# Patient Record
Sex: FEMALE | Race: WHITE | NOT HISPANIC OR LATINO | Employment: OTHER | URBAN - METROPOLITAN AREA
[De-identification: names, ages, dates, MRNs, and addresses within clinical notes are randomized per-mention and may not be internally consistent; named-entity substitution may affect disease eponyms.]

---

## 2018-01-12 ENCOUNTER — APPOINTMENT (EMERGENCY)
Dept: RADIOLOGY | Facility: HOSPITAL | Age: 83
End: 2018-01-12
Payer: MEDICARE

## 2018-01-12 ENCOUNTER — HOSPITAL ENCOUNTER (EMERGENCY)
Facility: HOSPITAL | Age: 83
Discharge: HOME | End: 2018-01-12
Admitting: EMERGENCY MEDICINE
Payer: MEDICARE

## 2018-01-12 VITALS
TEMPERATURE: 98.1 F | BODY MASS INDEX: 44.56 KG/M2 | WEIGHT: 261 LBS | RESPIRATION RATE: 20 BRPM | OXYGEN SATURATION: 95 % | HEART RATE: 65 BPM | DIASTOLIC BLOOD PRESSURE: 60 MMHG | HEIGHT: 64 IN | SYSTOLIC BLOOD PRESSURE: 125 MMHG

## 2018-01-12 DIAGNOSIS — S00.03XA CONTUSION OF SCALP, INITIAL ENCOUNTER: ICD-10-CM
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
## 2018-01-12 PROCEDURE — 70450 CT HEAD/BRAIN W/O DYE: CPT

## 2018-01-12 PROCEDURE — 99284 EMERGENCY DEPT VISIT MOD MDM: CPT

## 2018-01-12 PROCEDURE — 72125 CT NECK SPINE W/O DYE: CPT

## 2018-01-12 NOTE — ED PROVIDER NOTES
History  Chief Complaint   Patient presents with    Fall     pt tripped and fell at home  hit head on dresser  no LOC     Patient is an 81-year-old female, on blood thinners, presenting today after fall that occurred about an hour ago after she tripped falling backwards striking her head on her dresser  Did not lose consciousness  States that she did not get up off the ground as she was told by her orthopedic doctor not to do so as she has bilateral knee replacements  Believes that she would be able to walk  Uses a walker at home  Notes localized pain to her scalp  Denies double vision, weakness, numbness, paresthesias, nausea, vomiting, shortness of breath, wheezing, abdominal pain, LOC  None       Past Medical History:   Diagnosis Date    Cardiac disease     Lyme disease        Past Surgical History:   Procedure Laterality Date    CARDIAC PACEMAKER PLACEMENT      CHOLECYSTECTOMY      HYSTERECTOMY      JOINT REPLACEMENT         No family history on file  I have reviewed and agree with the history as documented  Social History   Substance Use Topics    Smoking status: Never Smoker    Smokeless tobacco: Not on file    Alcohol use No        Review of Systems   Constitutional: Negative  HENT: Negative  Eyes: Negative  Respiratory: Negative  Cardiovascular: Negative  Gastrointestinal: Negative  Genitourinary: Negative  Musculoskeletal: Negative  Skin: Negative  Negative for wound  Neurological: Negative  Negative for tremors, syncope, weakness and headaches  All other systems reviewed and are negative        Physical Exam  ED Triage Vitals [01/12/18 1234]   Temperature Pulse Respirations Blood Pressure SpO2   98 1 °F (36 7 °C) 65 20 125/60 95 %      Temp Source Heart Rate Source Patient Position - Orthostatic VS BP Location FiO2 (%)   Oral Monitor Sitting Right arm --      Pain Score       5           Orthostatic Vital Signs  Vitals:    01/12/18 1234   BP: 125/60   Pulse: 65   Patient Position - Orthostatic VS: Sitting       Physical Exam   Constitutional: She is oriented to person, place, and time  She appears well-developed and well-nourished  HENT:   Head:       Right Ear: External ear normal    Left Ear: External ear normal    Nose: Nose normal    Mouth/Throat: Oropharynx is clear and moist    Eyes: Conjunctivae and EOM are normal  Pupils are equal, round, and reactive to light  Neck: Normal range of motion  Neck supple  Cardiovascular: Normal rate, regular rhythm, normal heart sounds and intact distal pulses  Pulmonary/Chest: Effort normal and breath sounds normal    spo2 is 95% indicating adequate oxygenation  Abdominal: Soft  Bowel sounds are normal    Musculoskeletal:   Patient does not have the joint tenderness, she has no lower back tenderness as well as hip tenderness on rocking  No knee tenderness  Good lower leg strength  Does not have any groin pain  No leg shortening or malrotation   Neurological: She is alert and oriented to person, place, and time  Skin: Skin is warm and dry  Capillary refill takes less than 2 seconds  Nursing note and vitals reviewed  ED Medications  Medications - No data to display    Diagnostic Studies  Results Reviewed     None                 CT cervical spine without contrast   Final Result by Perla Palomo MD (01/12 1400)      No cervical spine fracture or traumatic malalignment  Degenerative changes  Incidental discovery of one or more thyroid nodule(s) measuring more than 1 5 cm and without suspicious features is noted in this patient who is above 28years old; according to guidelines published in the February 2015 white paper on incidental thyroid    nodules in the Journal of the [employer] of Radiology [facility]), further characterization with thyroid ultrasound is recommended                  Workstation performed: [id number]         CT head without contrast   Final Result by Jorge Ulloa Danna Frausto MD (01/12 4120)      No acute intracranial abnormality  Microangiopathic change  Workstation performed: [id number]                    Procedures  Procedures       Phone Contacts  ED Phone Contact    ED Course  ED Course                                MDM  Number of Diagnoses or Management Options  Contusion of scalp, initial encounter:   Fall, initial encounter:   Diagnosis management comments: Discussed with patient results CT scans which were negative for any intracranial abnormality  Patient uses a walker at home and fall seemed to be mechanical in nature  Patient ambulating in emergency department without difficulty  Denies any other joint pain at this time  Patient is informed to return to the emergency department for worsening of symptoms and was given proper education regarding their diagnosis and symptoms  Otherwise the patient is informed to follow up with their primary care doctor for re-evaluation  The patient verbalizes understanding and agrees with above assessment and plan  All questions were answered  Please Note: Fluency Direct voice recognition software may have been used in the creation of this document  Wrong words or sound a like substitutions may have occurred due to the inherent limitations of the voice software  Amount and/or Complexity of Data Reviewed  Tests in the radiology section of CPT®: reviewed and ordered  Review and summarize past medical records: yes  Independent visualization of images, tracings, or specimens: yes      CritCare Time    Disposition  Final diagnoses:   Fall, initial encounter   Contusion of scalp, initial encounter     Time reflects when diagnosis was documented in both MDM as applicable and the Disposition within this note     Time User Action Codes Description Comment    1/12/2018  2:11 PM Renetta Telles [[id number]  [id number]] DAYN, initial encounter     1/12/2018  2:12 PM Renetta Telles [[id number]] Contusion of scalp, initial encounter       ED Disposition     ED Disposition Condition Comment    Discharge  [facility] discharge to home/self care  Condition at discharge: Good        Follow-up Information     Follow up With Specialties Details Why Contact Info Additional [address] Emergency Department Emergency Medicine Go to If symptoms worsen such as falls, confusion, weakness [address] [facility] ED, [place], [place], [address]        There are no discharge medications for this patient  No discharge procedures on file      ED Provider  Electronically Signed by           Namrata Ruggiero PA-C  01/12/18 3882

## 2018-01-12 NOTE — DISCHARGE INSTRUCTIONS

## 2022-01-29 ENCOUNTER — APPOINTMENT (EMERGENCY)
Dept: RADIOLOGY | Facility: HOSPITAL | Age: 87
End: 2022-01-29
Payer: MEDICARE

## 2022-01-29 ENCOUNTER — HOSPITAL ENCOUNTER (EMERGENCY)
Facility: HOSPITAL | Age: 87
Discharge: HOME | End: 2022-01-30
Attending: EMERGENCY MEDICINE
Payer: MEDICARE

## 2022-01-29 DIAGNOSIS — R91.8 LUNG MASS: ICD-10-CM

## 2022-01-29 DIAGNOSIS — J18.9 PNEUMONIA: Primary | ICD-10-CM

## 2022-01-29 DIAGNOSIS — R05.9 COUGH: ICD-10-CM

## 2022-01-29 LAB
ABO GROUP BLD: NORMAL
ALBUMIN SERPL BCP-MCNC: 2.7 G/DL (ref 3.5–5)
ALP SERPL-CCNC: 82 U/L (ref 46–116)
ALT SERPL W P-5'-P-CCNC: 35 U/L (ref 12–78)
ANION GAP SERPL CALCULATED.3IONS-SCNC: 7 MMOL/L (ref 4–13)
AST SERPL W P-5'-P-CCNC: 33 U/L (ref 5–45)
BASOPHILS # BLD AUTO: 0.06 THOUSANDS/ΜL (ref 0–0.1)
BASOPHILS NFR BLD AUTO: 1 % (ref 0–1)
BILIRUB SERPL-MCNC: 0.31 MG/DL (ref 0.2–1)
BLD GP AB SCN SERPL QL: NEGATIVE
BUN SERPL-MCNC: 11 MG/DL (ref 5–25)
CALCIUM ALBUM COR SERPL-MCNC: 11.3 MG/DL (ref 8.3–10.1)
CALCIUM SERPL-MCNC: 10.3 MG/DL (ref 8.3–10.1)
CHLORIDE SERPL-SCNC: 101 MMOL/L (ref 100–108)
CO2 SERPL-SCNC: 30 MMOL/L (ref 21–32)
CREAT SERPL-MCNC: 0.69 MG/DL (ref 0.6–1.3)
EOSINOPHIL # BLD AUTO: 0.32 THOUSAND/ΜL (ref 0–0.61)
EOSINOPHIL NFR BLD AUTO: 3 % (ref 0–6)
ERYTHROCYTE [DISTWIDTH] IN BLOOD BY AUTOMATED COUNT: 13.7 % (ref 11.6–15.1)
GFR SERPL CREATININE-BSD FRML MDRD: 79 ML/MIN/1.73SQ M
GLUCOSE SERPL-MCNC: 106 MG/DL (ref 65–140)
HCT VFR BLD AUTO: 36.6 % (ref 34.8–46.1)
HGB BLD-MCNC: 11.5 G/DL (ref 11.5–15.4)
IMM GRANULOCYTES # BLD AUTO: 0.06 THOUSAND/UL (ref 0–0.2)
IMM GRANULOCYTES NFR BLD AUTO: 1 % (ref 0–2)
LYMPHOCYTES # BLD AUTO: 2.68 THOUSANDS/ΜL (ref 0.6–4.47)
LYMPHOCYTES NFR BLD AUTO: 24 % (ref 14–44)
MCH RBC QN AUTO: 28.9 PG (ref 26.8–34.3)
MCHC RBC AUTO-ENTMCNC: 31.4 G/DL (ref 31.4–37.4)
MCV RBC AUTO: 92 FL (ref 82–98)
MONOCYTES # BLD AUTO: 1.37 THOUSAND/ΜL (ref 0.17–1.22)
MONOCYTES NFR BLD AUTO: 12 % (ref 4–12)
NEUTROPHILS # BLD AUTO: 6.79 THOUSANDS/ΜL (ref 1.85–7.62)
NEUTS SEG NFR BLD AUTO: 59 % (ref 43–75)
NRBC BLD AUTO-RTO: 0 /100 WBCS
PLATELET # BLD AUTO: 364 THOUSANDS/UL (ref 149–390)
PMV BLD AUTO: 9.3 FL (ref 8.9–12.7)
POTASSIUM SERPL-SCNC: 4 MMOL/L (ref 3.5–5.3)
PROT SERPL-MCNC: 6.8 G/DL (ref 6.4–8.2)
RBC # BLD AUTO: 3.98 MILLION/UL (ref 3.81–5.12)
RH BLD: NEGATIVE
SODIUM SERPL-SCNC: 138 MMOL/L (ref 136–145)
SPECIMEN EXPIRATION DATE: NORMAL
WBC # BLD AUTO: 11.28 THOUSAND/UL (ref 4.31–10.16)

## 2022-01-29 PROCEDURE — 85025 COMPLETE CBC W/AUTO DIFF WBC: CPT | Performed by: EMERGENCY MEDICINE

## 2022-01-29 PROCEDURE — 86850 RBC ANTIBODY SCREEN: CPT | Performed by: EMERGENCY MEDICINE

## 2022-01-29 PROCEDURE — 86901 BLOOD TYPING SEROLOGIC RH(D): CPT | Performed by: EMERGENCY MEDICINE

## 2022-01-29 PROCEDURE — 99284 EMERGENCY DEPT VISIT MOD MDM: CPT | Performed by: EMERGENCY MEDICINE

## 2022-01-29 PROCEDURE — 71045 X-RAY EXAM CHEST 1 VIEW: CPT

## 2022-01-29 PROCEDURE — 86900 BLOOD TYPING SEROLOGIC ABO: CPT | Performed by: EMERGENCY MEDICINE

## 2022-01-29 PROCEDURE — 80053 COMPREHEN METABOLIC PANEL: CPT | Performed by: EMERGENCY MEDICINE

## 2022-01-29 PROCEDURE — 36415 COLL VENOUS BLD VENIPUNCTURE: CPT | Performed by: EMERGENCY MEDICINE

## 2022-01-29 PROCEDURE — 99284 EMERGENCY DEPT VISIT MOD MDM: CPT

## 2022-01-29 RX ORDER — FUROSEMIDE 20 MG/1
20 TABLET ORAL DAILY
COMMUNITY

## 2022-01-29 RX ORDER — CARVEDILOL 6.25 MG/1
6.25 TABLET ORAL DAILY
COMMUNITY

## 2022-01-30 ENCOUNTER — APPOINTMENT (EMERGENCY)
Dept: RADIOLOGY | Facility: HOSPITAL | Age: 87
End: 2022-01-30
Payer: MEDICARE

## 2022-01-30 VITALS
SYSTOLIC BLOOD PRESSURE: 144 MMHG | DIASTOLIC BLOOD PRESSURE: 62 MMHG | HEART RATE: 72 BPM | TEMPERATURE: 98.9 F | OXYGEN SATURATION: 93 % | BODY MASS INDEX: 44.29 KG/M2 | RESPIRATION RATE: 20 BRPM | WEIGHT: 258 LBS

## 2022-01-30 LAB
FLUAV RNA RESP QL NAA+PROBE: NEGATIVE
FLUBV RNA RESP QL NAA+PROBE: NEGATIVE
RSV RNA RESP QL NAA+PROBE: NEGATIVE
SARS-COV-2 RNA RESP QL NAA+PROBE: NEGATIVE

## 2022-01-30 PROCEDURE — 0241U HB NFCT DS VIR RESP RNA 4 TRGT: CPT | Performed by: EMERGENCY MEDICINE

## 2022-01-30 PROCEDURE — G1004 CDSM NDSC: HCPCS

## 2022-01-30 PROCEDURE — 71260 CT THORAX DX C+: CPT

## 2022-01-30 RX ORDER — GUAIFENESIN/DEXTROMETHORPHAN 100-10MG/5
10 SYRUP ORAL ONCE
Status: COMPLETED | OUTPATIENT
Start: 2022-01-30 | End: 2022-01-30

## 2022-01-30 RX ORDER — BENZONATATE 100 MG/1
100 CAPSULE ORAL ONCE
Status: COMPLETED | OUTPATIENT
Start: 2022-01-30 | End: 2022-01-30

## 2022-01-30 RX ORDER — AZITHROMYCIN 250 MG/1
500 TABLET, FILM COATED ORAL ONCE
Status: COMPLETED | OUTPATIENT
Start: 2022-01-30 | End: 2022-01-30

## 2022-01-30 RX ORDER — AZITHROMYCIN 250 MG/1
TABLET, FILM COATED ORAL
Qty: 6 TABLET | Refills: 0 | Status: SHIPPED | OUTPATIENT
Start: 2022-01-30 | End: 2022-02-03

## 2022-01-30 RX ORDER — CEFDINIR 300 MG/1
300 CAPSULE ORAL ONCE
Status: COMPLETED | OUTPATIENT
Start: 2022-01-30 | End: 2022-01-30

## 2022-01-30 RX ORDER — CEFDINIR 300 MG/1
300 CAPSULE ORAL EVERY 12 HOURS SCHEDULED
Qty: 14 CAPSULE | Refills: 0 | Status: SHIPPED | OUTPATIENT
Start: 2022-01-30 | End: 2022-02-06

## 2022-01-30 RX ADMIN — BENZONATATE 100 MG: 100 CAPSULE ORAL at 02:49

## 2022-01-30 RX ADMIN — AZITHROMYCIN DIHYDRATE 500 MG: 250 TABLET, FILM COATED ORAL at 02:07

## 2022-01-30 RX ADMIN — GUAIFENESIN AND DEXTROMETHORPHAN 10 ML: 100; 10 SYRUP ORAL at 02:49

## 2022-01-30 RX ADMIN — IOHEXOL 100 ML: 350 INJECTION, SOLUTION INTRAVENOUS at 00:50

## 2022-01-30 RX ADMIN — CEFDINIR 300 MG: 300 CAPSULE ORAL at 02:07

## 2022-01-30 NOTE — ED NOTES
Pt resting comfortably, no distress noted   Pt pending transport to home        Nanette Carcamo, DEBI  01/30/22 4822

## 2022-01-30 NOTE — ED PROVIDER NOTES
History  Chief Complaint   Patient presents with    Cough     Pt reports chronic cough is worse today and reports she started coughing up bright red blood one hour ago  Patient denies CP  Pt takes elequis      HPI  Patient is an 16-year-old female history of CAD, CHF, anticoagulated on Eliquis presenting for evaluation of hemoptysis  Patient states that she has been taking a determine health supplement brought by her visiting nurse for the last few weeks which has somewhat improved her cough symptoms however today had a more prolonged episode of coughing followed by hemoptysis  Patient estimates that she coughed up several tbsp of blood  Patient states that symptoms resolved by the time EMS arrived and states only blood-streaked sputum since that time  Patient denies chest or throat pain, shortness of breath, fevers, chills  Patient states some component of generalized fatigue over the course of the past few weeks  Patient denies prior episodes of hemoptysis  Patient denies smoking history, weight loss, recent travel or sick contacts  Prior to Admission Medications   Prescriptions Last Dose Informant Patient Reported? Taking? Apixaban (ELIQUIS PO) 1/29/2022 at Unknown time  Yes Yes   Sig: Take 5 mg by mouth 2 (two) times a day   bisacodyl (bisacodyl) 5 mg EC tablet 1/28/2022 at Unknown time  Yes Yes   Sig: Take 5 mg by mouth daily as needed for constipation   carvedilol (COREG) 6 25 mg tablet 1/29/2022 at Unknown time  Yes Yes   Sig: Take 6 25 mg by mouth in the morning   furosemide (Lasix) 20 mg tablet 1/29/2022 at Unknown time  Yes Yes   Sig: Take 20 mg by mouth in the morning      Facility-Administered Medications: None       Past Medical History:   Diagnosis Date    Cardiac disease     Lyme disease        Past Surgical History:   Procedure Laterality Date    CARDIAC PACEMAKER PLACEMENT      CHOLECYSTECTOMY      HYSTERECTOMY      JOINT REPLACEMENT         History reviewed   No pertinent family history  I have reviewed and agree with the history as documented  E-Cigarette/Vaping    E-Cigarette Use Never User      E-Cigarette/Vaping Substances    Nicotine No     THC No     CBD No     Flavoring No     Other No     Unknown No      Social History     Tobacco Use    Smoking status: Never Smoker    Smokeless tobacco: Never Used   Vaping Use    Vaping Use: Never used   Substance Use Topics    Alcohol use: No    Drug use: No       Review of Systems   Constitutional: Positive for fatigue  Negative for chills and fever  HENT: Negative for sore throat  Eyes: Negative for visual disturbance  Respiratory: Positive for cough  Negative for chest tightness, shortness of breath, wheezing and stridor  Cardiovascular: Negative for chest pain  Gastrointestinal: Negative for abdominal distention, abdominal pain, constipation, diarrhea, nausea and vomiting  Endocrine: Negative for polydipsia and polyuria  Genitourinary: Negative for dysuria and hematuria  Musculoskeletal: Negative for arthralgias and myalgias  Skin: Negative for color change and rash  Neurological: Negative for dizziness and headaches  Psychiatric/Behavioral: Negative for confusion  All other systems reviewed and are negative  Physical Exam  Physical Exam  Vitals reviewed  Constitutional:       General: She is not in acute distress  Appearance: She is well-developed  She is not diaphoretic  Comments: Well-appearing, nondistressed   HENT:      Head: Normocephalic and atraumatic  Comments: Moist mucous membranes  Normal-appearing oropharynx  Right Ear: External ear normal       Left Ear: External ear normal       Nose: Nose normal       Mouth/Throat:      Pharynx: No oropharyngeal exudate  Eyes:      Pupils: Pupils are equal, round, and reactive to light  Cardiovascular:      Rate and Rhythm: Normal rate and regular rhythm  Heart sounds: Normal heart sounds  No murmur heard    No friction rub  No gallop  Pulmonary:      Effort: Pulmonary effort is normal  No respiratory distress  Breath sounds: Normal breath sounds  No wheezing  Comments: No increased work of breathing  Speaking complete sentences  Satting 95% on room air indicating adequate oxygenation  Chest:      Chest wall: No tenderness  Abdominal:      General: Bowel sounds are normal  There is no distension  Palpations: Abdomen is soft  There is no mass  Tenderness: There is no abdominal tenderness  There is no guarding  Musculoskeletal:         General: No deformity  Normal range of motion  Cervical back: Normal range of motion  Lymphadenopathy:      Cervical: No cervical adenopathy  Skin:     General: Skin is warm and dry  Capillary Refill: Capillary refill takes less than 2 seconds  Neurological:      Mental Status: She is alert and oriented to person, place, and time        Comments: AAO x4         Vital Signs  ED Triage Vitals [01/29/22 2239]   Temperature Pulse Respirations Blood Pressure SpO2   98 9 °F (37 2 °C) 60 20 113/56 95 %      Temp Source Heart Rate Source Patient Position - Orthostatic VS BP Location FiO2 (%)   Oral Monitor -- -- --      Pain Score       No Pain           Vitals:    01/29/22 2239   BP: 113/56   Pulse: 60         Visual Acuity      ED Medications  Medications - No data to display    Diagnostic Studies  Results Reviewed     Procedure Component Value Units Date/Time    Comprehensive metabolic panel [[id number]]  (Abnormal) Collected: 01/29/22 2251    Lab Status: Final result Specimen: Blood from Arm, Right Updated: 01/29/22 2314     Sodium 138 mmol/L      Potassium 4 0 mmol/L      Chloride 101 mmol/L      CO2 30 mmol/L      ANION GAP 7 mmol/L      BUN 11 mg/dL      Creatinine 0 69 mg/dL      Glucose 106 mg/dL      Calcium 10 3 mg/dL      Corrected Calcium 11 3 mg/dL      AST 33 U/L      ALT 35 U/L      Alkaline Phosphatase 82 U/L      Total Protein 6 8 g/dL      Albumin 2 7 g/dL      Total Bilirubin 0 31 mg/dL      eGFR 79 ml/min/1 73sq m     Narrative:      National Kidney Disease Foundation guidelines for Chronic Kidney Disease (CKD):     Stage 1 with normal or high GFR (GFR > 90 mL/min/1 73 square meters)    Stage 2 Mild CKD (GFR = 60-89 mL/min/1 73 square meters)    Stage 3A Moderate CKD (GFR = 45-59 mL/min/1 73 square meters)    Stage 3B Moderate CKD (GFR = 30-44 mL/min/1 73 square meters)    Stage 4 Severe CKD (GFR = 15-29 mL/min/1 73 square meters)    Stage 5 End Stage CKD (GFR <15 mL/min/1 73 square meters)  Note: GFR calculation is accurate only with a steady state creatinine    CBC and differential [[id number]]  (Abnormal) Collected: 01/29/22 2251    Lab Status: Final result Specimen: Blood from Arm, Right Updated: 01/29/22 2257     WBC 11 28 Thousand/uL      RBC 3 98 Million/uL      Hemoglobin 11 5 g/dL      Hematocrit 36 6 %      MCV 92 fL      MCH 28 9 pg      MCHC 31 4 g/dL      RDW 13 7 %      MPV 9 3 fL      Platelets 966 Thousands/uL      nRBC 0 /100 WBCs      Neutrophils Relative 59 %      Immat GRANS % 1 %      Lymphocytes Relative 24 %      Monocytes Relative 12 %      Eosinophils Relative 3 %      Basophils Relative 1 %      Neutrophils Absolute 6 79 Thousands/µL      Immature Grans Absolute 0 06 Thousand/uL      Lymphocytes Absolute 2 68 Thousands/µL      Monocytes Absolute 1 37 Thousand/µL      Eosinophils Absolute 0 32 Thousand/µL      Basophils Absolute 0 06 Thousands/µL                  XR chest 1 view portable    (Results Pending)              Procedures  Procedures         ED Course                                             MDM  Number of Diagnoses or Management Options  Cough  Lung mass  Pneumonia  Diagnosis management comments: 45-year-old female with acute on chronic cough, episode of hemoptysis on Eliquis  Patient with several episodes of minor blood-tinged sputum in emergency department however no bright red blood, no brisk hemoptysis  Patient with nonfocal pulmonary examination  CBC, CMP grossly unremarkable  Patient with right lower lobe consolidation on chest x-ray  Signed out pending CT chest which demonstrated potential mass without evidence of active extrav  Patient ultimately discharged however was provided with radiology report and instructions for short interval follow-up CT, verbal and written return precautions      Disposition  Final diagnoses:   None     ED Disposition     None      Follow-up Information    None         Patient's Medications   Discharge Prescriptions    No medications on file       No discharge procedures on file      PDMP Review     None          ED Provider  Electronically Signed by           Anastasio Baumgarten, MD  01/31/22 0962

## 2022-01-30 NOTE — DISCHARGE INSTRUCTIONS
Round masslike consolidation involving the superior segment of the right lower lobe suspicious for an infectious or inflammatory process, likely bronchopneumonia  However, malignancy cannot be excluded  Therefore, a short-term follow-up CT chest   following treatment in 6-8 weeks is recommended to assess for improvement/resolution and exclude a neoplastic process

## 2022-04-19 ENCOUNTER — HOSPITAL ENCOUNTER (OUTPATIENT)
Dept: RADIOLOGY | Facility: HOSPITAL | Age: 87
Discharge: HOME | End: 2022-04-19
Payer: MEDICARE

## 2022-04-19 DIAGNOSIS — J18.9 PNEUMONIA DUE TO INFECTIOUS ORGANISM, UNSPECIFIED LATERALITY, UNSPECIFIED PART OF LUNG: ICD-10-CM

## 2022-04-19 PROCEDURE — 71250 CT THORAX DX C-: CPT

## 2022-04-19 PROCEDURE — G1004 CDSM NDSC: HCPCS

## 2023-03-08 ENCOUNTER — OFFICE VISIT (OUTPATIENT)
Dept: URGENT CARE | Facility: CLINIC | Age: 88
End: 2023-03-08

## 2023-03-08 VITALS
TEMPERATURE: 98 F | WEIGHT: 233 LBS | SYSTOLIC BLOOD PRESSURE: 144 MMHG | OXYGEN SATURATION: 95 % | DIASTOLIC BLOOD PRESSURE: 63 MMHG | RESPIRATION RATE: 20 BRPM | BODY MASS INDEX: 39.99 KG/M2 | HEART RATE: 68 BPM

## 2023-03-08 DIAGNOSIS — R39.9 UTI SYMPTOMS: Primary | ICD-10-CM

## 2023-03-08 DIAGNOSIS — R19.7 DIARRHEA, UNSPECIFIED TYPE: ICD-10-CM

## 2023-03-08 DIAGNOSIS — N89.8 VAGINAL ITCHING: ICD-10-CM

## 2023-03-08 LAB
SL AMB  POCT GLUCOSE, UA: NEGATIVE
SL AMB LEUKOCYTE ESTERASE,UA: ABNORMAL
SL AMB POCT BILIRUBIN,UA: NEGATIVE
SL AMB POCT BLOOD,UA: ABNORMAL
SL AMB POCT CLARITY,UA: ABNORMAL
SL AMB POCT COLOR,UA: YELLOW
SL AMB POCT KETONES,UA: NEGATIVE
SL AMB POCT NITRITE,UA: NEGATIVE
SL AMB POCT PH,UA: 6.5
SL AMB POCT SPECIFIC GRAVITY,UA: 1
SL AMB POCT URINE PROTEIN: NEGATIVE
SL AMB POCT UROBILINOGEN: 0.2

## 2023-03-08 RX ORDER — MONTELUKAST SODIUM 10 MG/1
TABLET ORAL
COMMUNITY
Start: 2023-03-07

## 2023-03-08 RX ORDER — LOPERAMIDE HYDROCHLORIDE 2 MG/1
2 CAPSULE ORAL 4 TIMES DAILY PRN
Qty: 15 CAPSULE | Refills: 0 | Status: SHIPPED | OUTPATIENT
Start: 2023-03-08 | End: 2023-03-11

## 2023-03-08 RX ORDER — CLOTRIMAZOLE 1 %
CREAM (GRAM) TOPICAL 2 TIMES DAILY
Qty: 30 G | Refills: 0 | Status: SHIPPED | OUTPATIENT
Start: 2023-03-08

## 2023-03-08 RX ORDER — ALBUTEROL SULFATE 90 UG/1
AEROSOL, METERED RESPIRATORY (INHALATION)
COMMUNITY
Start: 2023-02-08

## 2023-03-08 NOTE — PROGRESS NOTES
[facility]        NAME: Rajeev Campbell is a 81 y o  female  : 1935    MRN: [id number]  DATE: 2023  TIME: 2:19 PM    Assessment and Plan   UTI symptoms [R39 9]  1  UTI symptoms  POCT urine dip    Urine culture      2  Vaginal itching  clotrimazole (LOTRIMIN) 1 % cream      3  Diarrhea, unspecified type  loperamide (IMODIUM) 2 mg capsule        Positive urine dip  We will send a urine culture to confirm the presence of infection and hold off on antibiotics for now so as not to worsen her current diarrhea  Loperamide prescribed to reduce her diarrhea  Advised on hydrating with plenty of water and to consider electrolyte solution to replenish lost electrolytes from her diarrhea  Topical antifungal prescribed for presumed vaginal candidiasis  Patient Instructions     Follow up with PCP in 3-5 days  Proceed to  ER if symptoms worsen  Chief Complaint     Chief Complaint   Patient presents with   • Diarrhea     4- 5 days,    • Vaginal Itching   • Vomiting     Coughing then vomits per pt  • Nausea     Per patient    • Shoulder Pain     Right ,after pulling liter box x 1 week         History of Present Illness     81year-old female presents today with 4 days of diarrhea concerned that he may have developed into a UTI  Has been experiencing abdominal discomfort, dizziness and vaginal itching for the same duration  Of note, has also been experiencing hematochezia and suspects that one of her hemorrhoids ruptured  Last colonoscopy was fairly unremarkable where they removed a few benign polyps  Is here with her caretaker who discovered today that she has been experiencing diarrhea  Denies any fevers, chills, chest pain, dyspnea or headaches  Review of Systems   Review of Systems   Constitutional: Negative for chills and fever  Respiratory: Negative for shortness of breath  Gastrointestinal: Positive for diarrhea     Genitourinary: Negative for dysuria, frequency, hematuria and menstrual problem  Psychiatric/Behavioral: The patient is nervous/anxious  Current Medications       Current Outpatient Medications:   •  Apixaban (ELIQUIS PO), Take 5 mg by mouth 2 (two) times a day, Disp: , Rfl:   •  carvedilol (COREG) 6 25 mg tablet, Take 6 25 mg by mouth in the morning, Disp: , Rfl:   •  clotrimazole (LOTRIMIN) 1 % cream, Apply topically 2 (two) times a day, Disp: 30 g, Rfl: 0  •  furosemide (LASIX) 20 mg tablet, Take 20 mg by mouth in the morning, Disp: , Rfl:   •  loperamide (IMODIUM) 2 mg capsule, Take 1 capsule (2 mg total) by mouth 4 (four) times a day as needed for diarrhea for up to 3 days, Disp: 15 capsule, Rfl: 0  •  albuterol (PROVENTIL HFA,VENTOLIN HFA) 90 mcg/act inhaler, , Disp: , Rfl:   •  bisacodyl (bisacodyl) 5 mg EC tablet, Take 5 mg by mouth daily as needed for constipation, Disp: , Rfl:   •  montelukast (SINGULAIR) 10 mg tablet, , Disp: , Rfl:     Current Allergies     Allergies as of 03/08/2023 - Reviewed 03/08/2023   Allergen Reaction Noted   • Ciprofloxacin Hives 09/29/2016   • Doxycycline Hives 02/22/2019   • Peanut oil - food allergy Hives 01/29/2022   • Penicillins Hives 09/29/2016   • Erythromycin base Rash 07/21/2019            The following portions of the patient's history were reviewed and updated as appropriate: allergies, current medications, past family history, past medical history, past social history, past surgical history and problem list      Past Medical History:   Diagnosis Date   • Cardiac disease    • Lyme disease        Past Surgical History:   Procedure Laterality Date   • CARDIAC PACEMAKER PLACEMENT     • CHOLECYSTECTOMY     • HYSTERECTOMY     • JOINT REPLACEMENT         History reviewed  No pertinent family history  Medications have been verified  Objective   /63   Pulse 68   Temp 98 °F (36 7 °C)   Resp 20   Wt 106 kg (233 lb)   SpO2 95%   BMI 39 99 kg/m²   No LMP recorded  Patient has had a hysterectomy  Physical Exam     Physical Exam  Vitals and nursing note reviewed  Constitutional:       General: She is in acute distress  Appearance: Normal appearance  She is not ill-appearing, toxic-appearing or diaphoretic  HENT:      Head: Normocephalic and atraumatic  Eyes:      General:         Right eye: No discharge  Left eye: No discharge  Conjunctiva/sclera: Conjunctivae normal    Cardiovascular:      Rate and Rhythm: Normal rate  Pulmonary:      Effort: Pulmonary effort is normal       Breath sounds: Normal breath sounds  Abdominal:      General: Abdomen is flat  Musculoskeletal:         General: No tenderness  Skin:     General: Skin is warm  Findings: No erythema  Neurological:      General: No focal deficit present  Mental Status: She is alert and oriented to person, place, and time  Psychiatric:         Mood and Affect: Mood normal          Behavior: Behavior normal          Thought Content:  Thought content normal          Judgment: Judgment normal

## 2023-03-09 LAB — BACTERIA UR CULT: NORMAL

## 2023-10-26 ENCOUNTER — HOSPITAL ENCOUNTER (INPATIENT)
Facility: HOSPITAL | Age: 88
LOS: 3 days | Discharge: NURSING, REHAB OR LONG-TERM CARE | DRG: 179 | End: 2023-10-30
Attending: EMERGENCY MEDICINE | Admitting: INTERNAL MEDICINE
Payer: MEDICARE

## 2023-10-26 ENCOUNTER — APPOINTMENT (EMERGENCY)
Dept: RADIOLOGY | Facility: HOSPITAL | Age: 88
DRG: 179 | End: 2023-10-26
Payer: MEDICARE

## 2023-10-26 DIAGNOSIS — R53.1 WEAKNESS: ICD-10-CM

## 2023-10-26 DIAGNOSIS — U07.1 COVID: Primary | ICD-10-CM

## 2023-10-26 PROBLEM — D75.839 THROMBOCYTOSIS: Status: ACTIVE | Noted: 2023-10-26

## 2023-10-26 PROBLEM — F99 PSYCHIATRIC DISORDER: Status: ACTIVE | Noted: 2023-10-26

## 2023-10-26 PROBLEM — D72.829 LEUKOCYTOSIS: Status: ACTIVE | Noted: 2023-10-26

## 2023-10-26 PROBLEM — K21.9 GERD (GASTROESOPHAGEAL REFLUX DISEASE): Status: ACTIVE | Noted: 2023-10-26

## 2023-10-26 PROBLEM — Z87.09 HISTORY OF ASTHMA: Status: ACTIVE | Noted: 2023-10-26

## 2023-10-26 PROBLEM — I48.91 ATRIAL FIBRILLATION (HCC): Status: ACTIVE | Noted: 2023-10-26

## 2023-10-26 LAB
2HR DELTA HS TROPONIN: 6 NG/L
ALBUMIN SERPL BCP-MCNC: 3.2 G/DL (ref 3.5–5)
ALP SERPL-CCNC: 63 U/L (ref 34–104)
ALT SERPL W P-5'-P-CCNC: 7 U/L (ref 7–52)
ANION GAP SERPL CALCULATED.3IONS-SCNC: 7 MMOL/L
APTT PPP: 39 SECONDS (ref 23–37)
AST SERPL W P-5'-P-CCNC: 10 U/L (ref 13–39)
ATRIAL RATE: 60 BPM
BASOPHILS # BLD AUTO: 0.05 THOUSANDS/ÂΜL (ref 0–0.1)
BASOPHILS NFR BLD AUTO: 0 % (ref 0–1)
BILIRUB SERPL-MCNC: 0.45 MG/DL (ref 0.2–1)
BNP SERPL-MCNC: 89 PG/ML (ref 0–100)
BUN SERPL-MCNC: 10 MG/DL (ref 5–25)
CALCIUM ALBUM COR SERPL-MCNC: 10.8 MG/DL (ref 8.3–10.1)
CALCIUM SERPL-MCNC: 10.2 MG/DL (ref 8.4–10.2)
CARDIAC TROPONIN I PNL SERPL HS: 21 NG/L
CARDIAC TROPONIN I PNL SERPL HS: 27 NG/L
CHLORIDE SERPL-SCNC: 102 MMOL/L (ref 96–108)
CO2 SERPL-SCNC: 27 MMOL/L (ref 21–32)
CREAT SERPL-MCNC: 0.62 MG/DL (ref 0.6–1.3)
EOSINOPHIL # BLD AUTO: 0.08 THOUSAND/ÂΜL (ref 0–0.61)
EOSINOPHIL NFR BLD AUTO: 1 % (ref 0–6)
ERYTHROCYTE [DISTWIDTH] IN BLOOD BY AUTOMATED COUNT: 14.4 % (ref 11.6–15.1)
FLUAV RNA RESP QL NAA+PROBE: NEGATIVE
FLUBV RNA RESP QL NAA+PROBE: NEGATIVE
GFR SERPL CREATININE-BSD FRML MDRD: 81 ML/MIN/1.73SQ M
GLUCOSE SERPL-MCNC: 110 MG/DL (ref 65–140)
HCT VFR BLD AUTO: 34.9 % (ref 34.8–46.1)
HGB BLD-MCNC: 11 G/DL (ref 11.5–15.4)
IMM GRANULOCYTES # BLD AUTO: 0.06 THOUSAND/UL (ref 0–0.2)
IMM GRANULOCYTES NFR BLD AUTO: 0 % (ref 0–2)
INR PPP: 1.62 (ref 0.84–1.19)
LIPASE SERPL-CCNC: 16 U/L (ref 11–82)
LYMPHOCYTES # BLD AUTO: 1.25 THOUSANDS/ÂΜL (ref 0.6–4.47)
LYMPHOCYTES NFR BLD AUTO: 9 % (ref 14–44)
MCH RBC QN AUTO: 27.6 PG (ref 26.8–34.3)
MCHC RBC AUTO-ENTMCNC: 31.5 G/DL (ref 31.4–37.4)
MCV RBC AUTO: 88 FL (ref 82–98)
MONOCYTES # BLD AUTO: 1.05 THOUSAND/ÂΜL (ref 0.17–1.22)
MONOCYTES NFR BLD AUTO: 8 % (ref 4–12)
NEUTROPHILS # BLD AUTO: 11.6 THOUSANDS/ÂΜL (ref 1.85–7.62)
NEUTS SEG NFR BLD AUTO: 82 % (ref 43–75)
NRBC BLD AUTO-RTO: 0 /100 WBCS
PLATELET # BLD AUTO: 453 THOUSANDS/UL (ref 149–390)
PMV BLD AUTO: 9.5 FL (ref 8.9–12.7)
POTASSIUM SERPL-SCNC: 3.6 MMOL/L (ref 3.5–5.3)
PR INTERVAL: 322 MS
PROCALCITONIN SERPL-MCNC: 0.05 NG/ML
PROT SERPL-MCNC: 6.4 G/DL (ref 6.4–8.4)
PROTHROMBIN TIME: 19.5 SECONDS (ref 11.6–14.5)
QRS AXIS: -13 DEGREES
QRSD INTERVAL: 166 MS
QT INTERVAL: 438 MS
QTC INTERVAL: 438 MS
RBC # BLD AUTO: 3.98 MILLION/UL (ref 3.81–5.12)
RSV RNA RESP QL NAA+PROBE: NEGATIVE
SARS-COV-2 RNA RESP QL NAA+PROBE: POSITIVE
SODIUM SERPL-SCNC: 136 MMOL/L (ref 135–147)
T WAVE AXIS: 228 DEGREES
VENTRICULAR RATE: 60 BPM
WBC # BLD AUTO: 14.09 THOUSAND/UL (ref 4.31–10.16)

## 2023-10-26 PROCEDURE — 71045 X-RAY EXAM CHEST 1 VIEW: CPT

## 2023-10-26 PROCEDURE — 85610 PROTHROMBIN TIME: CPT | Performed by: EMERGENCY MEDICINE

## 2023-10-26 PROCEDURE — 99223 1ST HOSP IP/OBS HIGH 75: CPT | Performed by: INTERNAL MEDICINE

## 2023-10-26 PROCEDURE — G1004 CDSM NDSC: HCPCS

## 2023-10-26 PROCEDURE — 99284 EMERGENCY DEPT VISIT MOD MDM: CPT

## 2023-10-26 PROCEDURE — 83880 ASSAY OF NATRIURETIC PEPTIDE: CPT | Performed by: EMERGENCY MEDICINE

## 2023-10-26 PROCEDURE — 93005 ELECTROCARDIOGRAM TRACING: CPT

## 2023-10-26 PROCEDURE — 83690 ASSAY OF LIPASE: CPT | Performed by: EMERGENCY MEDICINE

## 2023-10-26 PROCEDURE — 99285 EMERGENCY DEPT VISIT HI MDM: CPT | Performed by: EMERGENCY MEDICINE

## 2023-10-26 PROCEDURE — 80053 COMPREHEN METABOLIC PANEL: CPT | Performed by: EMERGENCY MEDICINE

## 2023-10-26 PROCEDURE — 97163 PT EVAL HIGH COMPLEX 45 MIN: CPT

## 2023-10-26 PROCEDURE — 0241U HB NFCT DS VIR RESP RNA 4 TRGT: CPT | Performed by: EMERGENCY MEDICINE

## 2023-10-26 PROCEDURE — 93010 ELECTROCARDIOGRAM REPORT: CPT | Performed by: INTERNAL MEDICINE

## 2023-10-26 PROCEDURE — 36415 COLL VENOUS BLD VENIPUNCTURE: CPT | Performed by: EMERGENCY MEDICINE

## 2023-10-26 PROCEDURE — 85730 THROMBOPLASTIN TIME PARTIAL: CPT | Performed by: EMERGENCY MEDICINE

## 2023-10-26 PROCEDURE — 84484 ASSAY OF TROPONIN QUANT: CPT | Performed by: EMERGENCY MEDICINE

## 2023-10-26 PROCEDURE — 70450 CT HEAD/BRAIN W/O DYE: CPT

## 2023-10-26 PROCEDURE — 85025 COMPLETE CBC W/AUTO DIFF WBC: CPT | Performed by: EMERGENCY MEDICINE

## 2023-10-26 PROCEDURE — 97167 OT EVAL HIGH COMPLEX 60 MIN: CPT

## 2023-10-26 PROCEDURE — 96360 HYDRATION IV INFUSION INIT: CPT

## 2023-10-26 PROCEDURE — 84145 PROCALCITONIN (PCT): CPT | Performed by: INTERNAL MEDICINE

## 2023-10-26 RX ORDER — MONTELUKAST SODIUM 10 MG/1
10 TABLET ORAL
Status: DISCONTINUED | OUTPATIENT
Start: 2023-10-26 | End: 2023-10-30 | Stop reason: HOSPADM

## 2023-10-26 RX ORDER — SERTRALINE HYDROCHLORIDE 25 MG/1
25 TABLET, FILM COATED ORAL DAILY
Status: DISCONTINUED | OUTPATIENT
Start: 2023-10-27 | End: 2023-10-30 | Stop reason: HOSPADM

## 2023-10-26 RX ORDER — RISPERIDONE 0.25 MG/1
0.25 TABLET ORAL DAILY
Status: DISCONTINUED | OUTPATIENT
Start: 2023-10-27 | End: 2023-10-30 | Stop reason: HOSPADM

## 2023-10-26 RX ORDER — GUAIFENESIN/DEXTROMETHORPHAN 100-10MG/5
10 SYRUP ORAL EVERY 4 HOURS PRN
Status: DISCONTINUED | OUTPATIENT
Start: 2023-10-26 | End: 2023-10-30 | Stop reason: HOSPADM

## 2023-10-26 RX ORDER — CARVEDILOL 6.25 MG/1
6.25 TABLET ORAL DAILY
Status: DISCONTINUED | OUTPATIENT
Start: 2023-10-26 | End: 2023-10-30 | Stop reason: HOSPADM

## 2023-10-26 RX ORDER — ALBUTEROL SULFATE 90 UG/1
2 AEROSOL, METERED RESPIRATORY (INHALATION) EVERY 4 HOURS PRN
Status: DISCONTINUED | OUTPATIENT
Start: 2023-10-26 | End: 2023-10-30 | Stop reason: HOSPADM

## 2023-10-26 RX ORDER — FAMOTIDINE 20 MG/1
40 TABLET, FILM COATED ORAL DAILY
Status: DISCONTINUED | OUTPATIENT
Start: 2023-10-27 | End: 2023-10-30 | Stop reason: HOSPADM

## 2023-10-26 RX ORDER — ACETAMINOPHEN 325 MG/1
650 TABLET ORAL EVERY 6 HOURS PRN
COMMUNITY

## 2023-10-26 RX ORDER — DOCUSATE SODIUM 100 MG/1
100 CAPSULE, LIQUID FILLED ORAL 2 TIMES DAILY
COMMUNITY

## 2023-10-26 RX ORDER — DEXAMETHASONE SODIUM PHOSPHATE 4 MG/ML
6 INJECTION, SOLUTION INTRA-ARTICULAR; INTRALESIONAL; INTRAMUSCULAR; INTRAVENOUS; SOFT TISSUE EVERY 24 HOURS
Status: DISCONTINUED | OUTPATIENT
Start: 2023-10-26 | End: 2023-10-30 | Stop reason: HOSPADM

## 2023-10-26 RX ORDER — DOCUSATE SODIUM 100 MG/1
100 CAPSULE, LIQUID FILLED ORAL 2 TIMES DAILY
Status: DISCONTINUED | OUTPATIENT
Start: 2023-10-26 | End: 2023-10-30 | Stop reason: HOSPADM

## 2023-10-26 RX ORDER — FAMOTIDINE 40 MG/1
40 TABLET, FILM COATED ORAL DAILY
COMMUNITY

## 2023-10-26 RX ORDER — ONDANSETRON 2 MG/ML
4 INJECTION INTRAMUSCULAR; INTRAVENOUS EVERY 4 HOURS PRN
Status: DISCONTINUED | OUTPATIENT
Start: 2023-10-26 | End: 2023-10-30 | Stop reason: HOSPADM

## 2023-10-26 RX ORDER — BISACODYL 5 MG/1
5 TABLET, DELAYED RELEASE ORAL DAILY PRN
Status: DISCONTINUED | OUTPATIENT
Start: 2023-10-26 | End: 2023-10-30 | Stop reason: HOSPADM

## 2023-10-26 RX ORDER — SERTRALINE HYDROCHLORIDE 25 MG/1
25 TABLET, FILM COATED ORAL DAILY
COMMUNITY

## 2023-10-26 RX ORDER — RISPERIDONE 0.25 MG/1
0.25 TABLET ORAL DAILY
COMMUNITY

## 2023-10-26 RX ORDER — FUROSEMIDE 20 MG/1
20 TABLET ORAL
Status: DISCONTINUED | OUTPATIENT
Start: 2023-10-27 | End: 2023-10-30 | Stop reason: HOSPADM

## 2023-10-26 RX ORDER — ACETAMINOPHEN 325 MG/1
650 TABLET ORAL EVERY 6 HOURS PRN
Status: DISCONTINUED | OUTPATIENT
Start: 2023-10-26 | End: 2023-10-30 | Stop reason: HOSPADM

## 2023-10-26 RX ADMIN — DEXAMETHASONE SODIUM PHOSPHATE 6 MG: 4 INJECTION INTRA-ARTICULAR; INTRALESIONAL; INTRAMUSCULAR; INTRAVENOUS; SOFT TISSUE at 15:58

## 2023-10-26 RX ADMIN — SODIUM CHLORIDE 500 ML: 0.9 INJECTION, SOLUTION INTRAVENOUS at 12:26

## 2023-10-26 RX ADMIN — DOCUSATE SODIUM 100 MG: 100 CAPSULE, LIQUID FILLED ORAL at 18:19

## 2023-10-26 RX ADMIN — APIXABAN 5 MG: 5 TABLET, FILM COATED ORAL at 22:48

## 2023-10-26 RX ADMIN — CARVEDILOL 6.25 MG: 6.25 TABLET, FILM COATED ORAL at 18:19

## 2023-10-26 RX ADMIN — MONTELUKAST 10 MG: 10 TABLET, FILM COATED ORAL at 22:48

## 2023-10-26 NOTE — PLAN OF CARE
Problem: PHYSICAL THERAPY ADULT  Goal: Performs mobility at highest level of function for planned discharge setting. See evaluation for individualized goals. Description: Treatment/Interventions: Therapeutic exercise, LE strengthening/ROM, Functional transfer training, ADL retraining, Endurance training, Bed mobility, Gait training, Equipment eval/education, Patient/family training, Spoke to MD          See flowsheet documentation for full assessment, interventions and recommendations. Note: Prognosis: Good  Problem List: Decreased strength, Decreased range of motion, Decreased endurance, Impaired balance, Decreased mobility, Pain, Obesity  Assessment: Patient is an 81y.o. year old female seen for Physical Therapy evaluation. Patient admitted with COVID+, weakness. Comorbidities affecting patient's physical performance include: gout, falls. Personal factors affecting patient at time of initial evaluation include: ambulating with assistive device, inability to ambulate household distances, limited home support, positive fall history, inability to perform ADLS, and inability to live alone. Prior to admission, patient was independent with functional mobility with walker or WC and requiring assist for ADLS. Please find objective findings from Physical Therapy assessment regarding body systems outlined above with impairments and limitations including weakness, decreased ROM, impaired balance, decreased endurance, gait deviations, pain, decreased activity tolerance, decreased functional mobility tolerance, and fall risk. The Barthel Index was used as a functional outcome tool presenting with a score of Barthel Index Score: 55 today indicating marked limitations of functional mobility and ADLS.   Patient's clinical presentation is currently unstable/unpredictable as seen in patient's presentation of changing level of pain, increased fall risk, new onset of impairment of functional mobility, decreased endurance, and new onset of weakness. Pt would benefit from continued Physical Therapy treatment to address deficits as defined above and maximize level of functional mobility. As demonstrated by objective findings, the assigned level of complexity for this evaluation is high. The patient's -[facility] Basic Mobility Inpatient Short Form Raw Score is 14. A Raw score of less than or equal to 16 suggests the patient may benefit from discharge to post-acute rehabilitation services. PT Discharge Recommendation: Post acute rehabilitation services    See flowsheet documentation for full assessment.

## 2023-10-26 NOTE — H&P
[address] [facility]  H&P  Name: Chaparrita Rodriguez 81 y.o. female I MRN: [id number]  Unit/Bed#: [address] [place] [address] Date of Admission: 10/26/2023   Date of Service: 10/26/2023 I Hospital Day: 0      Assessment & Plan:    * Generalized weakness  Assessment & Plan  Likely multifactorial secondary to COVID infection coupled with progressive deconditioning and chronic history of gout  PT/OT input pending  Reported some occasional dizziness at various periods throughout the day of admission but currently denies -> CT of head negative for acute intracranial etiology    COVID-19 virus infection  Assessment & Plan  Oxygenating at baseline room air at rest, however, with attempts at ambulation in the ED, patient became hypoxic with transient resolution once back at rest  Decadron regimen initiated due to transient need for oxygen with exertion  Maintain airborne/contact cautions  Monitor oxygenation  Denies fever/chills or worsening shortness of breath at rest  PRN Robitussin on board  Check procalcitonin -> observe off antibiotics for now    Leukocytosis  Assessment & Plan  Likely reactive due to acute medical issue(s)  Monitor WBC count - anticipate fluctuation with corticosteroids    Thrombocytosis  Assessment & Plan  Likely reactive due to acute medical issue(s)  Monitor platelet count    Atrial fibrillation (HCC)  Assessment & Plan  Rate controlled on Coreg  Chronically on Eliquis for anticoagulation  Status post pacemaker    GERD (gastroesophageal reflux disease)  Assessment & Plan  Continue Pepcid    Psychiatric disorder  Assessment & Plan  Continue Zoloft/Risperdal    History of asthma  Assessment & Plan  Stable/asymptomatic  Continue Singulair and PRN Albuterol      DVT Prophylaxis:  Eliquis    Code Status:  Full code    Discussion with:  Patient at bedside    Anticipated Length of Stay:  Patient will be admitted on an Observation basis with an anticipated length of stay of less than 2 midnights. Justification for Hospital Stay: Generalized weakness with transient hypoxia with exertion in the setting of COVID infection requiring respiratory and hemodynamic monitoring, and PT/OT evaluation. Total Time for Visit, including Counseling / Coordination of Care: 75 minutes. More than 50% of total time spent on counseling and coordination of care, on one or more of the following: performing physical exam; counseling and coordination of care; obtaining or reviewing history; documenting in the medical record; reviewing/ordering tests, medications or procedures; communicating with other healthcare professionals and discussing with patient's family/caregivers. Chief Complaint: Weakness      History of Present Illness:    Alejo Smith is a 81 y.o. female who presents with complaints of generalized weakness and occasional dizziness since earlier this morning after having breakfast.  She also notes a mild intermittent cough. Denies fever/chills. In the ED, she was incidentally found to be COVID-positive. She was oxygenating at rest on room air at baseline in the ED, however, when attempting to ambulate, it was noted that her oxygen saturations dipped into the 80s, with transient normalization, once back at rest.  At the time of my encounter, she is in relatively pleasant spirits and is surprised she was COVID-positive at this time. She denies any new complaints and is willing to work with physical and occupational therapy. Review of Systems:    Review of Systems - A thorough 12 point review systems was conducted. Pertinent positives and negatives are mentioned in the history of present illness.       Past Medical and Surgical History:     Past Medical History:   Diagnosis Date    Cardiac disease     Lyme disease        Past Surgical History:   Procedure Laterality Date    CARDIAC PACEMAKER PLACEMENT      CHOLECYSTECTOMY      HYSTERECTOMY      JOINT REPLACEMENT           Medications & Allergies:    Prior to Admission medications    Medication Sig Start Date End Date Taking? Authorizing Provider   acetaminophen (TYLENOL) 325 mg tablet Take 650 mg by mouth every 6 (six) hours as needed for mild pain   Yes Historical Provider, MD   albuterol (PROVENTIL HFA,VENTOLIN HFA) 90 mcg/act inhaler  2/8/23  Yes Historical Provider, MD   Apixaban (ELIQUIS PO) Take 5 mg by mouth 2 (two) times a day   Yes Historical Provider, MD   carvedilol (COREG) 6.25 mg tablet Take 6.25 mg by mouth in the morning   Yes Historical Provider, MD   clotrimazole (LOTRIMIN) 1 % cream Apply topically 2 (two) times a day 3/8/23  Yes Rachelle Guerrero MD   docusate sodium (COLACE) 100 mg capsule Take 100 mg by mouth 2 (two) times a day   Yes Historical Provider, MD   famotidine (PEPCID) 40 MG tablet Take 40 mg by mouth daily   Yes Historical Provider, MD   furosemide (LASIX) 20 mg tablet Take 20 mg by mouth in the morning   Yes Historical Provider, MD   montelukast (SINGULAIR) 10 mg tablet  3/7/23  Yes Historical Provider, MD   risperiDONE (RisperDAL) 0.25 mg tablet Take 0.25 mg by mouth daily   Yes Historical Provider, MD   sertraline (ZOLOFT) 25 mg tablet Take 25 mg by mouth daily   Yes Historical Provider, MD   bisacodyl (bisacodyl) 5 mg EC tablet Take 5 mg by mouth daily as needed for constipation    Historical Provider, MD         Allergies:    Allergies   Allergen Reactions    Chocolate - Food Allergy Other (See Comments)     constipation    Ciprofloxacin Hives    Doxycycline Hives    Peanut Oil - Food Allergy Hives    Penicillins Hives    Soybean Oil - Food Allergy Nausea Only    Erythromycin Base Rash         Social History:    Substance Use History:   Social History     Substance and Sexual Activity   Alcohol Use Never     Social History     Tobacco Use   Smoking Status Never   Smokeless Tobacco Never     Social History     Substance and Sexual Activity   Drug Use No         Family History:    Cannot recall pertinent family history      Physical Exam:     Vitals:   Blood Pressure: 117/77 (10/26/23 1645)  Pulse: 61 (10/26/23 1645)  Temperature: 98 °F (36.7 °C) (10/26/23 1645)  Temp Source: Oral (10/26/23 1645)  Respirations: 18 (10/26/23 1645)  SpO2: 93 % (10/26/23 1645)      GENERAL Weak/fatigued   HEAD   Normocephalic - atraumatic   EYES   PERRL - EOMI    MOUTH   Mucosa moist   NECK   Supple - full range of motion   CARDIAC Rate controlled - S1/S2 positive   PULMONARY Fairly clear to auscultation - nonlabored respirations at rest   ABDOMEN   Soft - nontender/nondistended - active bowel sounds   MUSCULOSKELETAL   Motor strength/range of motion deconditioned   NEUROLOGIC   Alert/oriented at baseline   SKIN   Chronic wrinkles/blemishes    PSYCHIATRIC   Mood/affect pleasant         Additional Data:     Labs & Recent Cultures:    Results from last 7 days   Lab Units 10/26/23  1220   WBC Thousand/uL 14.09*   HEMOGLOBIN g/dL 11.0*   HEMATOCRIT % 34.9   PLATELETS Thousands/uL 453*   NEUTROS PCT % 82*   LYMPHS PCT % 9*   MONOS PCT % 8   EOS PCT % 1     Results from last 7 days   Lab Units 10/26/23  1220   SODIUM mmol/L 136   POTASSIUM mmol/L 3.6   CHLORIDE mmol/L 102   CO2 mmol/L 27   BUN mg/dL 10   CREATININE mg/dL 0.62   ANION GAP mmol/L 7   CALCIUM mg/dL 10.2   ALBUMIN g/dL 3.2*   TOTAL BILIRUBIN mg/dL 0.45   ALK PHOS U/L 63   ALT U/L 7   AST U/L 10*   GLUCOSE RANDOM mg/dL 110     Results from last 7 days   Lab Units 10/26/23  1220   INR  1.62*                             Lines/Drains:  Invasive Devices       Peripheral Intravenous Line  Duration             Peripheral IV 10/26/23 Left Antecubital <1 day                      Imaging:     CT head without contrast    Result Date: 10/26/2023  Narrative: CT BRAIN - WITHOUT CONTRAST INDICATION:   Dizziness, non-specific weakness and dizziness. COMPARISON: 1/12/2018 TECHNIQUE:  CT examination of the brain was performed. Multiplanar 2D reformatted images were created from the source data. Radiation dose length product (DLP) for this visit:  952.43 mGy-cm . This examination, like all CT scans performed in the [facility], was performed utilizing techniques to minimize radiation dose exposure, including the use of iterative  reconstruction and automated exposure control. IMAGE QUALITY:  Diagnostic. FINDINGS: PARENCHYMA:  No intracranial mass, mass effect or midline shift. No CT signs of acute infarction. No acute parenchymal hemorrhage. Age-related involutional and mild chronic microvascular ischemic change. VENTRICLES AND EXTRA-AXIAL SPACES:  Normal for the patient's age. VISUALIZED ORBITS: Normal visualized orbits. PARANASAL SINUSES: Normal visualized paranasal sinuses. CALVARIUM AND EXTRACRANIAL SOFT TISSUES:  Normal.     Impression: No mass effect, acute intracranial hemorrhage or evidence of recent infarction. Workstation performed: [id number]                   ** Please Note: This note is constructed using a voice recognition dictation system. An occasional wrong word/phrase or “sound-a-like” substitution may have been picked up by dictation device due to the inherent limitations of voice recognition software. Read the chart carefully and recognize, using reasonable context, where substitutions may have occurred. **

## 2023-10-26 NOTE — OCCUPATIONAL THERAPY NOTE
OT EVALUATION       10/26/23 1538   OT Last Visit   OT Visit Date 10/26/23   Note Type   Note type Evaluation   Pain Assessment   Pain Assessment Tool Valiente-Baker FACES   Valiente-Baker FACES Pain Rating 6   Pain Location/Orientation Location: Back   Restrictions/Precautions   Other Precautions Chair Alarm; Bed Alarm; Fall Risk;Pain;Contact/isolation; Airborne/isolation   [address] [facility] One level;Ramped entrance   Bathroom Shower/Tub Walk-in shower   [facility] chair   Home Equipment Walker; Wheelchair-manual   Prior Function   Level of Person Independent with functional mobility; Needs assistance with ADLs; Needs assistance with IADLS   Lives With Alone   Receives Help From Home health  (6 days a week for meals and bathing and cleaning)   IADLs Family/Friend/Other provides transportation; Family/Friend/Other provides meals   Falls in the last 6 months 1 to 4   Comments Patient just discharged from [facility] last week. Patient admitted with dizziness and SOB. Patient found to be COVID +.    ADL   Eating Assistance 5  Supervision/Setup   Grooming Assistance 5  Supervision/Setup   UB Bathing Assistance 4  Minimal Assistance   LB Bathing Assistance 3  Moderate Assistance   UB Dressing Assistance 4  Minimal Assistance   LB Dressing Assistance 3  Moderate Assistance   Toileting Assistance  3  Moderate Assistance   Bed Mobility   Supine to Sit 4  Minimal assistance   Sit to Supine 4  Minimal assistance   Additional items LE management   Transfers   Sit to Stand 4  Minimal assistance   Stand to Sit 4  Minimal assistance   Functional Mobility   Functional Mobility 4  Minimal assistance   Additional Comments short household distance with RW, spO2 89% with activity   Balance   Static Sitting Fair +   Dynamic Sitting Fair   Static Standing Fair   Dynamic Standing Fair -   Activity Tolerance   Activity Tolerance Patient limited by fatigue;Patient limited by pain   Medical Staff Made Aware Dr Ruslan Andrews   (AAROM shoulder flexion to 90 degrees, AROM 10 degrees, AROM elbow/hand WFL)   LUE Assessment   LUE Assessment WFL   Cognition   Overall Cognitive Status WFL   Arousal/Participation Cooperative   Attention Within functional limits   Orientation Level Oriented X4   Following Commands Follows multistep commands with increased time or repetition   Assessment   Limitation Decreased ADL status; Decreased UE strength;Decreased Safe judgement during ADL;Decreased UE ROM; Decreased endurance;Decreased high-level ADLs; Decreased self-care trans  (decreased balance and mobility)   Prognosis Good   Assessment Patient evaluated by Occupational Therapy. Patient admitted with dizziness and SOB. The patients occupational profile, medical and therapy history includes a extensive additional review of physical, cognitive, or psychosocial history related to current functional performance. Comorbidities affecting functional mobility and ADLS include: cardiac disease and pacemaker. Prior to admission, patient was independent with functional mobility with walker, requiring assist for ADLS, and requiring assist for IADLS. The evaluation identifies the following performance deficits: weakness, decreased ROM, impaired balance, decreased endurance, increased fall risk, new onset of impairment of functional mobility, decreased ADLS, decreased IADLS, pain, decreased activity tolerance, decreased safety awareness, SOB upon exertion, and decreased strength, that result in activity limitations and/or participation restrictions. This evaluation requires clinical decision making of high complexity, because the patient presents with comorbidites that affect occupational performance and required significant modification of tasks or assistance with consideration of multiple treatment options.   The Barthel Index was used as a functional outcome tool presenting with a score of 50, indicating marked limitations of functional mobility and ADLS. The patient's raw score on the -PAC Daily Activity Inpatient Short Form is 16. A raw score of less than 19 suggests the patient may benefit from discharge to post-acute rehabilitation services. Please refer to the recommendation of the Occupational Therapist for safe discharge planning. Patient will benefit from skilled Occupational Therapy services to address above deficits and facilitate a safe return to prior level of function. Goals   Patient Goals to get stronger to be able to care for myself   STG Time Frame   (1-7 days)   Short Term Goal  Goals established to promote Patient Goals: to get stronger to be able to care for myself:  Grooming: supervision standing at sink; Bathing: min assist; Upper Body Dressing supervision; Lower Body Dressing: min assist; Toileting: min assist; Patient will increase ambulatory standard toilet transfer to supervision with rolling walker to increase performance and safety with ADLS and functional mobility; Patient will increase standing tolerance to 5 minutes during ADL task to decrease assistance level and decrease fall risk; Patient will increase bed mobility to supervision in preparation for ADLS and transfers;  Patient will increase functional mobility to and from bathroom with rolling walker with supervision to increase performance with ADLS and to use a toilet; Patient will tolerate 5 minutes of UE ROM/strengthening to increase general activity tolerance and performance in ADLS/IADLS; Patient will improve functional activity tolerance to 10 minutes of sustained functional tasks to increase participation in basic self-care and decrease assistance level;  Patient will be able to to verbalize understanding and perform energy conservation/proper body mechanics during ADLS and functional mobility at least 75% of the time with minimal cueing to decrease signs of fatigue and increase stamina to return to prior level of function; Patient will increase dynamic sitting balance to fair+ to improve the ability to sit at edge of bed or on a chair for ADLS;  Patient will increase dynamic standing balance to fair to improve postural stability and decrease fall risk during standing ADLS and transfers. LTG Time Frame   (8-14 days)   Long Term Goal Grooming: independent standing at sink; Bathing: supervision; Upper Body Dressing independent; Lower Body Dressing: supervision; Toileting: supervision; Patient will increase ambulatory standard toilet transfer to independent with rolling walker to increase performance and safety with ADLS and functional mobility; Patient will increase standing tolerance to 10 minutes during ADL task to decrease assistance level and decrease fall risk; Patient will increase bed mobility to independent in preparation for ADLS and transfers; Patient will increase functional mobility to and from bathroom with rolling walker independently to increase performance with ADLS and to use a toilet; Patient will tolerate 10 minutes of UE ROM/strengthening to increase general activity tolerance and performance in ADLS/IADLS; Patient will improve functional activity tolerance to 20 minutes of sustained functional tasks to increase participation in basic self-care and decrease assistance level;  Patient will be able to to verbalize understanding and perform energy conservation/proper body mechanics during ADLS and functional mobility at least 90% of the time to decrease signs of fatigue and increase stamina to return to prior level of function; Patient will increase dynamic sitting balance to good to improve the ability to sit at edge of bed or on a chair for ADLS;  Patient will increase dynamic standing balance to fair+ to improve postural stability and decrease fall risk during standing ADLS and transfers.   Pt will score >/= 20/24 on AM-PAC Daily Activity Inpatient scale to promote safe independence with ADLs and functional mobility; Pt will score >/= 80/100 on Barthel Index in order to decrease caregiver assistance needed and increase ability to perform ADLs and functional mobility. Plan   Treatment Interventions ADL retraining;Functional transfer training;UE strengthening/ROM; Endurance training;Equipment evaluation/education;Patient/family training; Activityengagement; Energy conservation; Compensatory technique education   Goal Expiration Date 11/09/23   OT Frequency 3-5x/wk   Discharge Recommendation   OT Discharge Recommendation Post acute rehabilitation services   AM-PAC Daily Activity Inpatient   Lower Body Dressing 2   Bathing 2   Toileting 2   Upper Body Dressing 3   Grooming 3   Eating 4   Daily Activity Raw Score 16   Daily Activity Standardized Score (Calc for Raw Score >=11) 35.96   AM-PAC Applied Cognition Inpatient   Following a Speech/Presentation 4   Understanding Ordinary Conversation 4   Taking Medications 4   Remembering Where Things Are Placed or Put Away 4   Remembering List of 4-5 Errands 4   Taking Care of Complicated Tasks 4   Applied Cognition Raw Score 24   Applied Cognition Standardized Score 62.21   Barthel Index   Feeding 10   Bathing 0   Grooming Score 0   Dressing Score 5   Bladder Score 10   Bowels Score 10   Toilet Use Score 5   Transfers (Bed/Chair) Score 10   Mobility (Level Surface) Score 0   Stairs Score 0   Barthel Index Score 50   Licensure   [place] License Number  [place] [id number] [facility] OTR/L [id number]

## 2023-10-26 NOTE — ASSESSMENT & PLAN NOTE
Oxygenating at baseline room air at rest, however, with attempts at ambulation in the ED, patient became hypoxic with transient resolution once back at rest  Decadron regimen initiated due to transient need for oxygen with exertion  Maintain airborne/contact cautions  Monitor oxygenation  Denies fever/chills or worsening shortness of breath at rest  PRN Robitussin on board  Check procalcitonin -> observe off antibiotics for now

## 2023-10-26 NOTE — ASSESSMENT & PLAN NOTE
Likely multifactorial secondary to COVID infection coupled with progressive deconditioning  PT/OT input pending  Reported some occasional dizziness at various periods throughout the day of admission but currently denies -> CT of head negative for acute intracranial etiology

## 2023-10-26 NOTE — ASSESSMENT & PLAN NOTE
Likely reactive due to acute medical issue(s)  Monitor WBC count - anticipate fluctuation with corticosteroids

## 2023-10-26 NOTE — PLAN OF CARE
Problem: Potential for Falls  Goal: Patient will remain free of falls  Description: INTERVENTIONS:  - Educate patient/family on patient safety including physical limitations  - Instruct patient to call for assistance with activity   - Consult OT/PT to assist with strengthening/mobility   - Keep Call bell within reach  - Keep bed low and locked with side rails adjusted as appropriate  - Keep care items and personal belongings within reach  - Initiate and maintain comfort rounds  - Make Fall Risk Sign visible to staff  - Offer Toileting every 3 Hours, in advance of need  - Initiate/Maintain bed alarm  - Obtain necessary fall risk management equipment: yes  - Apply yellow socks and bracelet for high fall risk patients  - Consider moving patient to room near nurses station  Outcome: Progressing     Problem: MOBILITY - ADULT  Goal: Maintain or return to baseline ADL function  Description: INTERVENTIONS:  -  Assess patient's ability to carry out ADLs; assess patient's baseline for ADL function and identify physical deficits which impact ability to perform ADLs (bathing, care of mouth/teeth, toileting, grooming, dressing, etc.)  - Assess/evaluate cause of self-care deficits   - Assess range of motion  - Assess patient's mobility; develop plan if impaired  - Assess patient's need for assistive devices and provide as appropriate  - Encourage maximum independence but intervene and supervise when necessary  - Involve family in performance of ADLs  - Assess for home care needs following discharge   - Consider OT consult to assist with ADL evaluation and planning for discharge  - Provide patient education as appropriate  Outcome: Progressing  Goal: Maintains/Returns to pre admission functional level  Description: INTERVENTIONS:  - Perform BMAT or MOVE assessment daily.   - Set and communicate daily mobility goal to care team and patient/family/caregiver.    - Collaborate with rehabilitation services on mobility goals if consulted  - Perform Range of Motion 3 times a day. - Reposition patient every 3 hours.   - Dangle patient 3 times a day  - Stand patient 3 times a day  - Ambulate patient 3 times a day  - Out of bed to chair 3 times a day   - Out of bed for meals 3 times a day  - Out of bed for toileting  - Record patient progress and toleration of activity level   Outcome: Progressing     Problem: PAIN - ADULT  Goal: Verbalizes/displays adequate comfort level or baseline comfort level  Description: Interventions:  - Encourage patient to monitor pain and request assistance  - Assess pain using appropriate pain scale  - Administer analgesics based on type and severity of pain and evaluate response  - Implement non-pharmacological measures as appropriate and evaluate response  - Consider cultural and social influences on pain and pain management  - Notify physician/advanced practitioner if interventions unsuccessful or patient reports new pain  Outcome: Progressing     Problem: INFECTION - ADULT  Goal: Absence or prevention of progression during hospitalization  Description: INTERVENTIONS:  - Assess and monitor for signs and symptoms of infection  - Monitor lab/diagnostic results  - Monitor all insertion sites, i.e. indwelling lines, tubes, and drains  - Monitor endotracheal if appropriate and nasal secretions for changes in amount and color  - [place] appropriate cooling/warming therapies per order  - Administer medications as ordered  - Instruct and encourage patient and family to use good hand hygiene technique  - Identify and instruct in appropriate isolation precautions for identified infection/condition  Outcome: Progressing     Problem: DISCHARGE PLANNING  Goal: Discharge to home or other facility with appropriate resources  Description: INTERVENTIONS:  - Identify barriers to discharge w/patient and caregiver  - Arrange for needed discharge resources and transportation as appropriate  - Identify discharge learning needs (meds, wound care, etc.)  - Arrange for interpretive services to assist at discharge as needed  - Refer to Case Management Department for coordinating discharge planning if the patient needs post-hospital services based on physician/advanced practitioner order or complex needs related to functional status, cognitive ability, or social support system  Outcome: Progressing     Problem: Knowledge Deficit  Goal: Patient/family/caregiver demonstrates understanding of disease process, treatment plan, medications, and discharge instructions  Description: Complete learning assessment and assess knowledge base.   Interventions:  - Provide teaching at level of understanding  - Provide teaching via preferred learning methods  Outcome: Progressing

## 2023-10-26 NOTE — ED PROVIDER NOTES
History  Chief Complaint   Patient presents with    Dizziness     Patient states she has felt like passing out since breakfast today. C/o general weakness and chronic b/l hand pain from gout. 67-year-old female presents to the ED stating that she feels dizzy and weak and she has had a productive cough she states all of these have been going off and on for several months now. 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 Patient denies any falls or injuries. She is awake and alert did produce some sputum with a cough into her tissue here. .  Patient states that she has gout all over and is in constant pain all the time. She states none of the things that she is experiencing right now are new but she would like to know if there is anything that could make her feel better. Prior to Admission Medications   Prescriptions Last Dose Informant Patient Reported? Taking? Apixaban (ELIQUIS PO)   Yes No   Sig: Take 5 mg by mouth 2 (two) times a day   albuterol (PROVENTIL HFA,VENTOLIN HFA) 90 mcg/act inhaler   Yes No   Patient not taking: Reported on 3/8/2023   bisacodyl (bisacodyl) 5 mg EC tablet   Yes No   Sig: Take 5 mg by mouth daily as needed for constipation   carvedilol (COREG) 6.25 mg tablet   Yes No   Sig: Take 6.25 mg by mouth in the morning   clotrimazole (LOTRIMIN) 1 % cream   No No   Sig: Apply topically 2 (two) times a day   furosemide (LASIX) 20 mg tablet   Yes No   Sig: Take 20 mg by mouth in the morning   montelukast (SINGULAIR) 10 mg tablet   Yes No   Patient not taking: Reported on 3/8/2023      Facility-Administered Medications: None       Past Medical History:   Diagnosis Date    Cardiac disease     Lyme disease        Past Surgical History:   Procedure Laterality Date    CARDIAC PACEMAKER PLACEMENT      CHOLECYSTECTOMY      HYSTERECTOMY      JOINT REPLACEMENT         History reviewed. No pertinent family history.   I have reviewed and agree with the history as documented. E-Cigarette/Vaping    E-Cigarette Use Never User      E-Cigarette/Vaping Substances    Nicotine No     THC No     CBD No     Flavoring No     Other No     Unknown No      Social History     Tobacco Use    Smoking status: Never    Smokeless tobacco: Never   Vaping Use    Vaping Use: Never used   Substance Use Topics    Alcohol use: No    Drug use: No       Review of Systems   Constitutional:  Negative for activity change, chills, diaphoresis and fever. HENT:  Negative for congestion, ear pain, nosebleeds, sore throat, trouble swallowing and voice change. Eyes:  Negative for pain, discharge and redness. Respiratory:  Negative for apnea, cough, choking, shortness of breath, wheezing and stridor. Cardiovascular:  Negative for chest pain and palpitations. Gastrointestinal:  Negative for abdominal distention, abdominal pain, constipation, diarrhea, nausea and vomiting. Endocrine: Negative for polydipsia. Genitourinary:  Negative for difficulty urinating, dysuria, flank pain, frequency, hematuria and urgency. Musculoskeletal:  Positive for arthralgias. Negative for back pain, gait problem, joint swelling, myalgias, neck pain and neck stiffness. Skin:  Negative for pallor and rash. Neurological:  Negative for dizziness, tremors, syncope, speech difficulty, weakness, numbness and headaches. Hematological:  Negative for adenopathy. Psychiatric/Behavioral:  Negative for confusion, hallucinations, self-injury and suicidal ideas. The patient is not nervous/anxious. Physical Exam  Physical Exam  Vitals and nursing note reviewed. Constitutional:       General: She is not in acute distress. Appearance: She is well-developed. She is not diaphoretic. HENT:      Head: Normocephalic and atraumatic.       Right Ear: External ear normal.      Left Ear: External ear normal.      Nose: Nose normal.   Eyes:      Conjunctiva/sclera: Conjunctivae normal.      Pupils: Pupils are equal, round, and reactive to light. Cardiovascular:      Rate and Rhythm: Normal rate and regular rhythm. Heart sounds: Normal heart sounds. Pulmonary:      Effort: Pulmonary effort is normal.      Breath sounds: Normal breath sounds. Abdominal:      General: Bowel sounds are normal.      Palpations: Abdomen is soft. Tenderness: There is abdominal tenderness. Comments: Diffuse tenderness   Musculoskeletal:         General: Normal range of motion. Cervical back: Normal range of motion and neck supple. Skin:     General: Skin is warm and dry. Neurological:      Mental Status: She is alert and oriented to person, place, and time. Deep Tendon Reflexes: Reflexes are normal and symmetric. Psychiatric:         Behavior: Behavior is cooperative.          Vital Signs  ED Triage Vitals [10/26/23 1108]   Temperature Pulse Respirations Blood Pressure SpO2   98.1 °F (36.7 °C) 64 19 136/75 93 %      Temp Source Heart Rate Source Patient Position - Orthostatic VS BP Location FiO2 (%)   Temporal Monitor Sitting Left arm --      Pain Score       --           Vitals:    10/26/23 1108 10/26/23 1536   BP: 136/75 124/56   Pulse: 64 64   Patient Position - Orthostatic VS: Sitting Sitting         Visual Acuity      ED Medications  Medications   sodium chloride 0.9 % bolus 500 mL (0 mL Intravenous Stopped 10/26/23 1326)       Diagnostic Studies  Results Reviewed       Procedure Component Value Units Date/Time    HS Troponin I 2hr [[id number]] Collected: 10/26/23 1536    Lab Status: No result Specimen: Blood from Arm, Left     HS Troponin I 4hr [[id number]]     Lab Status: No result Specimen: Blood     COVID/FLU/RSV [[id number]]  (Abnormal) Collected: 10/26/23 1251    Lab Status: Final result Specimen: Nares from Nose Updated: 10/26/23 1336     SARS-CoV-2 Positive     INFLUENZA A PCR Negative     INFLUENZA B PCR Negative     RSV PCR Negative    Narrative:      FOR PEDIATRIC PATIENTS - copy/paste COVID Guidelines URL to browser: [email or web address]    SARS-CoV-2 assay is a Nucleic Acid Amplification assay intended for the  qualitative detection of nucleic acid from SARS-CoV-2 in nasopharyngeal  swabs. Results are for the presumptive identification of SARS-CoV-2 RNA. Positive results are indicative of infection with SARS-CoV-2, the virus  causing COVID-19, but do not rule out bacterial infection or co-infection  with other viruses. Laboratories within the [facility] and its  territories are required to report all positive results to the appropriate  public health authorities. Negative results do not preclude SARS-CoV-2  infection and should not be used as the sole basis for treatment or other  patient management decisions. Negative results must be combined with  clinical observations, patient history, and epidemiological information. This test has not been FDA cleared or approved. This test has been authorized by FDA under an Emergency Use Authorization  (EUA). This test is only authorized for the duration of time the  declaration that circumstances exist justifying the authorization of the  emergency use of an in vitro diagnostic tests for detection of SARS-CoV-2  virus and/or diagnosis of COVID-19 infection under section 564(b)(1) of  the Act, 21 U. S.C. 773TFC-3(W)(6), unless the authorization is terminated  or revoked sooner. The test has been validated but independent review by FDA  and CLIA is pending. Test performed using [employer] Gene[employer]pert: This RT-PCR assay targets N2,  a region unique to SARS-CoV-2. A conserved region in the E-gene was chosen  for pan-Sarbecovirus detection which includes SARS-CoV-2. According to CMS-2020-01-R, this platform meets the definition of high-throughput technology.     HS Troponin 0hr (reflex protocol) [[id number]]  (Normal) Collected: 10/26/23 1220    Lab Status: Final result Specimen: Blood from Arm, Left Updated: 10/26/23 1252     hs TnI 0hr 21 ng/L     B-Type Natriuretic Peptide(BNP) [[id number]]  (Normal) Collected: 10/26/23 1220    Lab Status: Final result Specimen: Blood from Arm, Left Updated: 10/26/23 1252     BNP 89 pg/mL     Comprehensive metabolic panel [[id number]]  (Abnormal) Collected: 10/26/23 1220    Lab Status: Final result Specimen: Blood from Arm, Left Updated: 10/26/23 1250     Sodium 136 mmol/L      Potassium 3.6 mmol/L      Chloride 102 mmol/L      CO2 27 mmol/L      ANION GAP 7 mmol/L      BUN 10 mg/dL      Creatinine 0.62 mg/dL      Glucose 110 mg/dL      Calcium 10.2 mg/dL      Corrected Calcium 10.8 mg/dL      AST 10 U/L      ALT 7 U/L      Alkaline Phosphatase 63 U/L      Total Protein 6.4 g/dL      Albumin 3.2 g/dL      Total Bilirubin 0.45 mg/dL      eGFR 81 ml/min/1.73sq m     Narrative:      [place] guidelines for Chronic Kidney Disease (CKD):     Stage 1 with normal or high GFR (GFR > 90 mL/min/1.73 square meters)    Stage 2 Mild CKD (GFR = 60-89 mL/min/1.73 square meters)    Stage 3A Moderate CKD (GFR = 45-59 mL/min/1.73 square meters)    Stage 3B Moderate CKD (GFR = 30-44 mL/min/1.73 square meters)    Stage 4 Severe CKD (GFR = 15-29 mL/min/1.73 square meters)    Stage 5 End Stage CKD (GFR <15 mL/min/1.73 square meters)  Note: GFR calculation is accurate only with a steady state creatinine    Lipase [[id number]]  (Normal) Collected: 10/26/23 1220    Lab Status: Final result Specimen: Blood from Arm, Left Updated: 10/26/23 1250     Lipase 16 u/L     Protime-INR [[id number]]  (Abnormal) Collected: 10/26/23 1220    Lab Status: Final result Specimen: Blood from Arm, Left Updated: 10/26/23 1247     Protime 19.5 seconds      INR 1.62    APTT [[id number]]  (Abnormal) Collected: 10/26/23 1220    Lab Status: Final result Specimen: Blood from Arm, Left Updated: 10/26/23 1247     PTT 39 seconds     CBC and differential [[id number]]  (Abnormal) Collected: 10/26/23 1220    Lab Status: Final result Specimen: Blood from Arm, Left Updated: 10/26/23 1229     WBC 14.09 Thousand/uL      RBC 3.98 Million/uL      Hemoglobin 11.0 g/dL      Hematocrit 34.9 %      MCV 88 fL      MCH 27.6 pg      MCHC 31.5 g/dL      RDW 14.4 %      MPV 9.5 fL      Platelets 953 Thousands/uL      nRBC 0 /100 WBCs      Neutrophils Relative 82 %      Immat GRANS % 0 %      Lymphocytes Relative 9 %      Monocytes Relative 8 %      Eosinophils Relative 1 %      Basophils Relative 0 %      Neutrophils Absolute 11.60 Thousands/µL      Immature Grans Absolute 0.06 Thousand/uL      Lymphocytes Absolute 1.25 Thousands/µL      Monocytes Absolute 1.05 Thousand/µL      Eosinophils Absolute 0.08 Thousand/µL      Basophils Absolute 0.05 Thousands/µL     UA (URINE) with reflex to Scope [[id number]]     Lab Status: No result Specimen: Urine                    CT head without contrast   Final Result by Jackson Farley MD (10/26 1402)      No mass effect, acute intracranial hemorrhage or evidence of recent infarction. Workstation performed: [id number]         XR chest 1 view portable    (Results Pending)              Procedures  Procedures         ED Course                               SBIRT 22yo+      Flowsheet Row Most Recent Value   Initial Alcohol Screen: US AUDIT-C     1. How often do you have a drink containing alcohol? 0 Filed at: 10/26/2023 1112   2. How many drinks containing alcohol do you have on a typical day you are drinking? 0 Filed at: 10/26/2023 1112   3a. Male UNDER 65: How often do you have five or more drinks on one occasion? 0 Filed at: 10/26/2023 1112   3b. FEMALE Any Age, or MALE 65+: How often do you have 4 or more drinks on one occassion? 0 Filed at: 10/26/2023 1112   Audit-C Score 0 Filed at: 10/26/2023 1112   BERNARDINO: How many times in the past year have you. .. Used an illegal drug or used a prescription medication for non-medical reasons?  Never Filed at: 10/26/2023 1112                      Medical Decision Making  Patient states she feels well enough to go home. We advised her that she did test positive for COVID. She states that she did have COVID 2 months ago. Patient has been afebrile here in the ED O2 sats have been 93% plus since arrival.  Patient does not appear to be short of breath occasionally does have cough and congestion which is productive. Amount and/or Complexity of Data Reviewed  Labs: ordered. Radiology: ordered. Risk  Decision regarding hospitalization. Disposition  Final diagnoses:   COVID   Weakness     Time reflects when diagnosis was documented in both MDM as applicable and the Disposition within this note       Time User Action Codes Description Comment    10/26/2023  2:17 PM Bhavya Torres Add [U07.1] COVID     10/26/2023  2:47 PM Bhavya Torres Add [R53.1] Weakness           ED Disposition       ED Disposition   Admit    Condition   Stable    Date/Time   Thu Oct 26, 2023 8766    Comment   Case was discussed with shen and the patient's admission status was agreed to be Admission Status: inpatient status to the service of Dr. Rhett Nageotte . Follow-up Information       Follow up With Specialties Details Why Contact Belen Patterson Ra, DO  Schedule an appointment as soon as possible for a visit  As needed [facility] [address]  [phone]              Patient's Medications   Discharge Prescriptions    No medications on file       No discharge procedures on file.     PDMP Review       None            ED Provider  Electronically Signed by             Bhavya Torres DO  10/26/23 [address] [facility],   10/26/23 1375

## 2023-10-26 NOTE — ASSESSMENT & PLAN NOTE
Likely multifactorial secondary to COVID infection coupled with progressive deconditioning and chronic history of gout  PT/OT input appreciated with recommendation of skilled rehab -> patient, however, refusing skilled rehab and prefers home health services  Reported some occasional dizziness at various periods throughout the day of admission but currently denies -> CT of head negative for acute intracranial etiology

## 2023-10-26 NOTE — ED NOTES
Pt SpO2 decreased to 89% on room air when ambulated by PT/OT. Provider Praful Wood at bedside and made aware. Pt states she is unable to give urine sample at this time. Pt c/o constipation which makes it hard for her to urinate. Pt last BM yesterday. Pt advised to call when she needs to be ambulated to bathroom. Call bell at bedside.       Aleida Voss, [address] [place] [address]  10/26/23 3725

## 2023-10-26 NOTE — ASSESSMENT & PLAN NOTE
Oxygenating at baseline room air at rest, however, with attempts at ambulation in the ED, patient became hypoxic with transient resolution once back at rest  Decadron regimen initiated due to transient need for oxygen with exertion  Maintain airborne/contact cautions  Monitor oxygenation  Denies fever/chills or worsening shortness of breath at rest  PRN Robitussin on board  Check procalcitonin -> observe off antibiotics for now  Check home O2 walking ambulatory study

## 2023-10-26 NOTE — PHYSICAL THERAPY NOTE
PT EVALUATION     10/26/23 1525   PT Last Visit   PT Visit Date 10/26/23   Note Type   Note type Evaluation   Pain Assessment   Pain Assessment Tool Valiente-Baker FACES   Valiente-Baker FACES Pain Rating 6   Pain Location/Orientation Location: Back   Restrictions/Precautions   Other Precautions Airborne/isolation;Contact/isolation;Pain; [address] One level;Ramped entrance   [place]; Wheelchair-manual;Hospital bed   Prior Function   Level of [place]   (ambulates short distances with a walker or uses a WC, assist with ADL)   Lives With Alone   Receives Help From Personal care attendant 6x per week to cook, clean, assist with ADLs  (however pt's HHA will not see pt now that she is COVID +)   Falls in the last 6 months 1 to 4   General   Additional Pertinent History Pt seen in the ED with generalized weakness, COVID+, c/o back pain. Pt was recently [address] home from [facility] after a fall. Cognition   Overall Cognitive Status WFL   RLE Assessment   RLE Assessment   (AAROM WFL, MMT hip 2/5, knee 4-/5, ankle 3+/5)   LLE Assessment   LLE Assessment   (AAROM WFL, MMT hip 2/5, knee 4-/5, ankle 3+/5)   Bed Mobility   Supine to Sit 4  Minimal assistance   Sit to Supine 4  Minimal assistance   Additional items LE management; Increased time required   Transfers   Sit to Stand 4  Minimal assistance   Stand to Sit 4  Minimal assistance   Stand pivot 4  Minimal assistance   Additional items   (with walker)   Ambulation/Elevation   Gait pattern Forward Flexion; Wide HERMINIA  (slow chad, flexed posture)   Gait Assistance 4  Minimal assist   Assistive Device Rolling walker   Distance 20 feet;   Sp20 dropped to 89% on RA with activity   Balance   Static Sitting Fair +   Dynamic Sitting Fair   Static Standing Fair   Ambulatory Fair -   Activity Tolerance   Activity Tolerance Patient limited by fatigue   Assessment   Prognosis Good   Problem List Decreased strength;Decreased range of motion;Decreased endurance; Impaired balance;Decreased mobility;Pain;Obesity   Assessment Patient is an 81y.o. year old female seen for Physical Therapy evaluation. Patient admitted with COVID+, weakness. Comorbidities affecting patient's physical performance include: gout, falls. Personal factors affecting patient at time of initial evaluation include: ambulating with assistive device, inability to ambulate household distances, limited home support, positive fall history, inability to perform ADLS, and inability to live alone. Prior to admission, patient was independent with functional mobility with walker or WC and requiring assist for ADLS. Please find objective findings from Physical Therapy assessment regarding body systems outlined above with impairments and limitations including weakness, decreased ROM, impaired balance, decreased endurance, gait deviations, pain, decreased activity tolerance, decreased functional mobility tolerance, and fall risk. The Barthel Index was used as a functional outcome tool presenting with a score of Barthel Index Score: 55 today indicating marked limitations of functional mobility and ADLS. Patient's clinical presentation is currently unstable/unpredictable as seen in patient's presentation of changing level of pain, increased fall risk, new onset of impairment of functional mobility, decreased endurance, and new onset of weakness. Pt would benefit from continued Physical Therapy treatment to address deficits as defined above and maximize level of functional mobility. As demonstrated by objective findings, the assigned level of complexity for this evaluation is high. The patient's -[facility] Basic Mobility Inpatient Short Form Raw Score is 14. A Raw score of less than or equal to 16 suggests the patient may benefit from discharge to post-acute rehabilitation services.    Goals   Patient Goals "go home, feel better"   [facility] Expiration Date 11/02/23   Short Term Goal #1 1.improve bed mobility to supervision   2. improve tranfers to supervision   3. pt will ambulate with a walker x 50 feet with min assist   LTG Expiration Date 11/09/23   Long Term Goal #1 1. independent bed mobility  2.independent transfers  3. modified independent to ambulate indoor level surfaces with a walker x 50 feet , no falls   Plan   Treatment/Interventions Therapeutic exercise;LE strengthening/ROM; Functional transfer training;ADL retraining; Endurance training;Bed mobility;Gait training;Equipment eval/education;Patient/family training;Spoke to MD   PT Frequency Other (Comment)  (5x/week)   Discharge Recommendation   PT Discharge Recommendation Post acute rehabilitation services   AM-PAC Basic Mobility Inpatient   Turning in Flat Bed Without Bedrails 2   Lying on Back to Sitting on Edge of Flat Bed Without Bedrails 2   Moving Bed to Chair 3   Standing Up From Chair Using Arms 3   Walk in Room 3   Climb 3-5 Stairs With Railing 1   Basic Mobility Inpatient Raw Score 14   Basic Mobility Standardized Score 35.55   Highest Level Of Mobility   -[facility] Goal 4: Move to chair/commode   -HL Achieved 6: Walk 10 steps or more   Barthel Index   Feeding 10   Bathing 0   Grooming Score 5   Dressing Score 5   Bladder Score 10   Bowels Score 10   Toilet Use Score 5   Transfers (Bed/Chair) Score 10   Mobility (Level Surface) Score 0   Stairs Score 0   Barthel Index Score 55   End of Consult   Patient Position at End of Consult All needs within reach; Supine   Licensure   [place] License Number  James Parra PT [id number]

## 2023-10-27 LAB
ANION GAP SERPL CALCULATED.3IONS-SCNC: 4 MMOL/L
BASOPHILS # BLD AUTO: 0.03 THOUSANDS/ÂΜL (ref 0–0.1)
BASOPHILS NFR BLD AUTO: 0 % (ref 0–1)
BUN SERPL-MCNC: 9 MG/DL (ref 5–25)
CALCIUM SERPL-MCNC: 9.9 MG/DL (ref 8.4–10.2)
CHLORIDE SERPL-SCNC: 105 MMOL/L (ref 96–108)
CO2 SERPL-SCNC: 29 MMOL/L (ref 21–32)
CREAT SERPL-MCNC: 0.55 MG/DL (ref 0.6–1.3)
EOSINOPHIL # BLD AUTO: 0.01 THOUSAND/ÂΜL (ref 0–0.61)
EOSINOPHIL NFR BLD AUTO: 0 % (ref 0–6)
ERYTHROCYTE [DISTWIDTH] IN BLOOD BY AUTOMATED COUNT: 14.2 % (ref 11.6–15.1)
GFR SERPL CREATININE-BSD FRML MDRD: 84 ML/MIN/1.73SQ M
GLUCOSE P FAST SERPL-MCNC: 109 MG/DL (ref 65–99)
GLUCOSE SERPL-MCNC: 109 MG/DL (ref 65–140)
HCT VFR BLD AUTO: 32.2 % (ref 34.8–46.1)
HGB BLD-MCNC: 10.1 G/DL (ref 11.5–15.4)
IMM GRANULOCYTES # BLD AUTO: 0.05 THOUSAND/UL (ref 0–0.2)
IMM GRANULOCYTES NFR BLD AUTO: 1 % (ref 0–2)
LYMPHOCYTES # BLD AUTO: 1.65 THOUSANDS/ÂΜL (ref 0.6–4.47)
LYMPHOCYTES NFR BLD AUTO: 18 % (ref 14–44)
MCH RBC QN AUTO: 27.8 PG (ref 26.8–34.3)
MCHC RBC AUTO-ENTMCNC: 31.4 G/DL (ref 31.4–37.4)
MCV RBC AUTO: 89 FL (ref 82–98)
MONOCYTES # BLD AUTO: 0.6 THOUSAND/ÂΜL (ref 0.17–1.22)
MONOCYTES NFR BLD AUTO: 6 % (ref 4–12)
NEUTROPHILS # BLD AUTO: 7.02 THOUSANDS/ÂΜL (ref 1.85–7.62)
NEUTS SEG NFR BLD AUTO: 75 % (ref 43–75)
NRBC BLD AUTO-RTO: 0 /100 WBCS
PLATELET # BLD AUTO: 437 THOUSANDS/UL (ref 149–390)
PMV BLD AUTO: 9.9 FL (ref 8.9–12.7)
POTASSIUM SERPL-SCNC: 3.7 MMOL/L (ref 3.5–5.3)
PROCALCITONIN SERPL-MCNC: 0.05 NG/ML
RBC # BLD AUTO: 3.63 MILLION/UL (ref 3.81–5.12)
SODIUM SERPL-SCNC: 138 MMOL/L (ref 135–147)
WBC # BLD AUTO: 9.36 THOUSAND/UL (ref 4.31–10.16)

## 2023-10-27 PROCEDURE — 99232 SBSQ HOSP IP/OBS MODERATE 35: CPT | Performed by: INTERNAL MEDICINE

## 2023-10-27 PROCEDURE — 84145 PROCALCITONIN (PCT): CPT | Performed by: INTERNAL MEDICINE

## 2023-10-27 PROCEDURE — 80048 BASIC METABOLIC PNL TOTAL CA: CPT | Performed by: INTERNAL MEDICINE

## 2023-10-27 PROCEDURE — 85025 COMPLETE CBC W/AUTO DIFF WBC: CPT | Performed by: INTERNAL MEDICINE

## 2023-10-27 RX ADMIN — DOCUSATE SODIUM 100 MG: 100 CAPSULE, LIQUID FILLED ORAL at 17:52

## 2023-10-27 RX ADMIN — DOCUSATE SODIUM 100 MG: 100 CAPSULE, LIQUID FILLED ORAL at 10:29

## 2023-10-27 RX ADMIN — RISPERIDONE 0.25 MG: 0.25 TABLET, FILM COATED ORAL at 10:30

## 2023-10-27 RX ADMIN — FAMOTIDINE 40 MG: 20 TABLET ORAL at 10:30

## 2023-10-27 RX ADMIN — SERTRALINE 25 MG: 25 TABLET, FILM COATED ORAL at 10:29

## 2023-10-27 RX ADMIN — APIXABAN 5 MG: 5 TABLET, FILM COATED ORAL at 10:30

## 2023-10-27 RX ADMIN — DEXAMETHASONE SODIUM PHOSPHATE 6 MG: 4 INJECTION INTRA-ARTICULAR; INTRALESIONAL; INTRAMUSCULAR; INTRAVENOUS; SOFT TISSUE at 15:41

## 2023-10-27 RX ADMIN — MONTELUKAST 10 MG: 10 TABLET, FILM COATED ORAL at 21:37

## 2023-10-27 RX ADMIN — FUROSEMIDE 20 MG: 20 TABLET ORAL at 06:17

## 2023-10-27 RX ADMIN — CARVEDILOL 6.25 MG: 6.25 TABLET, FILM COATED ORAL at 10:30

## 2023-10-27 RX ADMIN — APIXABAN 5 MG: 5 TABLET, FILM COATED ORAL at 21:37

## 2023-10-27 NOTE — CASE MANAGEMENT
Case Management Assessment & Discharge Planning Note    Patient name Orlando Roosevelt  Location 3 [facility] 314/3 315 [facility]-* MRN [id number]  : 1935 Date 10/27/2023       Current Admission Date: 10/26/2023  Current Admission Diagnosis:Generalized weakness   Patient Active Problem List    Diagnosis Date Noted    Generalized weakness 10/26/2023    COVID-19 virus infection 10/26/2023    Leukocytosis 10/26/2023    Thrombocytosis 10/26/2023    Atrial fibrillation ([address]) 10/26/2023    GERD (gastroesophageal reflux disease) 10/26/2023    Psychiatric disorder 10/26/2023    History of asthma 10/26/2023      LOS (days): 0  Geometric Mean LOS (GMLOS) (days):   Days to GMLOS:     OBJECTIVE:    Risk of Unplanned Readmission Score: 14.25       Current admission status: Inpatient  Referral Reason: Other (Discharge planning)    Preferred Pharmacy:   [address] [place] [address]  Phone: [phone] Fax: [phone]    Primary Care Provider: Steff Wilkins DO    Primary Insurance: MEDICARE  Secondary Insurance: [facility]    ASSESSMENT:  Edie Proxies    There are no active Health Care Proxies on file. Obs Notice Signed: 10/27/23 (Notice reviewed with and signed by patient. Copy given to patient, copy placed in scan bin for chart.)    Readmission Root Cause  30 Day Readmission: No    Patient Information  Admitted from[de-identified] Home  Mental Status: Alert  During Assessment patient was accompanied by: Not accompanied during assessment  Assessment information provided by[de-identified] Patient  Primary Caregiver: Other (Comment)  Caregiver's Name[de-identified] July Barnes (private caregiver)  Caregiver's Relationship to Patient[de-identified] Other (Specify)  Caregiver's Telephone Number[de-identified] [phone]  Support Systems: Daughter, [facility] of Residence: [address] [place] [address] do you live in?: [facility] entry access options. Select all that apply.: Ramp  Type of Current Residence: [place]  In the last 12 months, was there a time when you were not able to pay the mortgage or rent on time?: No  In the last 12 months, how many places have you lived?: 1  In the last 12 months, was there a time when you did not have a steady place to sleep or slept in a shelter (including now)?: No  Homeless/housing insecurity resource given?: N/A  Living Arrangements: Lives Alone    Activities of Daily Living Prior to Admission  Functional Status: Assistance  Completes ADLs independently?: No  Level of ADL dependence: Assistance  Ambulates independently?: Yes  Does patient use assisted devices?: Yes  Assisted Devices (DME) used: Preethi Rim, Wheelchair, Hospital Bed  Does patient currently own DME?: Yes  What DME does the patient currently own?: Hospital Bed, Walker, Wheelchair  Does the patient have a history of Short-Term Rehab?: Yes ([facility] [address] [facility])  Does patient have a history of HHC?: Yes ([facility])  Does patient currently have [facility]?: No     Patient Information Continued  Income Source: Pension/long term  Does patient have prescription coverage?: Yes  Within the past 12 months, you worried that your food would run out before you got the money to buy more.: Never true  Within the past 12 months, the food you bought just didn't last and you didn't have money to get more.: Never true  Food insecurity resource given?: N/A  Does patient receive dialysis treatments?: No      DISCHARGE DETAILS:    Discharge planning discussed with[de-identified] Patient  Freedom of Choice: Yes    Comments - Freedom of Choice: SW spoke with patient at bedside to introduce role of CM, conduct assessment and discuss discharge planning. Patient lives alone but has a caregiver Mercedes Israel who is with her 6 days per week and per patient, was planning to start staying overnight with patient at home.   Therapy note indicates that caregiver is not willing to stay with patient while she is COVID positive but patient claims that this is not true. Patient provided contact information for Moy English [phone] for SW to call her to confirm. SW called with no answer and a voice mail was left. SW reviewed recommendation for STR with patient and she stated that she was just discharged from [facility] at [address] [facility] and she does not want to go back to STR. Patient's preference is to return home with [facility] [address]. She previously had [facility] but would prefer to use a different agency. SW attempted to call patient's daughter Red Obando twice but an automated message indicated that she was not available and a message could not be left. SW will continue to attempt to contact family/caregiver and will continue to follow. CM contacted family/caregiver?: No- see comments (Voice mail left for caregiver Patsy Stephens, unable to leave a message for daughter)  Were Treatment Team discharge recommendations reviewed with patient/caregiver?: Yes  Did patient/caregiver verbalize understanding of patient care needs?: Yes  Were patient/caregiver advised of the risks associated with not following Treatment Team discharge recommendations?: Yes    Contacts  Patient Contacts:  Darinmode Rolo (daughter)  Relationship to Patient[de-identified] Family  Contact Method: Phone  Phone Number: [phone]  Reason/Outcome: Emergency Contact    Treatment Team Recommendation: Short Term Rehab

## 2023-10-27 NOTE — PLAN OF CARE
Problem: Potential for Falls  Goal: Patient will remain free of falls  Description: INTERVENTIONS:  - Educate patient/family on patient safety including physical limitations  - Instruct patient to call for assistance with activity   - Consult OT/PT to assist with strengthening/mobility   - Keep Call bell within reach  - Keep bed low and locked with side rails adjusted as appropriate  - Keep care items and personal belongings within reach  - Initiate and maintain comfort rounds  - Make Fall Risk Sign visible to staff  - Offer Toileting every 2 Hours, in advance of need  - Initiate/Maintain bed alarm  - Apply yellow socks and bracelet for high fall risk patients  - Consider moving patient to room near nurses station  Outcome: Progressing     Problem: MOBILITY - ADULT  Goal: Maintain or return to baseline ADL function  Description: INTERVENTIONS:  -  Assess patient's ability to carry out ADLs; assess patient's baseline for ADL function and identify physical deficits which impact ability to perform ADLs (bathing, care of mouth/teeth, toileting, grooming, dressing, etc.)  - Assess/evaluate cause of self-care deficits   - Assess range of motion  - Assess patient's mobility; develop plan if impaired  - Assess patient's need for assistive devices and provide as appropriate  - Encourage maximum independence but intervene and supervise when necessary  - Involve family in performance of ADLs  - Assess for home care needs following discharge   - Consider OT consult to assist with ADL evaluation and planning for discharge  - Provide patient education as appropriate  Outcome: Progressing  Goal: Maintains/Returns to pre admission functional level  Description: INTERVENTIONS:  - Perform BMAT or MOVE assessment daily.   - Set and communicate daily mobility goal to care team and patient/family/caregiver. - Collaborate with rehabilitation services on mobility goals if consulted  - Perform Range of Motion 2 times a day.   - Reposition patient every 2 hours.   - Dangle patient 2 times a day  - Stand patient 2 times a day  - Ambulate patient 2 times a day  - Out of bed to chair 2 times a day   - Out of bed for meals 2 times a day  - Out of bed for toileting  - Record patient progress and toleration of activity level   Outcome: Progressing     Problem: PAIN - ADULT  Goal: Verbalizes/displays adequate comfort level or baseline comfort level  Description: Interventions:  - Encourage patient to monitor pain and request assistance  - Assess pain using appropriate pain scale  - Administer analgesics based on type and severity of pain and evaluate response  - Implement non-pharmacological measures as appropriate and evaluate response  - Consider cultural and social influences on pain and pain management  - Notify physician/advanced practitioner if interventions unsuccessful or patient reports new pain  Outcome: Progressing     Problem: INFECTION - ADULT  Goal: Absence or prevention of progression during hospitalization  Description: INTERVENTIONS:  - Assess and monitor for signs and symptoms of infection  - Monitor lab/diagnostic results  - Monitor all insertion sites, i.e. indwelling lines, tubes, and drains  - Monitor endotracheal if appropriate and nasal secretions for changes in amount and color  - [place] appropriate cooling/warming therapies per order  - Administer medications as ordered  - Instruct and encourage patient and family to use good hand hygiene technique  - Identify and instruct in appropriate isolation precautions for identified infection/condition  Outcome: Progressing     Problem: DISCHARGE PLANNING  Goal: Discharge to home or other facility with appropriate resources  Description: INTERVENTIONS:  - Identify barriers to discharge w/patient and caregiver  - Arrange for needed discharge resources and transportation as appropriate  - Identify discharge learning needs (meds, wound care, etc.)  - Arrange for interpretive services to assist at discharge as needed  - Refer to Case Management Department for coordinating discharge planning if the patient needs post-hospital services based on physician/advanced practitioner order or complex needs related to functional status, cognitive ability, or social support system  Outcome: Progressing     Problem: Knowledge Deficit  Goal: Patient/family/caregiver demonstrates understanding of disease process, treatment plan, medications, and discharge instructions  Description: Complete learning assessment and assess knowledge base.   Interventions:  - Provide teaching at level of understanding  - Provide teaching via preferred learning methods  Outcome: Progressing     Problem: Prexisting or High Potential for Compromised Skin Integrity  Goal: Skin integrity is maintained or improved  Description: INTERVENTIONS:  - Identify patients at risk for skin breakdown  - Assess and monitor skin integrity  - Assess and monitor nutrition and hydration status  - Monitor labs   - Assess for incontinence   - Turn and reposition patient  - Assist with mobility/ambulation  - Relieve pressure over bony prominences  - Avoid friction and shearing  - Provide appropriate hygiene as needed including keeping skin clean and dry  - Evaluate need for skin moisturizer/barrier cream  - Collaborate with interdisciplinary team   - Patient/family teaching  - Consider wound care consult   Outcome: Progressing

## 2023-10-27 NOTE — PROGRESS NOTES
[address] [facility]  Progress Note  Name: Jensen Monk I  MRN: [id number]  Unit/Bed#: 3 [facility] 314-01 I Date of Admission: 10/26/2023   Date of Service: 10/27/2023 I Hospital Day: 0      Assessment & Plan:    * Generalized weakness  Assessment & Plan  Likely multifactorial secondary to COVID infection coupled with progressive deconditioning and chronic history of gout  PT/OT input appreciated with recommendation of skilled rehab -> patient, however, refusing skilled rehab and prefers home health services  Reported some occasional dizziness at various periods throughout the day of admission but currently denies -> CT of head negative for acute intracranial etiology    COVID-19 virus infection  Assessment & Plan  Oxygenating at baseline room air at rest, however, with attempts at ambulation in the ED, patient became hypoxic with transient resolution once back at rest  Decadron regimen initiated due to transient need for oxygen with exertion  Maintain airborne/contact cautions  Monitor oxygenation  Denies fever/chills or worsening shortness of breath at rest  PRN Robitussin on board  Check procalcitonin -> observe off antibiotics for now  Check home O2 walking ambulatory study    Leukocytosis  Assessment & Plan  Likely reactive due to acute medical issue(s)  Monitor WBC count - anticipate fluctuation with corticosteroids    Thrombocytosis  Assessment & Plan  Likely reactive due to acute medical issue(s)  Monitor platelet count    Atrial fibrillation (HCC)  Assessment & Plan  Rate controlled on Coreg  Chronically on Eliquis for anticoagulation  Status post pacemaker    GERD (gastroesophageal reflux disease)  Assessment & Plan  Continue Pepcid    Psychiatric disorder  Assessment & Plan  Continue Zoloft/Risperdal    History of asthma  Assessment & Plan  Stable/asymptomatic  Continue Singulair and PRN Albuterol      DVT Prophylaxis:  Eliquis    Patient Centered Rounds:  I have performed bedside rounds and discussed plan of care with nursing today. Discussions with Specialists or Other Care Team Provider:  see above assessments if applicable    Education and Discussions with Family / Patient:  Patient at bedside    Time Spent for Care:  35 minutes. More than 50% of total time spent on counseling and coordination of care, on one or more of the following: performing physical exam; counseling and coordination of care, obtaining or reviewing history, documenting in the medical record, reviewing/ordering tests/medications/procedures, and communicating with other healthcare professionals. Current Length of Stay: 0 day(s)  Current Patient Status: Inpatient   Certification Statement:  Patient will continue to require additional hospital stay due to assessments as noted above. Code Status: Level 1 - Full Code        Subjective:     Encountered earlier in the day. Still complains of some residual fatigue and weakness, but, denies worsening shortness of breath. Objective:     Vitals:   Temp (24hrs), Av.3 °F (36.8 °C), Min:97.6 °F (36.4 °C), Max:98.9 °F (37.2 °C)    Temp:  [97.6 °F (36.4 °C)-98.9 °F (37.2 °C)] 97.8 °F (36.6 °C)  HR:  [59-60] 60  Resp:  [18-20] 18  BP: (108-124)/(51-82) 114/56  SpO2:  [90 %-94 %] 92 %  Body mass index is 39.99 kg/m². Input and Output Summary (last 24 hours):        Intake/Output Summary (Last 24 hours) at 10/27/2023 1843  Last data filed at 10/27/2023 1800  Gross per 24 hour   Intake 250 ml   Output 1400 ml   Net -1150 ml       Physical Exam:     GENERAL Waxing/waning weakness/fatigue   HEAD   Normocephalic - atraumatic   EYES   PERRL - EOMI    MOUTH   Mucosa moist   NECK   Supple - full range of motion   CARDIAC Rate controlled - S1/S2 positive   PULMONARY    Clear breath sounds bilaterally - nonlabored respirations   ABDOMEN   Soft - nontender/nondistended - active bowel sounds   MUSCULOSKELETAL   Motor strength/range of motion deconditioned   NEUROLOGIC   Alert/oriented at baseline   SKIN   Chronic wrinkles/blemishes    PSYCHIATRIC   Mood/affect stable         Additional Data:     Labs & Recent Cultures:    Results from last 7 days   Lab Units 10/27/23  0504   WBC Thousand/uL 9.36   HEMOGLOBIN g/dL 10.1*   HEMATOCRIT % 32.2*   PLATELETS Thousands/uL 437*   NEUTROS PCT % 75   LYMPHS PCT % 18   MONOS PCT % 6   EOS PCT % 0     Results from last 7 days   Lab Units 10/27/23  0504 10/26/23  1220   POTASSIUM mmol/L 3.7 3.6   CHLORIDE mmol/L 105 102   CO2 mmol/L 29 27   BUN mg/dL 9 10   CREATININE mg/dL 0.55* 0.62   CALCIUM mg/dL 9.9 10.2   ALK PHOS U/L  --  63   ALT U/L  --  7   AST U/L  --  10*     Results from last 7 days   Lab Units 10/26/23  1220   INR  1.62*             Results from last 7 days   Lab Units 10/27/23  0504 10/26/23  1220   PROCALCITONIN ng/ml 0.05 0.05                 Lines/Drains:  Invasive Devices       Peripheral Intravenous Line  Duration             Peripheral IV 10/26/23 Left Antecubital 1 day              Drain  Duration             External Urinary Catheter <1 day                      Last 24 Hours Medication List:   Current Facility-Administered Medications   Medication Dose Route Frequency Provider Last Rate    acetaminophen  650 mg Oral Q6H PRN Ana Elias MD      albuterol  2 puff Inhalation Q4H PRN Ana Elias MD      apixaban  5 mg Oral BID Ana Elias MD      bisacodyl  5 mg Oral Daily PRN Ana Elias MD      carvedilol  6.25 mg Oral Daily Ana Elias MD      dexamethasone  6 mg Intravenous Q24H Ana Elias MD      dextromethorphan-guaiFENesin  10 mL Oral Q4H PRN Ana Elias MD      docusate sodium  100 mg Oral BID Ana Elias MD      famotidine  40 mg Oral Daily Ana Elias MD      furosemide  20 mg Oral Early Morning Ana Elias MD      montelukast  10 mg Oral HS Ana Elias MD      ondansetron  4 mg Intravenous Q4H PRN Ana Elias MD      risperiDONE  0.25 mg Oral Daily Ana Elias MD      sertraline  25 mg Oral Daily Milady Chivo Thorne MD                  ** Please Note: This note is constructed using a voice recognition dictation system. An occasional wrong word/phrase or “sound-a-like” substitution may have been picked up by dictation device due to the inherent limitations of voice recognition software. Read the chart carefully and recognize, using reasonable context, where substitutions may have occurred. **

## 2023-10-28 LAB
ANION GAP SERPL CALCULATED.3IONS-SCNC: 5 MMOL/L
BACTERIA UR QL AUTO: ABNORMAL /HPF
BASOPHILS # BLD AUTO: 0.02 THOUSANDS/ÂΜL (ref 0–0.1)
BASOPHILS NFR BLD AUTO: 0 % (ref 0–1)
BILIRUB UR QL STRIP: NEGATIVE
BUN SERPL-MCNC: 15 MG/DL (ref 5–25)
CALCIUM SERPL-MCNC: 10.2 MG/DL (ref 8.4–10.2)
CHLORIDE SERPL-SCNC: 106 MMOL/L (ref 96–108)
CLARITY UR: ABNORMAL
CO2 SERPL-SCNC: 27 MMOL/L (ref 21–32)
COLOR UR: YELLOW
CREAT SERPL-MCNC: 0.58 MG/DL (ref 0.6–1.3)
EOSINOPHIL # BLD AUTO: 0.02 THOUSAND/ÂΜL (ref 0–0.61)
EOSINOPHIL NFR BLD AUTO: 0 % (ref 0–6)
ERYTHROCYTE [DISTWIDTH] IN BLOOD BY AUTOMATED COUNT: 14.3 % (ref 11.6–15.1)
GFR SERPL CREATININE-BSD FRML MDRD: 83 ML/MIN/1.73SQ M
GLUCOSE SERPL-MCNC: 107 MG/DL (ref 65–140)
GLUCOSE UR STRIP-MCNC: NEGATIVE MG/DL
HCT VFR BLD AUTO: 32.9 % (ref 34.8–46.1)
HGB BLD-MCNC: 10.1 G/DL (ref 11.5–15.4)
HGB UR QL STRIP.AUTO: ABNORMAL
IMM GRANULOCYTES # BLD AUTO: 0.03 THOUSAND/UL (ref 0–0.2)
IMM GRANULOCYTES NFR BLD AUTO: 0 % (ref 0–2)
KETONES UR STRIP-MCNC: NEGATIVE MG/DL
LEUKOCYTE ESTERASE UR QL STRIP: ABNORMAL
LYMPHOCYTES # BLD AUTO: 1.79 THOUSANDS/ÂΜL (ref 0.6–4.47)
LYMPHOCYTES NFR BLD AUTO: 19 % (ref 14–44)
MCH RBC QN AUTO: 26.8 PG (ref 26.8–34.3)
MCHC RBC AUTO-ENTMCNC: 30.7 G/DL (ref 31.4–37.4)
MCV RBC AUTO: 87 FL (ref 82–98)
MONOCYTES # BLD AUTO: 0.7 THOUSAND/ÂΜL (ref 0.17–1.22)
MONOCYTES NFR BLD AUTO: 8 % (ref 4–12)
NEUTROPHILS # BLD AUTO: 6.66 THOUSANDS/ÂΜL (ref 1.85–7.62)
NEUTS SEG NFR BLD AUTO: 73 % (ref 43–75)
NITRITE UR QL STRIP: NEGATIVE
NON-SQ EPI CELLS URNS QL MICRO: ABNORMAL /HPF
NRBC BLD AUTO-RTO: 0 /100 WBCS
PH UR STRIP.AUTO: 5.5 [PH]
PLATELET # BLD AUTO: 469 THOUSANDS/UL (ref 149–390)
PMV BLD AUTO: 9.5 FL (ref 8.9–12.7)
POTASSIUM SERPL-SCNC: 3.7 MMOL/L (ref 3.5–5.3)
PROT UR STRIP-MCNC: NEGATIVE MG/DL
RBC # BLD AUTO: 3.77 MILLION/UL (ref 3.81–5.12)
RBC #/AREA URNS AUTO: ABNORMAL /HPF
SODIUM SERPL-SCNC: 138 MMOL/L (ref 135–147)
SP GR UR STRIP.AUTO: 1.01 (ref 1–1.03)
UROBILINOGEN UR QL STRIP.AUTO: 0.2 E.U./DL
WBC # BLD AUTO: 9.22 THOUSAND/UL (ref 4.31–10.16)
WBC #/AREA URNS AUTO: ABNORMAL /HPF

## 2023-10-28 PROCEDURE — 99232 SBSQ HOSP IP/OBS MODERATE 35: CPT | Performed by: INTERNAL MEDICINE

## 2023-10-28 PROCEDURE — 80048 BASIC METABOLIC PNL TOTAL CA: CPT | Performed by: INTERNAL MEDICINE

## 2023-10-28 PROCEDURE — 85025 COMPLETE CBC W/AUTO DIFF WBC: CPT | Performed by: INTERNAL MEDICINE

## 2023-10-28 PROCEDURE — 81001 URINALYSIS AUTO W/SCOPE: CPT | Performed by: EMERGENCY MEDICINE

## 2023-10-28 PROCEDURE — 94761 N-INVAS EAR/PLS OXIMETRY MLT: CPT

## 2023-10-28 RX ADMIN — APIXABAN 5 MG: 5 TABLET, FILM COATED ORAL at 10:25

## 2023-10-28 RX ADMIN — APIXABAN 5 MG: 5 TABLET, FILM COATED ORAL at 21:15

## 2023-10-28 RX ADMIN — FAMOTIDINE 40 MG: 20 TABLET ORAL at 10:25

## 2023-10-28 RX ADMIN — FUROSEMIDE 20 MG: 20 TABLET ORAL at 05:37

## 2023-10-28 RX ADMIN — RISPERIDONE 0.25 MG: 0.25 TABLET, FILM COATED ORAL at 10:25

## 2023-10-28 RX ADMIN — SERTRALINE 25 MG: 25 TABLET, FILM COATED ORAL at 10:25

## 2023-10-28 RX ADMIN — CARVEDILOL 6.25 MG: 6.25 TABLET, FILM COATED ORAL at 10:25

## 2023-10-28 RX ADMIN — DEXAMETHASONE SODIUM PHOSPHATE 6 MG: 4 INJECTION INTRA-ARTICULAR; INTRALESIONAL; INTRAMUSCULAR; INTRAVENOUS; SOFT TISSUE at 16:13

## 2023-10-28 RX ADMIN — DOCUSATE SODIUM 100 MG: 100 CAPSULE, LIQUID FILLED ORAL at 10:25

## 2023-10-28 RX ADMIN — MONTELUKAST 10 MG: 10 TABLET, FILM COATED ORAL at 21:15

## 2023-10-28 NOTE — ASSESSMENT & PLAN NOTE
Likely multifactorial secondary to COVID infection coupled with progressive deconditioning and chronic history of gout  PT/OT input appreciated with recommendation of skilled rehab -> patient, however, refusing skilled rehab and prefers home health services  Reported some occasional dizziness at various periods throughout the day of admission but currently denies -> CT of head negative for acute intracranial etiology  Initial plan was for discharge today with caretaker, however, patient now agreeable to skilled rehab placement -> case management assistance to be appreciated

## 2023-10-28 NOTE — ASSESSMENT & PLAN NOTE
Oxygenating at baseline room air at rest, however, with attempts at ambulation in the ED, patient became hypoxic with transient resolution once back at rest  Decadron regimen initiated due to transient need for oxygen with exertion  Maintain airborne/contact cautions  Monitor oxygenation  Denies fever/chills or worsening shortness of breath at rest  PRN Robitussin on board  Serial procalcitonins normal -> observe off antibiotics for now  No need for home oxygen based on ambulatory study

## 2023-10-28 NOTE — CASE MANAGEMENT
Case Management Progress Note    Patient name Alejo Smith  Location 3 [facility] 314/3 [facility]-* MRN [id number]  : 1935 Date 10/28/2023       LOS (days): 1  Geometric Mean LOS (GMLOS) (days): 2.50  Days to GMLOS:1.6        OBJECTIVE:      Current admission status: Inpatient  Preferred Pharmacy:   [address] [place] [address]  Phone: [phone] Fax: [phone]    Primary Care Provider: Mara Daniel DO    Primary Insurance: MEDICARE  Secondary Insurance: [employer]    PROGRESS NOTE:    DEBORAH attempted to reach patient's daughter in the AM and again in the PM with no success, each time getting an automated message stating that the person is "not available" and with no opportunity to leave a voice mail. With patient's permission SW spoke by phone with caregiver Jesusita Carias. Justo Harrison stated that she is a CNA and checks on patient in the morning and the evening and that  are her day off. She will also be off tomorrow (). She stated that patient has a volunteer  who takes her to and from medical appointments. [facility] stated that she is willing to care for patient despite her COVID status but she works 2nd shift and so is not available round the clock. At this time patient does not have anyone to assist her at home if discharged.

## 2023-10-28 NOTE — RESPIRATORY THERAPY NOTE
Home Oxygen Qualifying Test     Patient name: Alejo Smith        : 1935   Date of Test:  2023  Diagnosis:    Home Oxygen Test:    **Medicare Guidelines require item(s) 1-5 on all ambulatory patients or 1 and 2 on non-ambulatory patients. 1. Baseline SPO2 on Room Air at rest 94 %   If <= 88% on Room Air add O2 via NC to obtain SpO2 >=88%. If LPM needed, document LPM  needed to reach =>88%    SPO2 during exertion on Room Air 92 %  During exertion monitor SPO2. If SPO2 increases >=89%, do not add supplemental oxygen    SPO2 on Oxygen at Rest  NA% at NA LPM    SPO2 during exertion on Oxygen NA % at NA LPM    Test performed during exertion activity. []  Supplemental Home Oxygen is indicated. [x]  Client does not qualify for home oxygen. Respiratory Additional Notes Pt walked to bedside commode and back to bed. She's been on roomair and satting fine during this admission.   Lowest SPO2 92% on room air with exertion    Deena Du, RT

## 2023-10-28 NOTE — PROGRESS NOTES
[id number] [facility]  Progress Note  Name: Jensen Monk I  MRN: [id number]  Unit/Bed#: [address] [place] [address]01 I Date of Admission: 10/26/2023   Date of Service: 10/28/2023 I Hospital Day: 1      Assessment & Plan:    * Generalized weakness  Assessment & Plan  Likely multifactorial secondary to COVID infection coupled with progressive deconditioning and chronic history of gout  PT/OT input appreciated with recommendation of skilled rehab -> patient, however, refusing skilled rehab and prefers home health services  Reported some occasional dizziness at various periods throughout the day of admission but currently denies -> CT of head negative for acute intracranial etiology  Home caregiver will not be available until Monday, 10/30 -> tentative date of discharge    COVID-19 virus infection  Assessment & Plan  Oxygenating at baseline room air at rest, however, with attempts at ambulation in the ED, patient became hypoxic with transient resolution once back at rest  Decadron regimen initiated due to transient need for oxygen with exertion  Maintain airborne/contact cautions  Monitor oxygenation  Denies fever/chills or worsening shortness of breath at rest  PRN Robitussin on board  Serial procalcitonins normal -> observe off antibiotics for now  No need for home oxygen based on ambulatory study    Leukocytosis  Assessment & Plan  Likely reactive due to acute medical issue(s)  Monitor WBC count - anticipate fluctuation with corticosteroids    Thrombocytosis  Assessment & Plan  Likely reactive due to acute medical issue(s)  Monitor platelet count    Atrial fibrillation (HCC)  Assessment & Plan  Rate controlled on Coreg  Chronically on Eliquis for anticoagulation  Status post pacemaker    GERD (gastroesophageal reflux disease)  Assessment & Plan  Continue Pepcid    Psychiatric disorder  Assessment & Plan  Continue Zoloft/Risperdal    History of asthma  Assessment & Plan  Stable/asymptomatic  Continue Singulair and PRN Albuterol      DVT Prophylaxis:  Eliquis    Patient Centered Rounds:  I have performed bedside rounds and discussed plan of care with nursing today. Discussions with Specialists or Other Care Team Provider:  see above assessments if applicable    Education and Discussions with Family / Patient:  Patient at bedside - caretaker aware of plan and upcoming discharge    Time Spent for Care:  35 minutes. More than 50% of total time spent on counseling and coordination of care, on one or more of the following: performing physical exam; counseling and coordination of care, obtaining or reviewing history, documenting in the medical record, reviewing/ordering tests/medications/procedures, and communicating with other healthcare professionals. Current Length of Stay: 1 day(s)  Current Patient Status: Inpatient   Certification Statement:  Patient will continue to require additional hospital stay due to assessments as noted above. Code Status: Level 1 - Full Code        Subjective:     Seen and examined this morning. Sitting upright in bed. States her shortness of breath has improved. Objective:     Vitals:   Temp (24hrs), Av.3 °F (36.8 °C), Min:97.8 °F (36.6 °C), Max:98.8 °F (37.1 °C)    Temp:  [97.8 °F (36.6 °C)-98.8 °F (37.1 °C)] 98.8 °F (37.1 °C)  HR:  [59-90] 90  Resp:  [18-20] 18  BP: (114-148)/(56-66) 130/63  SpO2:  [92 %-96 %] 94 %  Body mass index is 39.99 kg/m². Input and Output Summary (last 24 hours):        Intake/Output Summary (Last 24 hours) at 10/28/2023 1140  Last data filed at 10/28/2023 0900  Gross per 24 hour   Intake 820 ml   Output 4100 ml   Net -3280 ml       Physical Exam:     GENERAL Remains weak/fatigued   HEAD   Normocephalic - atraumatic   EYES   PERRL - EOMI    MOUTH   Mucosa moist   NECK   Supple - full range of motion   CARDIAC Rate controlled - S1/S2 positive   PULMONARY Fairly clear to auscultation - nonlabored respirations   ABDOMEN   Soft - nontender/nondistended - active bowel sounds   MUSCULOSKELETAL   Motor strength/range of motion deconditioned   NEUROLOGIC   Alert/oriented at baseline   SKIN   Chronic wrinkles/blemishes    PSYCHIATRIC   Mood/affect pleasant today         Additional Data:     Labs & Recent Cultures:    Results from last 7 days   Lab Units 10/28/23  0555   WBC Thousand/uL 9.22   HEMOGLOBIN g/dL 10.1*   HEMATOCRIT % 32.9*   PLATELETS Thousands/uL 469*   NEUTROS PCT % 73   LYMPHS PCT % 19   MONOS PCT % 8   EOS PCT % 0     Results from last 7 days   Lab Units 10/28/23  0555 10/27/23  0504 10/26/23  1220   POTASSIUM mmol/L 3.7   < > 3.6   CHLORIDE mmol/L 106   < > 102   CO2 mmol/L 27   < > 27   BUN mg/dL 15   < > 10   CREATININE mg/dL 0.58*   < > 0.62   CALCIUM mg/dL 10.2   < > 10.2   ALK PHOS U/L  --   --  63   ALT U/L  --   --  7   AST U/L  --   --  10*    < > = values in this interval not displayed.      Results from last 7 days   Lab Units 10/26/23  1220   INR  1.62*             Results from last 7 days   Lab Units 10/27/23  0504 10/26/23  1220   PROCALCITONIN ng/ml 0.05 0.05                 Lines/Drains:  Invasive Devices       Peripheral Intravenous Line  Duration             Peripheral IV 10/26/23 Left Antecubital 1 day              Drain  Duration             External Urinary Catheter 1 day                      Last 24 Hours Medication List:   Current Facility-Administered Medications   Medication Dose Route Frequency Provider Last Rate    acetaminophen  650 mg Oral Q6H PRN Marissa Koenig MD      albuterol  2 puff Inhalation Q4H PRN Marissa Koenig MD      apixaban  5 mg Oral BID Marissa Koenig MD      bisacodyl  5 mg Oral Daily PRN Marissa Koenig MD      carvedilol  6.25 mg Oral Daily Marissa Koenig MD      dexamethasone  6 mg Intravenous Q24H Marissa Koenig MD      dextromethorphan-guaiFENesin  10 mL Oral Q4H PRN Marissa Koenig MD      docusate sodium  100 mg Oral BID Marissa Koenig MD      famotidine  40 mg Oral Daily Marissa Koenig MD      furosemide  20 mg Oral Early Morning Lucas Coronado MD      montelukast  10 mg Oral HS Lucas Coronado MD      ondansetron  4 mg Intravenous Q4H PRN Lucas Coronado MD      risperiDONE  0.25 mg Oral Daily Lucas Coronado MD      sertraline  25 mg Oral Daily Lucas Coronado MD                  ** Please Note: This note is constructed using a voice recognition dictation system. An occasional wrong word/phrase or “sound-a-like” substitution may have been picked up by dictation device due to the inherent limitations of voice recognition software. Read the chart carefully and recognize, using reasonable context, where substitutions may have occurred. **

## 2023-10-28 NOTE — PLAN OF CARE
Problem: Potential for Falls  Goal: Patient will remain free of falls  Description: INTERVENTIONS:  - Educate patient/family on patient safety including physical limitations  - Instruct patient to call for assistance with activity   - Consult OT/PT to assist with strengthening/mobility   - Keep Call bell within reach  - Keep bed low and locked with side rails adjusted as appropriate  - Keep care items and personal belongings within reach  - Initiate and maintain comfort rounds  - Make Fall Risk Sign visible to staff  - Offer Toileting every 2 Hours, in advance of need  - Initiate/Maintain bed alarm  - Apply yellow socks and bracelet for high fall risk patients  - Consider moving patient to room near nurses station  Outcome: Progressing     Problem: MOBILITY - ADULT  Goal: Maintain or return to baseline ADL function  Description: INTERVENTIONS:  -  Assess patient's ability to carry out ADLs; assess patient's baseline for ADL function and identify physical deficits which impact ability to perform ADLs (bathing, care of mouth/teeth, toileting, grooming, dressing, etc.)  - Assess/evaluate cause of self-care deficits   - Assess range of motion  - Assess patient's mobility; develop plan if impaired  - Assess patient's need for assistive devices and provide as appropriate  - Encourage maximum independence but intervene and supervise when necessary  - Involve family in performance of ADLs  - Assess for home care needs following discharge   - Consider OT consult to assist with ADL evaluation and planning for discharge  - Provide patient education as appropriate  Outcome: Progressing  Goal: Maintains/Returns to pre admission functional level  Description: INTERVENTIONS:  - Perform BMAT or MOVE assessment daily.   - Set and communicate daily mobility goal to care team and patient/family/caregiver. - Collaborate with rehabilitation services on mobility goals if consulted  - Perform Range of Motion 2 times a day.   - Reposition patient every 2 hours.   - Dangle patient 2 times a day  - Stand patient 2 times a day  - Ambulate patient 2 times a day  - Out of bed to chair 2 times a day   - Out of bed for meals 2 times a day  - Out of bed for toileting  - Record patient progress and toleration of activity level   Outcome: Progressing     Problem: PAIN - ADULT  Goal: Verbalizes/displays adequate comfort level or baseline comfort level  Description: Interventions:  - Encourage patient to monitor pain and request assistance  - Assess pain using appropriate pain scale  - Administer analgesics based on type and severity of pain and evaluate response  - Implement non-pharmacological measures as appropriate and evaluate response  - Consider cultural and social influences on pain and pain management  - Notify physician/advanced practitioner if interventions unsuccessful or patient reports new pain  Outcome: Progressing     Problem: INFECTION - ADULT  Goal: Absence or prevention of progression during hospitalization  Description: INTERVENTIONS:  - Assess and monitor for signs and symptoms of infection  - Monitor lab/diagnostic results  - Monitor all insertion sites, i.e. indwelling lines, tubes, and drains  - Monitor endotracheal if appropriate and nasal secretions for changes in amount and color  - [place] appropriate cooling/warming therapies per order  - Administer medications as ordered  - Instruct and encourage patient and family to use good hand hygiene technique  - Identify and instruct in appropriate isolation precautions for identified infection/condition  Outcome: Progressing     Problem: DISCHARGE PLANNING  Goal: Discharge to home or other facility with appropriate resources  Description: INTERVENTIONS:  - Identify barriers to discharge w/patient and caregiver  - Arrange for needed discharge resources and transportation as appropriate  - Identify discharge learning needs (meds, wound care, etc.)  - Arrange for interpretive services to assist at discharge as needed  - Refer to Case Management Department for coordinating discharge planning if the patient needs post-hospital services based on physician/advanced practitioner order or complex needs related to functional status, cognitive ability, or social support system  Outcome: Progressing     Problem: Knowledge Deficit  Goal: Patient/family/caregiver demonstrates understanding of disease process, treatment plan, medications, and discharge instructions  Description: Complete learning assessment and assess knowledge base.   Interventions:  - Provide teaching at level of understanding  - Provide teaching via preferred learning methods  Outcome: Progressing     Problem: Prexisting or High Potential for Compromised Skin Integrity  Goal: Skin integrity is maintained or improved  Description: INTERVENTIONS:  - Identify patients at risk for skin breakdown  - Assess and monitor skin integrity  - Assess and monitor nutrition and hydration status  - Monitor labs   - Assess for incontinence   - Turn and reposition patient  - Assist with mobility/ambulation  - Relieve pressure over bony prominences  - Avoid friction and shearing  - Provide appropriate hygiene as needed including keeping skin clean and dry  - Evaluate need for skin moisturizer/barrier cream  - Collaborate with interdisciplinary team   - Patient/family teaching  - Consider wound care consult   Outcome: Progressing

## 2023-10-29 LAB
ANION GAP SERPL CALCULATED.3IONS-SCNC: 5 MMOL/L
BASOPHILS # BLD AUTO: 0.02 THOUSANDS/ÂΜL (ref 0–0.1)
BASOPHILS NFR BLD AUTO: 0 % (ref 0–1)
BUN SERPL-MCNC: 20 MG/DL (ref 5–25)
CALCIUM SERPL-MCNC: 10.2 MG/DL (ref 8.4–10.2)
CHLORIDE SERPL-SCNC: 106 MMOL/L (ref 96–108)
CO2 SERPL-SCNC: 28 MMOL/L (ref 21–32)
CREAT SERPL-MCNC: 0.67 MG/DL (ref 0.6–1.3)
EOSINOPHIL # BLD AUTO: 0.02 THOUSAND/ÂΜL (ref 0–0.61)
EOSINOPHIL NFR BLD AUTO: 0 % (ref 0–6)
ERYTHROCYTE [DISTWIDTH] IN BLOOD BY AUTOMATED COUNT: 14.6 % (ref 11.6–15.1)
GFR SERPL CREATININE-BSD FRML MDRD: 79 ML/MIN/1.73SQ M
GLUCOSE SERPL-MCNC: 111 MG/DL (ref 65–140)
HCT VFR BLD AUTO: 33.3 % (ref 34.8–46.1)
HGB BLD-MCNC: 10.4 G/DL (ref 11.5–15.4)
IMM GRANULOCYTES # BLD AUTO: 0.04 THOUSAND/UL (ref 0–0.2)
IMM GRANULOCYTES NFR BLD AUTO: 1 % (ref 0–2)
LYMPHOCYTES # BLD AUTO: 1.61 THOUSANDS/ÂΜL (ref 0.6–4.47)
LYMPHOCYTES NFR BLD AUTO: 19 % (ref 14–44)
MCH RBC QN AUTO: 27.4 PG (ref 26.8–34.3)
MCHC RBC AUTO-ENTMCNC: 31.2 G/DL (ref 31.4–37.4)
MCV RBC AUTO: 88 FL (ref 82–98)
MONOCYTES # BLD AUTO: 0.62 THOUSAND/ÂΜL (ref 0.17–1.22)
MONOCYTES NFR BLD AUTO: 7 % (ref 4–12)
NEUTROPHILS # BLD AUTO: 6.11 THOUSANDS/ÂΜL (ref 1.85–7.62)
NEUTS SEG NFR BLD AUTO: 73 % (ref 43–75)
NRBC BLD AUTO-RTO: 0 /100 WBCS
PLATELET # BLD AUTO: 489 THOUSANDS/UL (ref 149–390)
PMV BLD AUTO: 9.6 FL (ref 8.9–12.7)
POTASSIUM SERPL-SCNC: 3.8 MMOL/L (ref 3.5–5.3)
RBC # BLD AUTO: 3.8 MILLION/UL (ref 3.81–5.12)
SODIUM SERPL-SCNC: 139 MMOL/L (ref 135–147)
WBC # BLD AUTO: 8.42 THOUSAND/UL (ref 4.31–10.16)

## 2023-10-29 PROCEDURE — 97110 THERAPEUTIC EXERCISES: CPT

## 2023-10-29 PROCEDURE — 97530 THERAPEUTIC ACTIVITIES: CPT

## 2023-10-29 PROCEDURE — 85025 COMPLETE CBC W/AUTO DIFF WBC: CPT | Performed by: INTERNAL MEDICINE

## 2023-10-29 PROCEDURE — 99232 SBSQ HOSP IP/OBS MODERATE 35: CPT | Performed by: INTERNAL MEDICINE

## 2023-10-29 PROCEDURE — 80048 BASIC METABOLIC PNL TOTAL CA: CPT | Performed by: INTERNAL MEDICINE

## 2023-10-29 RX ADMIN — CARVEDILOL 6.25 MG: 6.25 TABLET, FILM COATED ORAL at 10:02

## 2023-10-29 RX ADMIN — DOCUSATE SODIUM 100 MG: 100 CAPSULE, LIQUID FILLED ORAL at 17:28

## 2023-10-29 RX ADMIN — APIXABAN 5 MG: 5 TABLET, FILM COATED ORAL at 21:26

## 2023-10-29 RX ADMIN — RISPERIDONE 0.25 MG: 0.25 TABLET, FILM COATED ORAL at 10:02

## 2023-10-29 RX ADMIN — DEXAMETHASONE SODIUM PHOSPHATE 6 MG: 4 INJECTION INTRA-ARTICULAR; INTRALESIONAL; INTRAMUSCULAR; INTRAVENOUS; SOFT TISSUE at 15:10

## 2023-10-29 RX ADMIN — SERTRALINE 25 MG: 25 TABLET, FILM COATED ORAL at 10:02

## 2023-10-29 RX ADMIN — DOCUSATE SODIUM 100 MG: 100 CAPSULE, LIQUID FILLED ORAL at 10:02

## 2023-10-29 RX ADMIN — APIXABAN 5 MG: 5 TABLET, FILM COATED ORAL at 10:02

## 2023-10-29 RX ADMIN — FAMOTIDINE 40 MG: 20 TABLET ORAL at 10:02

## 2023-10-29 RX ADMIN — FUROSEMIDE 20 MG: 20 TABLET ORAL at 05:30

## 2023-10-29 RX ADMIN — MONTELUKAST 10 MG: 10 TABLET, FILM COATED ORAL at 21:26

## 2023-10-29 NOTE — PLAN OF CARE
Problem: Potential for Falls  Goal: Patient will remain free of falls  Description: INTERVENTIONS:  - Educate patient/family on patient safety including physical limitations  - Instruct patient to call for assistance with activity   - Consult OT/PT to assist with strengthening/mobility   - Keep Call bell within reach  - Keep bed low and locked with side rails adjusted as appropriate  - Keep care items and personal belongings within reach  - Initiate and maintain comfort rounds  - Make Fall Risk Sign visible to staff  - Offer Toileting every 2 Hours, in advance of need  - Initiate/Maintain bed alarm  - Apply yellow socks and bracelet for high fall risk patients  - Consider moving patient to room near nurses station  Outcome: Progressing     Problem: MOBILITY - ADULT  Goal: Maintain or return to baseline ADL function  Description: INTERVENTIONS:  -  Assess patient's ability to carry out ADLs; assess patient's baseline for ADL function and identify physical deficits which impact ability to perform ADLs (bathing, care of mouth/teeth, toileting, grooming, dressing, etc.)  - Assess/evaluate cause of self-care deficits   - Assess range of motion  - Assess patient's mobility; develop plan if impaired  - Assess patient's need for assistive devices and provide as appropriate  - Encourage maximum independence but intervene and supervise when necessary  - Involve family in performance of ADLs  - Assess for home care needs following discharge   - Consider OT consult to assist with ADL evaluation and planning for discharge  - Provide patient education as appropriate  Outcome: Progressing     Problem: DISCHARGE PLANNING  Goal: Discharge to home or other facility with appropriate resources  Description: INTERVENTIONS:  - Identify barriers to discharge w/patient and caregiver  - Arrange for needed discharge resources and transportation as appropriate  - Identify discharge learning needs (meds, wound care, etc.)  - Refer to Case Management Department for coordinating discharge planning if the patient needs post-hospital services based on physician/advanced practitioner order or complex needs related to functional status, cognitive ability, or social support system  Outcome: Progressing

## 2023-10-29 NOTE — PHYSICAL THERAPY NOTE
PT TREATMENT       10/29/23 1359   PT Last Visit   PT Visit Date 10/29/23   Note Type   Note Type Treatment   Pain Assessment   Pain Assessment Tool 0-10   Pain Score No Pain   Patient's Stated Pain Goal No pain   Hospital Pain Intervention(s) Repositioned; Ambulation/increased activity   Multiple Pain Sites No   Restrictions/Precautions   Weight Bearing Precautions Per Order No   Other Precautions Bed Alarm; Chair Alarm; Fall Risk;Pain   General   Chart Reviewed Yes   Family/Caregiver Present No   Cognition   Overall Cognitive Status WFL   Arousal/Participation Cooperative   Attention Within functional limits   Orientation Level Oriented X4   Following Commands Follows multistep commands with increased time or repetition   Subjective   Subjective "Oh good you came back"   Bed Mobility   Supine to Sit 4  Minimal assistance   Additional items Assist x 1;Verbal cues; Bedrails; Increased time required   Sit to Supine 4  Minimal assistance   Additional items Assist x 1;Verbal cues; Increased time required; Bedrails;LE management   Transfers   Sit to Stand 4  Minimal assistance   Additional items Assist x 1;Verbal cues; Increased time required; Bedrails   Stand to Sit 4  Minimal assistance   Additional items Assist x 1;Verbal cues; Increased time required; Bedrails;Armrests   Ambulation/Elevation   Gait pattern Foward flexed; Short stride; Step to   Gait Assistance 4  Minimal assist   Additional items Assist x 1;Verbal cues   Assistive Device Rolling walker   Distance 10 feet + 15 feet with change of direction   Stair Management Assistance Not tested   Balance   Static Sitting Fair +   Dynamic Sitting Fair   Static Standing Fair   Dynamic Standing Fair -   Ambulatory Fair -   Activity Tolerance   Activity Tolerance Patient tolerated treatment well;Patient limited by fatigue   Medical Staff Made Aware yes DEBI Erickson   Exercises   Neuro re-ed Able to perform supine/seated exercise including ankle pumps, quad/glute sets, heel slides, LAQ, seated hip marches, hip add squeezes x 10-20 reps bilat   Assessment   Prognosis Good   Problem List Decreased strength;Decreased range of motion;Decreased endurance; Impaired balance;Decreased mobility;Pain;Obesity   Assessment Pt seen for PT session this afternoon. Able to perform exercise as mentioned above with intermittent assist/demo to improve form/ROM. Pt initially unsteady with posterior lean in standing - able to perform standing marches x 6-7 reps bilat with improved postural awareness. able to ambulate x 2 trials with intermittent seated rest break due to fatigue. Requesting to return to bed + repositioned for comfort with all needs within reach. The patient's AM-PAC Basic Mobility Inpatient Short Form Raw Score is 16. A Raw score of less than or equal to 16 suggests the patient may benefit from discharge to post-acute rehabilitation services. Please also refer to the recommendation of the Physical Therapist for safe discharge planning. Goals   Patient Goals to get better   Plan   Treatment/Interventions ADL retraining;Functional transfer training;LE strengthening/ROM; Therapeutic exercise; Endurance training;Bed mobility;Gait training;Spoke to nursing;OT   Progress Progressing toward goals   PT Frequency Other (Comment)  (5x/week)   Discharge Recommendation   PT Discharge Recommendation Post acute rehabilitation services   AM-PAC Basic Mobility Inpatient   Turning in Flat Bed Without Bedrails 3   Lying on Back to Sitting on Edge of Flat Bed Without Bedrails 3   Moving Bed to Chair 3   Standing Up From Chair Using Arms 3   Walk in Room 3   Climb 3-5 Stairs With Railing 1   Basic Mobility Inpatient Raw Score 16   Basic Mobility Standardized Score 38.32   Highest Level Of Mobility   JH-HLM Goal 5: Stand one or more mins   JH-HLM Achieved 7: Walk 25 feet or more   End of Consult   Patient Position at End of Consult Supine;Bed/Chair alarm activated; All needs within reach   [facility] Number  Dipesh Bradshaw [id number]

## 2023-10-29 NOTE — PLAN OF CARE
Problem: PHYSICAL THERAPY ADULT  Goal: Performs mobility at highest level of function for planned discharge setting. See evaluation for individualized goals. Description: Treatment/Interventions: ADL retraining, Functional transfer training, LE strengthening/ROM, Therapeutic exercise, Endurance training, Bed mobility, Gait training, Spoke to nursing, OT          See flowsheet documentation for full assessment, interventions and recommendations. Outcome: Progressing  Note: Prognosis: Good  Problem List: Decreased strength, Decreased range of motion, Decreased endurance, Impaired balance, Decreased mobility, Pain, Obesity  Assessment: Pt seen for PT session this afternoon. Able to perform exercise as mentioned above with intermittent assist/demo to improve form/ROM. Pt initially unsteady with posterior lean in standing - able to perform standing marches x 6-7 reps bilat with improved postural awareness. able to ambulate x 2 trials with intermittent seated rest break due to fatigue. Requesting to return to bed + repositioned for comfort with all needs within reach. PT Discharge Recommendation: Post acute rehabilitation services    See flowsheet documentation for full assessment.

## 2023-10-29 NOTE — PROGRESS NOTES
[id number] [facility]  Progress Note  Name: Danielle Arteaga I  MRN: [id number]  Unit/Bed#: 3 [place] 314-01 I Date of Admission: 10/26/2023   Date of Service: 10/29/2023 I Hospital Day: 2      Assessment & Plan:    * Generalized weakness  Assessment & Plan  Likely multifactorial secondary to COVID infection coupled with progressive deconditioning and chronic history of gout  PT/OT input appreciated with recommendation of skilled rehab -> patient, however, refusing skilled rehab and prefers home health services  Reported some occasional dizziness at various periods throughout the day of admission but currently denies -> CT of head negative for acute intracranial etiology  Home caregiver will not be available until tomorrow, 10/30 -> tentative date of discharge    COVID-19 virus infection  Assessment & Plan  Oxygenating at baseline room air at rest, however, with attempts at ambulation in the ED, patient became hypoxic with transient resolution once back at rest  Decadron regimen initiated due to transient need for oxygen with exertion  Maintain airborne/contact cautions  Monitor oxygenation  Denies fever/chills or worsening shortness of breath at rest  PRN Robitussin on board  Serial procalcitonins normal -> observe off antibiotics for now  No need for home oxygen based on ambulatory study    Leukocytosis  Assessment & Plan  Likely reactive due to acute medical issue(s)  Monitor WBC count - anticipate fluctuation with corticosteroids    Thrombocytosis  Assessment & Plan  Likely reactive due to acute medical issue(s)  Monitor platelet count    Atrial fibrillation ([address])  Assessment & Plan  Rate controlled on Coreg  Chronically on Eliquis for anticoagulation  Status post pacemaker    GERD (gastroesophageal reflux disease)  Assessment & Plan  Continue Pepcid    Psychiatric disorder  Assessment & Plan  Continue Zoloft/Risperdal    History of asthma  Assessment & Plan  Stable/asymptomatic  Continue Singulair and PRN Albuterol      DVT Prophylaxis:  Eliquis    Patient Centered Rounds:  I have performed bedside rounds and discussed plan of care with nursing today. Discussions with Specialists or Other Care Team Provider:  see above assessments if applicable    Education and Discussions with Family / Patient:  Patient at bedside    Time Spent for Care:  35 minutes. More than 50% of total time spent on counseling and coordination of care, on one or more of the following: performing physical exam; counseling and coordination of care, obtaining or reviewing history, documenting in the medical record, reviewing/ordering tests/medications/procedures, and communicating with other healthcare professionals. Current Length of Stay: 2 day(s)  Current Patient Status: Inpatient   Certification Statement:  Patient will continue to require additional hospital stay due to assessments as noted above. Code Status: Level 1 - Full Code        Subjective:     Seen and examined this morning. Denies chest pain or worsening shortness of breath at this time. Objective:     Vitals:   Temp (24hrs), Av.6 °F (37 °C), Min:98.4 °F (36.9 °C), Max:98.8 °F (37.1 °C)    Temp:  [98.4 °F (36.9 °C)-98.8 °F (37.1 °C)] 98.4 °F (36.9 °C)  HR:  [60] 60  Resp:  [18-20] 18  BP: (105-133)/(50-63) 133/63  SpO2:  [92 %-93 %] 92 %  Body mass index is 39.99 kg/m². Input and Output Summary (last 24 hours):        Intake/Output Summary (Last 24 hours) at 10/29/2023 1117  Last data filed at 10/29/2023 0537  Gross per 24 hour   Intake 300 ml   Output 950 ml   Net -650 ml       Physical Exam:     GENERAL Waxing/waning weakness/fatigue   HEAD   Normocephalic - atraumatic   EYES   PERRL - EOMI    MOUTH   Mucosa moist   NECK   Supple - full range of motion   CARDIAC Rate controlled - S1/S2 positive   PULMONARY Fairly clear to auscultation - nonlabored respirations   ABDOMEN   Soft - nontender/nondistended - active bowel sounds   MUSCULOSKELETAL   Motor strength/range of motion remains deconditioned   NEUROLOGIC   Alert/oriented at baseline   SKIN   Chronic wrinkles/blemishes    PSYCHIATRIC   Mood/affect stable         Additional Data:     Labs & Recent Cultures:    Results from last 7 days   Lab Units 10/29/23  0521   WBC Thousand/uL 8.42   HEMOGLOBIN g/dL 10.4*   HEMATOCRIT % 33.3*   PLATELETS Thousands/uL 489*   NEUTROS PCT % 73   LYMPHS PCT % 19   MONOS PCT % 7   EOS PCT % 0     Results from last 7 days   Lab Units 10/29/23  0521 10/27/23  0504 10/26/23  1220   POTASSIUM mmol/L 3.8   < > 3.6   CHLORIDE mmol/L 106   < > 102   CO2 mmol/L 28   < > 27   BUN mg/dL 20   < > 10   CREATININE mg/dL 0.67   < > 0.62   CALCIUM mg/dL 10.2   < > 10.2   ALK PHOS U/L  --   --  63   ALT U/L  --   --  7   AST U/L  --   --  10*    < > = values in this interval not displayed.      Results from last 7 days   Lab Units 10/26/23  1220   INR  1.62*             Results from last 7 days   Lab Units 10/27/23  0504 10/26/23  1220   PROCALCITONIN ng/ml 0.05 0.05                 Lines/Drains:  Invasive Devices       Peripheral Intravenous Line  Duration             Peripheral IV 10/26/23 Left Antecubital 2 days              Drain  Duration             External Urinary Catheter 2 days                      Last 24 Hours Medication List:   Current Facility-Administered Medications   Medication Dose Route Frequency Provider Last Rate    acetaminophen  650 mg Oral Q6H PRN Chivo Pepper MD      albuterol  2 puff Inhalation Q4H PRN Chivo Pepper MD      apixaban  5 mg Oral BID Chivo Pepper MD      bisacodyl  5 mg Oral Daily PRN Chivo Pepper MD      carvedilol  6.25 mg Oral Daily Chivo Pepper MD      dexamethasone  6 mg Intravenous Q24H Chivo Pepper MD      dextromethorphan-guaiFENesin  10 mL Oral Q4H PRN Chivo Pepper MD      docusate sodium  100 mg Oral BID Chivo Pepper MD      famotidine  40 mg Oral Daily Chivo Pepper MD      furosemide  20 mg Oral Early Morning Chivo Pepper MD montelukast  10 mg Oral HS Wilton Costa MD      ondansetron  4 mg Intravenous Q4H PRN Wilton Costa MD      risperiDONE  0.25 mg Oral Daily Wilton Costa MD      sertraline  25 mg Oral Daily Wilton Costa MD                  ** Please Note: This note is constructed using a voice recognition dictation system. An occasional wrong word/phrase or “sound-a-like” substitution may have been picked up by dictation device due to the inherent limitations of voice recognition software. Read the chart carefully and recognize, using reasonable context, where substitutions may have occurred. **

## 2023-10-30 VITALS
HEART RATE: 60 BPM | OXYGEN SATURATION: 94 % | TEMPERATURE: 97.5 F | HEIGHT: 64 IN | DIASTOLIC BLOOD PRESSURE: 66 MMHG | SYSTOLIC BLOOD PRESSURE: 137 MMHG | BODY MASS INDEX: 39.99 KG/M2 | RESPIRATION RATE: 18 BRPM

## 2023-10-30 LAB
ANION GAP SERPL CALCULATED.3IONS-SCNC: 6 MMOL/L
BASOPHILS # BLD AUTO: 0.02 THOUSANDS/ÂΜL (ref 0–0.1)
BASOPHILS NFR BLD AUTO: 0 % (ref 0–1)
BUN SERPL-MCNC: 18 MG/DL (ref 5–25)
CALCIUM SERPL-MCNC: 10.1 MG/DL (ref 8.4–10.2)
CHLORIDE SERPL-SCNC: 105 MMOL/L (ref 96–108)
CO2 SERPL-SCNC: 26 MMOL/L (ref 21–32)
CREAT SERPL-MCNC: 0.54 MG/DL (ref 0.6–1.3)
EOSINOPHIL # BLD AUTO: 0.05 THOUSAND/ÂΜL (ref 0–0.61)
EOSINOPHIL NFR BLD AUTO: 1 % (ref 0–6)
ERYTHROCYTE [DISTWIDTH] IN BLOOD BY AUTOMATED COUNT: 14.4 % (ref 11.6–15.1)
GFR SERPL CREATININE-BSD FRML MDRD: 85 ML/MIN/1.73SQ M
GLUCOSE SERPL-MCNC: 97 MG/DL (ref 65–140)
HCT VFR BLD AUTO: 34.4 % (ref 34.8–46.1)
HGB BLD-MCNC: 10.9 G/DL (ref 11.5–15.4)
IMM GRANULOCYTES # BLD AUTO: 0.05 THOUSAND/UL (ref 0–0.2)
IMM GRANULOCYTES NFR BLD AUTO: 1 % (ref 0–2)
LYMPHOCYTES # BLD AUTO: 1.9 THOUSANDS/ÂΜL (ref 0.6–4.47)
LYMPHOCYTES NFR BLD AUTO: 19 % (ref 14–44)
MCH RBC QN AUTO: 27.7 PG (ref 26.8–34.3)
MCHC RBC AUTO-ENTMCNC: 31.7 G/DL (ref 31.4–37.4)
MCV RBC AUTO: 87 FL (ref 82–98)
MONOCYTES # BLD AUTO: 0.65 THOUSAND/ÂΜL (ref 0.17–1.22)
MONOCYTES NFR BLD AUTO: 7 % (ref 4–12)
NEUTROPHILS # BLD AUTO: 7.29 THOUSANDS/ÂΜL (ref 1.85–7.62)
NEUTS SEG NFR BLD AUTO: 72 % (ref 43–75)
NRBC BLD AUTO-RTO: 0 /100 WBCS
PLATELET # BLD AUTO: 465 THOUSANDS/UL (ref 149–390)
PMV BLD AUTO: 9.3 FL (ref 8.9–12.7)
POTASSIUM SERPL-SCNC: 3.7 MMOL/L (ref 3.5–5.3)
RBC # BLD AUTO: 3.94 MILLION/UL (ref 3.81–5.12)
SODIUM SERPL-SCNC: 137 MMOL/L (ref 135–147)
WBC # BLD AUTO: 9.96 THOUSAND/UL (ref 4.31–10.16)

## 2023-10-30 PROCEDURE — 80048 BASIC METABOLIC PNL TOTAL CA: CPT | Performed by: INTERNAL MEDICINE

## 2023-10-30 PROCEDURE — 97535 SELF CARE MNGMENT TRAINING: CPT

## 2023-10-30 PROCEDURE — 85025 COMPLETE CBC W/AUTO DIFF WBC: CPT | Performed by: INTERNAL MEDICINE

## 2023-10-30 PROCEDURE — 99239 HOSP IP/OBS DSCHRG MGMT >30: CPT | Performed by: INTERNAL MEDICINE

## 2023-10-30 RX ORDER — GUAIFENESIN/DEXTROMETHORPHAN 100-10MG/5
10 SYRUP ORAL EVERY 4 HOURS PRN
Refills: 0
Start: 2023-10-30

## 2023-10-30 RX ADMIN — DOCUSATE SODIUM 100 MG: 100 CAPSULE, LIQUID FILLED ORAL at 09:30

## 2023-10-30 RX ADMIN — FAMOTIDINE 40 MG: 20 TABLET ORAL at 09:29

## 2023-10-30 RX ADMIN — FUROSEMIDE 20 MG: 20 TABLET ORAL at 05:10

## 2023-10-30 RX ADMIN — DOCUSATE SODIUM 100 MG: 100 CAPSULE, LIQUID FILLED ORAL at 17:45

## 2023-10-30 RX ADMIN — CARVEDILOL 6.25 MG: 6.25 TABLET, FILM COATED ORAL at 09:29

## 2023-10-30 RX ADMIN — DEXAMETHASONE SODIUM PHOSPHATE 6 MG: 4 INJECTION INTRA-ARTICULAR; INTRALESIONAL; INTRAMUSCULAR; INTRAVENOUS; SOFT TISSUE at 15:03

## 2023-10-30 RX ADMIN — RISPERIDONE 0.25 MG: 0.25 TABLET, FILM COATED ORAL at 09:29

## 2023-10-30 RX ADMIN — SERTRALINE 25 MG: 25 TABLET, FILM COATED ORAL at 09:29

## 2023-10-30 RX ADMIN — APIXABAN 5 MG: 5 TABLET, FILM COATED ORAL at 09:29

## 2023-10-30 NOTE — PLAN OF CARE
Problem: OCCUPATIONAL THERAPY ADULT  Goal: Performs self-care activities at highest level of function for planned discharge setting. See evaluation for individualized goals. Description: Treatment Interventions: ADL retraining, Functional transfer training, UE strengthening/ROM, Endurance training, Equipment evaluation/education, Patient/family training, Activityengagement, Energy conservation, Compensatory technique education          See flowsheet documentation for full assessment, interventions and recommendations. Outcome: Progressing  Note: Limitation: Decreased ADL status, Decreased UE strength, Decreased Safe judgement during ADL, Decreased UE ROM, Decreased endurance, Decreased high-level ADLs, Decreased self-care trans (decreased balance and mobility)  Prognosis: Good  Assessment: Pt seen for skilled OT tx session focusing on activity engagement and challenging activity tolerance to maximize functional independence. Pt agreeable and motivated to participate. Pt demonstrated improved activity tolerance and required less physical assistance. Pt completed bed mobility w/ min A. Pt required min A to stand and min A using RW for functional mobility w/ + time. Pt engaged in grooming w/ S seated after set- up. O2 sats >89% on room air throughout session.  Will continue to follow     Rehab Resource Intensity Level, OT: II (Moderate Resource Intensity)

## 2023-10-30 NOTE — PHYSICAL THERAPY NOTE
PHYSICAL THERAPY     10/30/23 1030   Note Type   Note Type Cancelled Session   Cancel Reasons Other  (patient with nursing care/phone conversation re:discharge.  will re-attempt at a later time as appropriate)   Licensure   [place] License Number  Nella Day PT [id number]

## 2023-10-30 NOTE — DISCHARGE SUMMARY
Discharge Summary - [facility] Internal Medicine  Patient: Meggan Monterroso 81 y.o. female   MRN: [id number]  PCP: Jasen Yepez DO  Unit/Bed#: 913 [facility] 407-01 Encounter: [id number]            Discharging Physician / Practitioner: Lele Grant MD  PCP: Jasen Yepez DO  Admission Date:   Admission Orders (From admission, onward)       Ordered        10/27/23 1553  Inpatient Admission  Once            10/26/23 1538  Place in Observation  Once                          Discharge Date: 10/30/23      Reason for Admission:  Weakness      Discharge Diagnoses:     Principal Problem:    Generalized weakness    Active Problems:    COVID-19 virus infection    Leukocytosis    Thrombocytosis    Atrial fibrillation ([address])    GERD (gastroesophageal reflux disease)    Psychiatric disorder    History of asthma      Consultations During Hospital Stay:  None      Hospital Course:     Meggan Monterroso is a 81 y.o. female patient who originally presented to the hospital on 10/26/2023 due to generalized weakness and transient hypoxia with exertion. Please see progress note from earlier today for further details. Patient was awaiting skilled rehab placement, and later this evening, the medical team was informed by case management that a facility has accepted the patient and transport has been set up for discharge this evening. Condition at Discharge: fair       Discharge Day Visit / Exam:     Vitals:  Blood Pressure: 137/66 (10/30/23 1515)  Pulse: 60 (10/30/23 1456)  Temperature: 97.5 °F (36.4 °C) (10/30/23 1515)  Temp Source: Oral (10/28/23 2330)  Respirations: 18 (10/30/23 0811)  Height: 5' 4" (162.6 cm) (10/26/23 2300)  SpO2: 94 % (10/30/23 1456)      Physical exam:  I had a face-to-face encounter with the patient on day of discharge. Discussion with Patient and/or Family:  The patient has been advised to return to the ER immediately if any symptoms recur or worsen.        Discharge instructions/Information to Patient and/or Family:   See after visit summary for information provided to patient and/or family. Provisions for Follow-Up Care:  See after visit summary for information related to follow-up care and any pertinent home health orders. Disposition:   Skilled rehab facility        Discharge Medications:  See after visit summary for reconciled discharge medications provided to patient and/or family. Discharge Statement:  I spent 38 minutes discharging the patient. This time was spent on the day of discharge. I had direct contact with the patient on the day of discharge. Greater than 50% of the total time was spent examining patient, answering all patient questions, arranging and discussing plan of care with patient as well as directly providing post-discharge instructions. Additional time then spent on discharge activities. ** Please Note: This note is constructed using a voice recognition dictation system. An occasional wrong word/phrase or “sound-a-like” substitution may have been picked up by dictation device due to the inherent limitations of voice recognition software. Read the chart carefully and recognize, using reasonable context, where substitutions may have occurred. **

## 2023-10-30 NOTE — PROGRESS NOTES
[id number] [facility]  Progress Note  Name: Shelley Schneider I  MRN: [id number]  Unit/Bed#: 913 [facility] 407-01 I Date of Admission: 10/26/2023   Date of Service: 10/30/2023 I Hospital Day: 3      Assessment & Plan:    * Generalized weakness  Assessment & Plan  Likely multifactorial secondary to COVID infection coupled with progressive deconditioning and chronic history of gout  PT/OT input appreciated with recommendation of skilled rehab -> patient, however, refusing skilled rehab and prefers home health services  Reported some occasional dizziness at various periods throughout the day of admission but currently denies -> CT of head negative for acute intracranial etiology  Initial plan was for discharge today with caretaker, however, patient now agreeable to skilled rehab placement -> case management assistance to be appreciated    COVID-19 virus infection  Assessment & Plan  Oxygenating at baseline room air at rest, however, with attempts at ambulation in the ED, patient became hypoxic with transient resolution once back at rest  Decadron regimen initiated due to transient need for oxygen with exertion  Maintain airborne/contact cautions  Monitor oxygenation  Denies fever/chills or worsening shortness of breath at rest  PRN Robitussin on board  Serial procalcitonins normal -> observe off antibiotics for now  No need for home oxygen based on ambulatory study    Leukocytosis  Assessment & Plan  Likely reactive due to acute medical issue(s)  Monitor WBC count - anticipate fluctuation with corticosteroids    Thrombocytosis  Assessment & Plan  Likely reactive due to acute medical issue(s)  Monitor platelet count    Atrial fibrillation ([address])  Assessment & Plan  Rate controlled on Coreg  Chronically on Eliquis for anticoagulation  Status post pacemaker    GERD (gastroesophageal reflux disease)  Assessment & Plan  Continue Pepcid    Psychiatric disorder  Assessment & Plan  Continue Zoloft/Risperdal    History of asthma  Assessment & Plan  Stable/asymptomatic  Continue Singulair and PRN Albuterol      DVT Prophylaxis:  Eliquis    Patient Centered Rounds:  I have performed bedside rounds and discussed plan of care with nursing today. Discussions with Specialists or Other Care Team Provider:  see above assessments if applicable    Education and Discussions with Family / Patient:  Patient at bedside    Time Spent for Care:  35 minutes. More than 50% of total time spent on counseling and coordination of care, on one or more of the following: performing physical exam; counseling and coordination of care, obtaining or reviewing history, documenting in the medical record, reviewing/ordering tests/medications/procedures, and communicating with other healthcare professionals. Current Length of Stay: 3 day(s)  Current Patient Status: Inpatient   Certification Statement:  Patient will continue to require additional hospital stay due to assessments as noted above. Code Status: Level 1 - Full Code        Subjective:     Encountered earlier in the day. No new complaints at this time. Denies worsening shortness of breath. Objective:     Vitals:   Temp (24hrs), Av.9 °F (36.6 °C), Min:97.5 °F (36.4 °C), Max:98.2 °F (36.8 °C)    Temp:  [97.5 °F (36.4 °C)-98.2 °F (36.8 °C)] 97.5 °F (36.4 °C)  HR:  [60] 60  Resp:  [16-18] 18  BP: (123-136)/(56-63) 136/63  SpO2:  [92 %-94 %] 93 %  Body mass index is 39.99 kg/m². Input and Output Summary (last 24 hours):        Intake/Output Summary (Last 24 hours) at 10/30/2023 1453  Last data filed at 10/30/2023 0601  Gross per 24 hour   Intake --   Output 950 ml   Net -950 ml       Physical Exam:     GENERAL Waxing/waning weakness/fatigue   HEAD   Normocephalic - atraumatic   EYES   PERRL - EOMI    MOUTH   Mucosa moist   NECK   Supple - full range of motion   CARDIAC Rate controlled - S1/S2 positive   PULMONARY Clear bilateral breath sounds - nonlabored respirations at rest   ABDOMEN Soft - nontender/nondistended - active bowel sounds   MUSCULOSKELETAL   Motor strength/range of motion remains deconditioned   NEUROLOGIC   Alert/oriented at baseline   SKIN   Chronic wrinkles/blemishes    PSYCHIATRIC   Mood/affect stable            Additional Data:     Labs & Recent Cultures:    Results from last 7 days   Lab Units 10/30/23  0525   WBC Thousand/uL 9.96   HEMOGLOBIN g/dL 10.9*   HEMATOCRIT % 34.4*   PLATELETS Thousands/uL 465*   NEUTROS PCT % 72   LYMPHS PCT % 19   MONOS PCT % 7   EOS PCT % 1     Results from last 7 days   Lab Units 10/30/23  0525 10/27/23  0504 10/26/23  1220   POTASSIUM mmol/L 3.7   < > 3.6   CHLORIDE mmol/L 105   < > 102   CO2 mmol/L 26   < > 27   BUN mg/dL 18   < > 10   CREATININE mg/dL 0.54*   < > 0.62   CALCIUM mg/dL 10.1   < > 10.2   ALK PHOS U/L  --   --  63   ALT U/L  --   --  7   AST U/L  --   --  10*    < > = values in this interval not displayed.      Results from last 7 days   Lab Units 10/26/23  1220   INR  1.62*             Results from last 7 days   Lab Units 10/27/23  0504 10/26/23  1220   PROCALCITONIN ng/ml 0.05 0.05                 Lines/Drains:  Invasive Devices       Peripheral Intravenous Line  Duration             Peripheral IV 10/26/23 Left Antecubital 4 days              Drain  Duration             External Urinary Catheter 3 days                      Last 24 Hours Medication List:   Current Facility-Administered Medications   Medication Dose Route Frequency Provider Last Rate    acetaminophen  650 mg Oral Q6H PRN Linda Junior MD      albuterol  2 puff Inhalation Q4H PRN Linda Junior MD      apixaban  5 mg Oral BID Linda Junior MD      bisacodyl  5 mg Oral Daily PRN Linda Junior MD      carvedilol  6.25 mg Oral Daily Linda Junior MD      dexamethasone  6 mg Intravenous Q24H Linda Junior MD      dextromethorphan-guaiFENesin  10 mL Oral Q4H PRN Linda Junior MD      docusate sodium  100 mg Oral BID Linda Junior MD      famotidine  40 mg Oral Daily Milady Masha Turner MD      furosemide  20 mg Oral Early Morning Oneida Zamora MD      montelukast  10 mg Oral HS Oneida Zamora MD      ondansetron  4 mg Intravenous Q4H PRN Oneida Zamora MD      risperiDONE  0.25 mg Oral Daily Oneida Zamora MD      sertraline  25 mg Oral Daily Oneida Zamora MD                    ** Please Note: This note is constructed using a voice recognition dictation system. An occasional wrong word/phrase or “sound-a-like” substitution may have been picked up by dictation device due to the inherent limitations of voice recognition software. Read the chart carefully and recognize, using reasonable context, where substitutions may have occurred. **

## 2023-10-30 NOTE — OCCUPATIONAL THERAPY NOTE
Occupational Therapy Progress Note     Patient Name: Irvin James  VXYCV'S Date: 10/30/2023  Problem List  Principal Problem:    Generalized weakness  Active Problems:    [id number] virus infection    Leukocytosis    Thrombocytosis    Atrial fibrillation (HCC)    GERD (gastroesophageal reflux disease)    Psychiatric disorder    History of asthma       10/30/23 1617   OT Last Visit   OT Visit Date 10/30/23  (Tuesday)   Note Type   Note Type Treatment   Pain Assessment   Pain Assessment Tool 0-10   Pain Score No Pain   Patient's Stated Pain Goal No pain   Hospital Pain Intervention(s) Repositioned; Emotional support; Ambulation/increased activity   Restrictions/Precautions   Weight Bearing Precautions Per Order No   Other Precautions Contact/isolation; Airborne/isolation;Droplet precautions; Chair Alarm; Bed Alarm  (Gregorio Congress on room air; COVID+)   Lifestyle   Autonomy Pt reports having assistance w/ bathing and IADLs   Reciprocal Relationships Pt reports living alone; supportive family/ aide assists w/ bathing and IADLs   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   Intrinsic Gratification Pt reports enjoying reading and watching movies   ADL   Where Assessed Edge of bed   Eating Assistance 6  Modified independent   Eating Deficit Setup   Grooming Assistance 5  Supervision/Setup   Grooming Deficit Setup   Grooming Comments seated at EOB to comb hair   UB Bathing Assistance Unable to assess   LB Bathing Assistance Unable to assess   UB Dressing Assistance 5  Supervision/Setup   UB Dressing Deficit Setup;Supervision/safety;Verbal cueing  (seated unsupported at EOB)   LB Dressing Assistance Unable to assess   Toileting Comments denied need to void   Bed Mobility   Supine to Sit 4  Minimal assistance   Additional items Assist x 1; Increased time required;Verbal cues;LE management  (to pt's R)   Sit to Supine 4  Minimal assistance   Additional items Assist x 1;LE management;Verbal cues; Bedrails  (LE mgmt.  Pt prefers assistance w/ 1 leg at a time)   Additional Comments Pt supine HOB elevated upon arrival and at end of session w/ needs met, call bell in reach and bed alarm activated   Transfers   Sit to Stand 4  Minimal assistance   Additional items Assist x 1; Armrests; Increased time required;Verbal cues   Stand to Sit 4  Minimal assistance   Additional items Assist x 1; Increased time required;Verbal cues;Armrests; Bedrails   Additional Comments cues for walker mgmt to complete approach to target surface   Functional Mobility   Functional Mobility 4  Minimal assistance   Additional Comments min A using RW w/ + time household distances on room air   Additional items Rolling walker   Subjective   Subjective "I am glad you are here"   Cognition   Arousal/Participation Alert; Cooperative   Attention Attends with cues to redirect   Orientation Level Oriented X4   Memory Decreased recall of recent events  (details, timeline)   Following Commands Follows multistep commands with increased time or repetition   Comments Identified pt by full name and birthdate. Alert and generally oriented. Benefits from redirection, tangential / perseverative   Activity Tolerance   Activity Tolerance Patient limited by fatigue   Medical Staff Made Aware spoke w/ Babs CARRERA   Assessment   Assessment Pt seen for skilled OT tx session focusing on activity engagement and challenging activity tolerance to maximize functional independence. Pt agreeable and motivated to participate. Pt demonstrated improved activity tolerance and required less physical assistance. Pt completed bed mobility w/ min A. Pt required min A to stand and min A using RW for functional mobility w/ + time. Pt engaged in grooming w/ S seated after set- up. O2 sats >89% on room air throughout session. Will continue to follow   Plan   Treatment Interventions ADL retraining;Functional transfer training;UE strengthening/ROM; Endurance training;Patient/family training;Equipment evaluation/education; Compensatory technique education;Continued evaluation; Energy conservation; Activityengagement   Goal Expiration Date 11/09/23   OT Treatment Day 1  (Monday 10/30/23)   OT Frequency 3-5x/wk   Discharge Recommendation   Rehab Resource Intensity Level, OT II (Moderate Resource Intensity)   AM-PAC Daily Activity Inpatient   Lower Body Dressing 2   Bathing 2   Toileting 3   Upper Body Dressing 3   Grooming 4   Eating 4   Daily Activity Raw Score 18   Daily Activity Standardized Score (Calc for Raw Score >=11) 38.66   -PAC Applied Cognition Inpatient   Following a Speech/Presentation 4   Understanding Ordinary Conversation 4   Taking Medications 3   Remembering Where Things Are Placed or Put Away 4   Remembering List of 4-5 Errands 3   Taking Care of Complicated Tasks 3   Applied Cognition Raw Score 21   Applied Cognition Standardized Score 44.3   Barthel Index   Feeding 10   Bathing 0   Grooming Score 5   Dressing Score 5   Bladder Score 5   Bowels Score 10   Toilet Use Score 5   Transfers (Bed/Chair) Score 10   Mobility (Level Surface) Score 0   Stairs Score 0   Barthel Index Score 50   End of Consult   Patient Position at End of Consult Supine;Bed/Chair alarm activated; All needs within reach   Nurse Communication Nurse aware of consult   Licensure   [address] [place] [address] Number  Krishna Curtis, OTR/L [id number]          The patient's raw score on the AM-PAC Daily Activity Inpatient Short Form is 18. A raw score of less than 19 suggests the patient may benefit from discharge to post-acute rehabilitation services. Please refer to the recommendation of the Occupational Therapist for safe discharge planning.      Krishna Acevedo, OTR/L  [id number]  [id number]

## 2023-10-30 NOTE — CASE MANAGEMENT
Case Management Discharge Planning Note    Patient name Riana Isidro  Location [address] MRN [id number]  : 1935 Date 10/30/2023       Current Admission Date: 10/26/2023  Current Admission Diagnosis:Generalized weakness   Patient Active Problem List    Diagnosis Date Noted    Generalized weakness 10/26/2023    COVID-19 virus infection 10/26/2023    Leukocytosis 10/26/2023    Thrombocytosis 10/26/2023    Atrial fibrillation ([address]) 10/26/2023    GERD (gastroesophageal reflux disease) 10/26/2023    Psychiatric disorder 10/26/2023    History of asthma 10/26/2023      LOS (days): 3  Geometric Mean LOS (GMLOS) (days): 2.50  Days to GMLOS:-0.3     OBJECTIVE:  Risk of Unplanned Readmission Score: 13.24       Current admission status: Inpatient   Preferred Pharmacy:   [address] [place] [address]  Phone: [phone] Fax: [phone]    Primary Care Provider: Chhaya Linda DO    Primary Insurance: MEDICARE  Secondary Insurance: [employer] [place]    DISCHARGE DETAILS:    Discharge planning discussed with[de-identified] Patient  Freedom of Choice: Yes    Comments - Freedom of Choice: SW spoke with patient by phone and she stated that her preference now would be to go to [facility] but she is concerned about costs. Patient stated that she was at [facility] for about 4 weeks and available Medicare days will need to be determined. SW placed blanket referrals in [facility] and will discuss responses with patient when available.      Were Treatment Team discharge recommendations reviewed with patient/caregiver?: Yes  Did patient/caregiver verbalize understanding of patient care needs?: Yes  Were patient/caregiver advised of the risks associated with not following Treatment Team discharge recommendations?: Yes    [address]         Is the patient interested in [facility] at discharge?: No    DME Referral Provided  Referral made for DME?: No    Other Referral/Resources/Interventions Provided:  Interventions: Short Term Rehab    Would you like to participate in our [address] [facility] service program?  : No - Declined    Treatment Team Recommendation: Short Term Rehab  Discharge Destination Plan[de-identified] Short Term Rehab

## 2023-10-30 NOTE — CASE MANAGEMENT
Case Management Discharge Planning Note    Patient name Chaparrita Rodriguez  Location [address] MRN [id number]  : 1935 Date 10/30/2023       Current Admission Date: 10/26/2023  Current Admission Diagnosis:Generalized weakness   Patient Active Problem List    Diagnosis Date Noted    Generalized weakness 10/26/2023    COVID-19 virus infection 10/26/2023    Leukocytosis 10/26/2023    Thrombocytosis 10/26/2023    Atrial fibrillation ([address]) 10/26/2023    GERD (gastroesophageal reflux disease) 10/26/2023    Psychiatric disorder 10/26/2023    History of asthma 10/26/2023      LOS (days): 3  Geometric Mean LOS (GMLOS) (days): 2.50  Days to GMLOS:-0.5     OBJECTIVE:  Risk of Unplanned Readmission Score: 13.3       Current admission status: Inpatient   Preferred Pharmacy:   [address] [place] [address]  Phone: [phone] Fax: [phone]    Primary Care Provider: Donovan Sandifer, DO    Primary Insurance: MEDICARE  Secondary Insurance: [facility]    DISCHARGE DETAILS:    Discharge planning discussed with[de-identified] Patient  Freedom of Choice: Yes    Comments - Freedom of Choice: SW spoke by phone with patient to review patient choice list and she expressed preference for [facility]. Facility was reserved in [facility] and [address] [facility] transport requested in [facility]. DEBORAH received a call from patient's caregiver Kori Conley and she was notified of plan for patient to be discharged this evening to [facility]. DEBORAH again attempted to call patient's daughter with no success.       CM contacted family/caregiver?: Yes  Were Treatment Team discharge recommendations reviewed with patient/caregiver?: Yes  Did patient/caregiver verbalize understanding of patient care needs?: N/A- going to facility  Were patient/caregiver advised of the risks associated with not following Treatment Team discharge recommendations?: Yes    Contacts  Patient Contacts:  Esme Lopezarely (caregiver)  Relationship to Patient[de-identified] Other (Comment)  Contact Method: Phone  Phone Number: [phone]  Reason/Outcome: Discharge [address]         Is the patient interested in [facility] at discharge?: No    DME Referral Provided  Referral made for DME?: No    Other Referral/Resources/Interventions Provided:  Interventions: Short Term Rehab, Transportation    Would you like to participate in our [address] [facility] service program?  : No - Declined    Treatment Team Recommendation: Short Term Rehab  Discharge Destination Plan[de-identified] Short Term Rehab  Transport at Discharge : Wheelchair van  Dispatcher Contacted: Yes  Number/Name of Dispatcher: [facility] via [employer]  Transported by [employer] and Unit #): [employer]  ETA of Transport (Date): 10/30/23  ETA of Transport (Time): 1910         IMM Given (Date):: 10/30/23

## 2023-10-30 NOTE — NURSING NOTE
Report given to [facility] at [facility] by this RN at [phone].

## 2023-10-30 NOTE — PLAN OF CARE
Problem: Potential for Falls  Goal: Patient will remain free of falls  Description: INTERVENTIONS:  - Educate patient/family on patient safety including physical limitations  - Instruct patient to call for assistance with activity   - Consult OT/PT to assist with strengthening/mobility   - Keep Call bell within reach  - Keep bed low and locked with side rails adjusted as appropriate  - Keep care items and personal belongings within reach  - Initiate and maintain comfort rounds  - Make Fall Risk Sign visible to staff  - Offer Toileting every 2 Hours, in advance of need  - Initiate/Maintain bed alarm  - Apply yellow socks and bracelet for high fall risk patients  - Consider moving patient to room near nurses station  Outcome: Progressing     Problem: MOBILITY - ADULT  Goal: Maintain or return to baseline ADL function  Description: INTERVENTIONS:  -  Assess patient's ability to carry out ADLs; assess patient's baseline for ADL function and identify physical deficits which impact ability to perform ADLs (bathing, care of mouth/teeth, toileting, grooming, dressing, etc.)  - Assess/evaluate cause of self-care deficits   - Assess range of motion  - Assess patient's mobility; develop plan if impaired  - Assess patient's need for assistive devices and provide as appropriate  - Encourage maximum independence but intervene and supervise when necessary  - Involve family in performance of ADLs  - Assess for home care needs following discharge   - Consider OT consult to assist with ADL evaluation and planning for discharge  - Provide patient education as appropriate  Outcome: Progressing  Goal: Maintains/Returns to pre admission functional level  Description: INTERVENTIONS:  - Perform BMAT or MOVE assessment daily.   - Set and communicate daily mobility goal to care team and patient/family/caregiver. - Collaborate with rehabilitation services on mobility goals if consulted  - Perform Range of Motion 2 times a day.   - Reposition patient every 2 hours.   - Dangle patient 2 times a day  - Stand patient 2 times a day  - Ambulate patient 2 times a day  - Out of bed to chair 2 times a day   - Out of bed for meals 2 times a day  - Out of bed for toileting  - Record patient progress and toleration of activity level   Outcome: Progressing     Problem: PAIN - ADULT  Goal: Verbalizes/displays adequate comfort level or baseline comfort level  Description: Interventions:  - Encourage patient to monitor pain and request assistance  - Assess pain using appropriate pain scale  - Administer analgesics based on type and severity of pain and evaluate response  - Implement non-pharmacological measures as appropriate and evaluate response  - Consider cultural and social influences on pain and pain management  - Notify physician/advanced practitioner if interventions unsuccessful or patient reports new pain  Outcome: Progressing     Problem: INFECTION - ADULT  Goal: Absence or prevention of progression during hospitalization  Description: INTERVENTIONS:  - Assess and monitor for signs and symptoms of infection  - Monitor lab/diagnostic results  - Monitor all insertion sites, i.e. indwelling lines, tubes, and drains  - Monitor endotracheal if appropriate and nasal secretions for changes in amount and color  - [place] appropriate cooling/warming therapies per order  - Administer medications as ordered  - Instruct and encourage patient and family to use good hand hygiene technique  - Identify and instruct in appropriate isolation precautions for identified infection/condition  Outcome: Progressing     Problem: DISCHARGE PLANNING  Goal: Discharge to home or other facility with appropriate resources  Description: INTERVENTIONS:  - Identify barriers to discharge w/patient and caregiver  - Arrange for needed discharge resources and transportation as appropriate  - Identify discharge learning needs (meds, wound care, etc.)  - Arrange for interpretive services to assist at discharge as needed  - Refer to Case Management Department for coordinating discharge planning if the patient needs post-hospital services based on physician/advanced practitioner order or complex needs related to functional status, cognitive ability, or social support system  Outcome: Progressing     Problem: Prexisting or High Potential for Compromised Skin Integrity  Goal: Skin integrity is maintained or improved  Description: INTERVENTIONS:  - Identify patients at risk for skin breakdown  - Assess and monitor skin integrity  - Assess and monitor nutrition and hydration status  - Monitor labs   - Assess for incontinence   - Turn and reposition patient  - Assist with mobility/ambulation  - Relieve pressure over bony prominences  - Avoid friction and shearing  - Provide appropriate hygiene as needed including keeping skin clean and dry  - Evaluate need for skin moisturizer/barrier cream  - Collaborate with interdisciplinary team   - Patient/family teaching  - Consider wound care consult   Outcome: Progressing     Problem: Knowledge Deficit  Goal: Patient/family/caregiver demonstrates understanding of disease process, treatment plan, medications, and discharge instructions  Description: Complete learning assessment and assess knowledge base.   Interventions:  - Provide teaching at level of understanding  - Provide teaching via preferred learning methods  Outcome: Progressing

## 2023-10-30 NOTE — [place] UNIVERSAL TRANSFER FORM
[place] UNIVERSAL TRANSFER FORM  (ALL ITEMS MUST BE COMPLETED)    1. TRANSFER FROM: [facility] [address]      TRANSFER TO: [facility]    2. DATE OF TRANSFER: 10/30/2023                        TIME OF TRANSFER: 1910    3. PATIENT NAME: Malika Maguire Sires OF BIRTH:  1935                             GENDER: female    4. LANGUAGE:   English    5. PHYSICIAN NAME:  Lele Grant MD                   PHONE: [phone]    6. CODE STATUS: Level 1 - Full Code        Out of Hospital DNR Attached: No    7. :                                      :  Extended Emergency Contact Information  Primary Emergency Contact: [facility]  Mobile Phone: [phone]  Relation: Daughter  Secondary Emergency Contact: KRISHNA Bradley  Mobile Phone: [phone]  Relation: [address] [place] [address] Representative/Proxy:  No           Legal Guardian:  No             NAME OF:           HEALTH CARE REPRESENTATIVE/PROXY:                                         OR           LEGAL GUARDIAN, IF NOT :                                               PHONE:  (Day)           (Night)                        (Cell)    8. REASON FOR TRANSFER: (Must include brief medical history and recent changes in physical function or cognition.) generalized weakness, COVID-19 positive 10/26/2023            V/S: /66   Pulse 60   Temp 97.5 °F (36.4 °C)   Resp 18   Ht 5' 4" (1.626 m)   SpO2 94%   BMI 39.99 kg/m²           PAIN: None    9. PRIMARY DIAGNOSIS: Generalized weakness      Secondary Diagnosis:         Pacemaker: Yes      Internal Defib: No          Mental Health Diagnosis (if Applicable):    10. RESTRAINTS: No     11. RESPIRATORY NEEDS: None    12. ISOLATION/PRECAUTION: OtherCOVID-19    13.  ALLERGY: Chocolate - food allergy, Ciprofloxacin, Doxycycline, Peanut oil - food allergy, Penicillins, Soybean oil - food allergy, and Erythromycin base    14. SENSORY:       Vision Good, Hearing Good , and Speech Clear    15. SKIN CONDITION: No Wounds    16. DIET: Special (describe)Cardiovascular; Sodium 4g    17. IV ACCESS: None    18. PERSONAL ITEMS SENT WITH PATIENT: OtherClothing (pants;shirt;sweater;footwear;socks;underpants); Electronic devices (cell phone, tablet, ); purse; bible    19. ATTACHED DOCUMENTS: MUST ATTACH CURRENT MEDICATION INFORMATION Face Sheet and Discharge Summary    20. AT RISK ALERTS:Falls        HARM TO: N/A    21. WEIGHT BEARING STATUS:         Left Leg: Limited        Right Leg: Limited    22. MENTAL STATUS:Alert, Oriented, and Forgetful    23. FUNCTION:        Walk: With Help        Transfer: With Help        Toilet: With Help        Feed: Self    24. IMMUNIZATIONS/SCREENING:     There is no immunization history on file for this patient. 25. BOWEL: Continent    26. BLADDER: Incontinent    27.  SENDING FACILITY CONTACT: Apolonio Gowers RN                  Title: RN        Unit: [facility]        Phone: [phone] [address] (if known):        Title:        Unit:         Phone:         FORM PREFILLED BY (if applicable)       Title:       Unit:        Phone:         FORM COMPLETED BY Yesy Tucker RN      Title: RN      Phone: [phone]

## 2023-10-30 NOTE — PLAN OF CARE
Problem: Potential for Falls  Goal: Patient will remain free of falls  Description: INTERVENTIONS:  - Educate patient/family on patient safety including physical limitations  - Instruct patient to call for assistance with activity   - Consult OT/PT to assist with strengthening/mobility   - Keep Call bell within reach  - Keep bed low and locked with side rails adjusted as appropriate  - Keep care items and personal belongings within reach  - Initiate and maintain comfort rounds  - Make Fall Risk Sign visible to staff  - Offer Toileting every 2 Hours, in advance of need  - Initiate/Maintain bed alarm  - Apply yellow socks and bracelet for high fall risk patients  - Consider moving patient to room near nurses station  Outcome: Progressing     Problem: MOBILITY - ADULT  Goal: Maintain or return to baseline ADL function  Description: INTERVENTIONS:  -  Assess patient's ability to carry out ADLs; assess patient's baseline for ADL function and identify physical deficits which impact ability to perform ADLs (bathing, care of mouth/teeth, toileting, grooming, dressing, etc.)  - Assess/evaluate cause of self-care deficits   - Assess range of motion  - Assess patient's mobility; develop plan if impaired  - Assess patient's need for assistive devices and provide as appropriate  - Encourage maximum independence but intervene and supervise when necessary  - Involve family in performance of ADLs  - Assess for home care needs following discharge   - Consider OT consult to assist with ADL evaluation and planning for discharge  - Provide patient education as appropriate  Outcome: Progressing  Goal: Maintains/Returns to pre admission functional level  Description: INTERVENTIONS:  - Perform BMAT or MOVE assessment daily.   - Set and communicate daily mobility goal to care team and patient/family/caregiver. - Collaborate with rehabilitation services on mobility goals if consulted  - Perform Range of Motion 2 times a day.   - Reposition patient every 2 hours.   - Dangle patient 2 times a day  - Stand patient 2 times a day  - Ambulate patient 2 times a day  - Out of bed to chair 2 times a day   - Out of bed for meals 2 times a day  - Out of bed for toileting  - Record patient progress and toleration of activity level   Outcome: Progressing     Problem: PAIN - ADULT  Goal: Verbalizes/displays adequate comfort level or baseline comfort level  Description: Interventions:  - Encourage patient to monitor pain and request assistance  - Assess pain using appropriate pain scale  - Administer analgesics based on type and severity of pain and evaluate response  - Implement non-pharmacological measures as appropriate and evaluate response  - Consider cultural and social influences on pain and pain management  - Notify physician/advanced practitioner if interventions unsuccessful or patient reports new pain  Outcome: Progressing     Problem: INFECTION - ADULT  Goal: Absence or prevention of progression during hospitalization  Description: INTERVENTIONS:  - Assess and monitor for signs and symptoms of infection  - Monitor lab/diagnostic results  - Monitor all insertion sites, i.e. indwelling lines, tubes, and drains  - Monitor endotracheal if appropriate and nasal secretions for changes in amount and color  - [place] appropriate cooling/warming therapies per order  - Administer medications as ordered  - Instruct and encourage patient and family to use good hand hygiene technique  - Identify and instruct in appropriate isolation precautions for identified infection/condition  Outcome: Progressing     Problem: DISCHARGE PLANNING  Goal: Discharge to home or other facility with appropriate resources  Description: INTERVENTIONS:  - Identify barriers to discharge w/patient and caregiver  - Arrange for needed discharge resources and transportation as appropriate  - Identify discharge learning needs (meds, wound care, etc.)  - Arrange for interpretive services to assist at discharge as needed  - Refer to Case Management Department for coordinating discharge planning if the patient needs post-hospital services based on physician/advanced practitioner order or complex needs related to functional status, cognitive ability, or social support system  Outcome: Progressing     Problem: Knowledge Deficit  Goal: Patient/family/caregiver demonstrates understanding of disease process, treatment plan, medications, and discharge instructions  Description: Complete learning assessment and assess knowledge base.   Interventions:  - Provide teaching at level of understanding  - Provide teaching via preferred learning methods  Outcome: Progressing     Problem: Prexisting or High Potential for Compromised Skin Integrity  Goal: Skin integrity is maintained or improved  Description: INTERVENTIONS:  - Identify patients at risk for skin breakdown  - Assess and monitor skin integrity  - Assess and monitor nutrition and hydration status  - Monitor labs   - Assess for incontinence   - Turn and reposition patient  - Assist with mobility/ambulation  - Relieve pressure over bony prominences  - Avoid friction and shearing  - Provide appropriate hygiene as needed including keeping skin clean and dry  - Evaluate need for skin moisturizer/barrier cream  - Collaborate with interdisciplinary team   - Patient/family teaching  - Consider wound care consult   Outcome: Progressing

## 2023-10-30 NOTE — PROGRESS NOTES
Patient:  Akbar Verduzco    MRN:  [id number]    Trisha Request ID:  [id number]    Level of care reserved:  [address]    Partner Reserved:  [address] [facility] [place] [address] [phone]    Clinical needs requested:    Geography searched:  20 miles around [id number]    Start of Service:    Request sent:  10:39am EDT on 10/30/2023 by Michelle Inman    Partner reserved:  3:47pm EDT on 10/30/2023 by Michelle Inman    Choice list shared:  3:43pm EDT on 10/30/2023 by Michelle Inman

## 2023-12-28 ENCOUNTER — RA CDI HCC (OUTPATIENT)
Dept: OTHER | Facility: HOSPITAL | Age: 88
End: 2023-12-28

## 2024-01-13 ENCOUNTER — HOSPITAL ENCOUNTER (EMERGENCY)
Facility: HOSPITAL | Age: 89
Discharge: HOME | End: 2024-01-13
Attending: EMERGENCY MEDICINE | Admitting: EMERGENCY MEDICINE
Payer: MEDICARE

## 2024-01-13 ENCOUNTER — APPOINTMENT (EMERGENCY)
Dept: RADIOLOGY | Facility: HOSPITAL | Age: 89
End: 2024-01-13
Payer: MEDICARE

## 2024-01-13 VITALS
OXYGEN SATURATION: 96 % | DIASTOLIC BLOOD PRESSURE: 60 MMHG | HEART RATE: 60 BPM | SYSTOLIC BLOOD PRESSURE: 127 MMHG | RESPIRATION RATE: 18 BRPM | TEMPERATURE: 97.9 F

## 2024-01-13 DIAGNOSIS — K76.9 LIVER LESION: ICD-10-CM

## 2024-01-13 DIAGNOSIS — K29.70 GASTRITIS: ICD-10-CM

## 2024-01-13 DIAGNOSIS — K86.2 PANCREATIC CYST: Primary | ICD-10-CM

## 2024-01-13 LAB
2HR DELTA HS TROPONIN: 8 NG/L
ALBUMIN SERPL BCP-MCNC: 3.5 G/DL (ref 3.5–5)
ALP SERPL-CCNC: 60 U/L (ref 34–104)
ALT SERPL W P-5'-P-CCNC: 6 U/L (ref 7–52)
AMORPH PHOS CRY URNS QL MICRO: ABNORMAL /HPF
ANION GAP SERPL CALCULATED.3IONS-SCNC: 4 MMOL/L
AST SERPL W P-5'-P-CCNC: 11 U/L (ref 13–39)
ATRIAL RATE: 60 BPM
BACTERIA UR QL AUTO: ABNORMAL /HPF
BASOPHILS # BLD AUTO: 0.04 THOUSANDS/ÂΜL (ref 0–0.1)
BASOPHILS NFR BLD AUTO: 1 % (ref 0–1)
BILIRUB SERPL-MCNC: 0.35 MG/DL (ref 0.2–1)
BILIRUB UR QL STRIP: NEGATIVE
BNP SERPL-MCNC: 64 PG/ML (ref 0–100)
BUN SERPL-MCNC: 15 MG/DL (ref 5–25)
CALCIUM SERPL-MCNC: 10.5 MG/DL (ref 8.4–10.2)
CARDIAC TROPONIN I PNL SERPL HS: 19 NG/L
CARDIAC TROPONIN I PNL SERPL HS: 27 NG/L
CHLORIDE SERPL-SCNC: 103 MMOL/L (ref 96–108)
CLARITY UR: CLEAR
CO2 SERPL-SCNC: 30 MMOL/L (ref 21–32)
COLOR UR: ABNORMAL
CREAT SERPL-MCNC: 0.77 MG/DL (ref 0.6–1.3)
EOSINOPHIL # BLD AUTO: 0.42 THOUSAND/ÂΜL (ref 0–0.61)
EOSINOPHIL NFR BLD AUTO: 6 % (ref 0–6)
ERYTHROCYTE [DISTWIDTH] IN BLOOD BY AUTOMATED COUNT: 15.6 % (ref 11.6–15.1)
FLUAV RNA RESP QL NAA+PROBE: NEGATIVE
FLUBV RNA RESP QL NAA+PROBE: NEGATIVE
GFR SERPL CREATININE-BSD FRML MDRD: 69 ML/MIN/1.73SQ M
GLUCOSE SERPL-MCNC: 90 MG/DL (ref 65–140)
GLUCOSE UR STRIP-MCNC: NEGATIVE MG/DL
HCT VFR BLD AUTO: 37.7 % (ref 34.8–46.1)
HGB BLD-MCNC: 12.2 G/DL (ref 11.5–15.4)
HGB UR QL STRIP.AUTO: NEGATIVE
IMM GRANULOCYTES # BLD AUTO: 0.03 THOUSAND/UL (ref 0–0.2)
IMM GRANULOCYTES NFR BLD AUTO: 0 % (ref 0–2)
KETONES UR STRIP-MCNC: NEGATIVE MG/DL
LEUKOCYTE ESTERASE UR QL STRIP: ABNORMAL
LYMPHOCYTES # BLD AUTO: 2 THOUSANDS/ÂΜL (ref 0.6–4.47)
LYMPHOCYTES NFR BLD AUTO: 26 % (ref 14–44)
MAGNESIUM SERPL-MCNC: 2.1 MG/DL (ref 1.9–2.7)
MCH RBC QN AUTO: 28.4 PG (ref 26.8–34.3)
MCHC RBC AUTO-ENTMCNC: 32.4 G/DL (ref 31.4–37.4)
MCV RBC AUTO: 88 FL (ref 82–98)
MONOCYTES # BLD AUTO: 0.84 THOUSAND/ÂΜL (ref 0.17–1.22)
MONOCYTES NFR BLD AUTO: 11 % (ref 4–12)
NEUTROPHILS # BLD AUTO: 4.29 THOUSANDS/ÂΜL (ref 1.85–7.62)
NEUTS SEG NFR BLD AUTO: 56 % (ref 43–75)
NITRITE UR QL STRIP: NEGATIVE
NON-SQ EPI CELLS URNS QL MICRO: ABNORMAL /HPF
NRBC BLD AUTO-RTO: 0 /100 WBCS
PH UR STRIP.AUTO: 7 [PH]
PLATELET # BLD AUTO: 397 THOUSANDS/UL (ref 149–390)
PMV BLD AUTO: 10 FL (ref 8.9–12.7)
POTASSIUM SERPL-SCNC: 3.8 MMOL/L (ref 3.5–5.3)
PR INTERVAL: 278 MS
PROT SERPL-MCNC: 6.4 G/DL (ref 6.4–8.4)
PROT UR STRIP-MCNC: NEGATIVE MG/DL
QRS AXIS: -79 DEGREES
QRSD INTERVAL: 184 MS
QT INTERVAL: 474 MS
QTC INTERVAL: 474 MS
RBC # BLD AUTO: 4.3 MILLION/UL (ref 3.81–5.12)
RBC #/AREA URNS AUTO: ABNORMAL /HPF
RSV RNA RESP QL NAA+PROBE: NEGATIVE
SARS-COV-2 RNA RESP QL NAA+PROBE: NEGATIVE
SODIUM SERPL-SCNC: 137 MMOL/L (ref 135–147)
SP GR UR STRIP.AUTO: 1.01 (ref 1–1.03)
T WAVE AXIS: 58 DEGREES
UROBILINOGEN UR QL STRIP.AUTO: 0.2 E.U./DL
VENTRICULAR RATE: 60 BPM
WBC # BLD AUTO: 7.62 THOUSAND/UL (ref 4.31–10.16)
WBC #/AREA URNS AUTO: ABNORMAL /HPF

## 2024-01-13 PROCEDURE — 71045 X-RAY EXAM CHEST 1 VIEW: CPT

## 2024-01-13 PROCEDURE — 87086 URINE CULTURE/COLONY COUNT: CPT | Performed by: EMERGENCY MEDICINE

## 2024-01-13 PROCEDURE — 99285 EMERGENCY DEPT VISIT HI MDM: CPT | Performed by: EMERGENCY MEDICINE

## 2024-01-13 PROCEDURE — 81001 URINALYSIS AUTO W/SCOPE: CPT | Performed by: EMERGENCY MEDICINE

## 2024-01-13 PROCEDURE — 36415 COLL VENOUS BLD VENIPUNCTURE: CPT | Performed by: EMERGENCY MEDICINE

## 2024-01-13 PROCEDURE — 74176 CT ABD & PELVIS W/O CONTRAST: CPT

## 2024-01-13 PROCEDURE — 84484 ASSAY OF TROPONIN QUANT: CPT | Performed by: EMERGENCY MEDICINE

## 2024-01-13 PROCEDURE — 0241U HB NFCT DS VIR RESP RNA 4 TRGT: CPT | Performed by: EMERGENCY MEDICINE

## 2024-01-13 PROCEDURE — 96360 HYDRATION IV INFUSION INIT: CPT

## 2024-01-13 PROCEDURE — 83880 ASSAY OF NATRIURETIC PEPTIDE: CPT | Performed by: EMERGENCY MEDICINE

## 2024-01-13 PROCEDURE — 93005 ELECTROCARDIOGRAM TRACING: CPT

## 2024-01-13 PROCEDURE — 99285 EMERGENCY DEPT VISIT HI MDM: CPT

## 2024-01-13 PROCEDURE — 83735 ASSAY OF MAGNESIUM: CPT | Performed by: EMERGENCY MEDICINE

## 2024-01-13 PROCEDURE — 80053 COMPREHEN METABOLIC PANEL: CPT | Performed by: EMERGENCY MEDICINE

## 2024-01-13 PROCEDURE — 85025 COMPLETE CBC W/AUTO DIFF WBC: CPT | Performed by: EMERGENCY MEDICINE

## 2024-01-13 PROCEDURE — G1004 CDSM NDSC: HCPCS

## 2024-01-13 RX ORDER — PANTOPRAZOLE SODIUM 20 MG/1
20 TABLET, DELAYED RELEASE ORAL DAILY
Qty: 20 TABLET | Refills: 0 | Status: SHIPPED | OUTPATIENT
Start: 2024-01-13

## 2024-01-13 RX ORDER — ONDANSETRON 4 MG/1
4 TABLET, ORALLY DISINTEGRATING ORAL EVERY 6 HOURS PRN
Qty: 9 TABLET | Refills: 0 | Status: SHIPPED | OUTPATIENT
Start: 2024-01-13 | End: 2024-01-16

## 2024-01-13 RX ADMIN — SODIUM CHLORIDE 500 ML: 0.9 INJECTION, SOLUTION INTRAVENOUS at 14:29

## 2024-01-13 NOTE — ED PROVIDER NOTES
History  Chief Complaint   Patient presents with    Weakness - Generalized    Vomiting     Pt states has been felling weak since Thursday, normally has issues with vomiting but today was vomiting more than usual. Also c/o pain right flank since yesterday. States dx with a UTI last week, still on antibiotics.      88-year-old female presents with multiple complaints is complaining of right flank pain nausea vomiting still on antibiotics for UTI.  She also has some left-sided lower abdominal upper abdominal pain that radiates her breast and she burps in order to relieve it for several weeks.  Has some edema in her legs as well denies any chest pain chest pressure shortness of breath or any other symptoms.  No diarrhea or constipation.      History provided by:  Patient   used: No        Prior to Admission Medications   Prescriptions Last Dose Informant Patient Reported? Taking?   Apixaban (ELIQUIS PO) 1/13/2024  Yes Yes   Sig: Take 5 mg by mouth 2 (two) times a day   acetaminophen (TYLENOL) 325 mg tablet Past Month Outside Facility (Specify) Yes Yes   Sig: Take 650 mg by mouth every 6 (six) hours as needed for mild pain   albuterol (PROVENTIL HFA,VENTOLIN HFA) 90 mcg/act inhaler Unknown  Yes No   bisacodyl (bisacodyl) 5 mg EC tablet Unknown  Yes No   Sig: Take 5 mg by mouth daily as needed for constipation   carvedilol (COREG) 6.25 mg tablet 1/13/2024  Yes Yes   Sig: Take 6.25 mg by mouth in the morning   clotrimazole (LOTRIMIN) 1 % cream Not Taking  No No   Sig: Apply topically 2 (two) times a day   Patient not taking: Reported on 1/13/2024   dextromethorphan-guaiFENesin (ROBITUSSIN DM)  mg/5 mL syrup Not Taking  No No   Sig: Take 10 mL by mouth every 4 (four) hours as needed for cough or congestion   Patient not taking: Reported on 1/13/2024   docusate sodium (COLACE) 100 mg capsule Unknown Self Yes No   Sig: Take 100 mg by mouth 2 (two) times a day   famotidine (PEPCID) 40 MG tablet  Unknown Self Yes No   Sig: Take 40 mg by mouth daily   furosemide (LASIX) 20 mg tablet 1/13/2024  Yes Yes   Sig: Take 20 mg by mouth in the morning   montelukast (SINGULAIR) 10 mg tablet 1/13/2024  Yes Yes   risperiDONE (RisperDAL) 0.25 mg tablet 1/13/2024 Self Yes Yes   Sig: Take 0.25 mg by mouth daily   sertraline (ZOLOFT) 25 mg tablet 1/13/2024  Yes Yes   Sig: Take 25 mg by mouth daily      Facility-Administered Medications: None       Past Medical History:   Diagnosis Date    Cardiac disease     Lyme disease        Past Surgical History:   Procedure Laterality Date    CARDIAC PACEMAKER PLACEMENT      CHOLECYSTECTOMY      HYSTERECTOMY      JOINT REPLACEMENT         History reviewed. No pertinent family history.  I have reviewed and agree with the history as documented.    E-Cigarette/Vaping    E-Cigarette Use Never User      E-Cigarette/Vaping Substances    Nicotine No     THC No     CBD No     Flavoring No     Other No     Unknown No      Social History     Tobacco Use    Smoking status: Never    Smokeless tobacco: Never   Vaping Use    Vaping status: Never Used   Substance Use Topics    Alcohol use: Never    Drug use: No       Review of Systems   Constitutional: Negative.    HENT: Negative.     Eyes: Negative.    Respiratory: Negative.     Cardiovascular: Negative.    Gastrointestinal:  Positive for nausea and vomiting.   Endocrine: Negative.    Genitourinary:  Positive for flank pain.   Skin: Negative.    Allergic/Immunologic: Negative.    Neurological: Negative.    Hematological: Negative.    Psychiatric/Behavioral: Negative.     All other systems reviewed and are negative.      Physical Exam  Physical Exam  Vitals and nursing note reviewed.   Constitutional:       Appearance: Normal appearance.   HENT:      Head: Normocephalic and atraumatic.      Nose: Nose normal.      Mouth/Throat:      Mouth: Mucous membranes are moist.   Eyes:      Extraocular Movements: Extraocular movements intact.      Pupils: Pupils  are equal, round, and reactive to light.   Cardiovascular:      Rate and Rhythm: Normal rate and regular rhythm.   Pulmonary:      Effort: Pulmonary effort is normal.      Breath sounds: Normal breath sounds.   Abdominal:      General: Abdomen is flat. Bowel sounds are normal.      Palpations: Abdomen is soft.      Tenderness: There is abdominal tenderness.      Comments: Right CVA tenderness     Musculoskeletal:         General: Normal range of motion.      Cervical back: Normal range of motion and neck supple.      Right lower leg: Edema present.      Left lower leg: Edema present.   Skin:     General: Skin is warm.      Capillary Refill: Capillary refill takes less than 2 seconds.   Neurological:      General: No focal deficit present.      Mental Status: She is alert and oriented to person, place, and time. Mental status is at baseline.   Psychiatric:         Mood and Affect: Mood normal.         Thought Content: Thought content normal.         Vital Signs  ED Triage Vitals [01/13/24 1351]   Temperature Pulse Respirations Blood Pressure SpO2   97.9 °F (36.6 °C) 60 16 115/69 94 %      Temp Source Heart Rate Source Patient Position - Orthostatic VS BP Location FiO2 (%)   Tympanic -- Lying Left arm --      Pain Score       No Pain           Vitals:    01/13/24 1656 01/13/24 1726 01/13/24 1811 01/13/24 1830   BP: 139/63   127/60   Pulse:  60 60 60   Patient Position - Orthostatic VS:             Visual Acuity  Visual Acuity      Flowsheet Row Most Recent Value   L Pupil Size (mm) 2   R Pupil Size (mm) 2            ED Medications  Medications   sodium chloride 0.9 % bolus 500 mL (0 mL Intravenous Stopped 1/13/24 1520)       Diagnostic Studies  Results Reviewed       Procedure Component Value Units Date/Time    HS Troponin I 2hr [[id number]]  (Normal) Collected: 01/13/24 1835    Lab Status: Final result Specimen: Blood from Arm, Left Updated: 01/13/24 1908     hs TnI 2hr 27 ng/L      Delta 2hr hsTnI 8 ng/L     Urine  Microscopic [[id number]]  (Abnormal) Collected: 01/13/24 1518    Lab Status: Final result Specimen: Urine, Clean Catch Updated: 01/13/24 1618     RBC, UA 0-1 /hpf      WBC, UA 4-10 /hpf      Epithelial Cells Occasional /hpf      Bacteria, UA Occasional /hpf      AMORPH PHOSPATES Occasional /hpf     Comprehensive metabolic panel [[id number]]  (Abnormal) Collected: 01/13/24 1518    Lab Status: Final result Specimen: Blood from Hand, Left Updated: 01/13/24 1552     Sodium 137 mmol/L      Potassium 3.8 mmol/L      Chloride 103 mmol/L      CO2 30 mmol/L      ANION GAP 4 mmol/L      BUN 15 mg/dL      Creatinine 0.77 mg/dL      Glucose 90 mg/dL      Calcium 10.5 mg/dL      AST 11 U/L      ALT 6 U/L      Alkaline Phosphatase 60 U/L      Total Protein 6.4 g/dL      Albumin 3.5 g/dL      Total Bilirubin 0.35 mg/dL      eGFR 69 ml/min/1.73sq m     Narrative:      National Kidney Disease Foundation guidelines for Chronic Kidney Disease (CKD):     Stage 1 with normal or high GFR (GFR > 90 mL/min/1.73 square meters)    Stage 2 Mild CKD (GFR = 60-89 mL/min/1.73 square meters)    Stage 3A Moderate CKD (GFR = 45-59 mL/min/1.73 square meters)    Stage 3B Moderate CKD (GFR = 30-44 mL/min/1.73 square meters)    Stage 4 Severe CKD (GFR = 15-29 mL/min/1.73 square meters)    Stage 5 End Stage CKD (GFR <15 mL/min/1.73 square meters)  Note: GFR calculation is accurate only with a steady state creatinine    Magnesium [[id number]]  (Normal) Collected: 01/13/24 1518    Lab Status: Final result Specimen: Blood from Hand, Left Updated: 01/13/24 1552     Magnesium 2.1 mg/dL     UA (URINE) with reflex to Scope [[id number]]  (Abnormal) Collected: 01/13/24 1518    Lab Status: Final result Specimen: Urine, Clean Catch Updated: 01/13/24 1537     Color, UA Light Yellow     Clarity, UA Clear     Specific Gravity, UA 1.010     pH, UA 7.0     Leukocytes, UA Moderate     Nitrite, UA Negative     Protein, UA Negative mg/dl      Glucose, UA Negative mg/dl       Ketones, UA Negative mg/dl      Urobilinogen, UA 0.2 E.U./dl      Bilirubin, UA Negative     Occult Blood, UA Negative    Urine culture [[id number]] Collected: 01/13/24 1519    Lab Status: In process Specimen: Urine, Clean Catch Updated: 01/13/24 1526    HS Troponin 0hr (reflex protocol) [[id number]]  (Normal) Collected: 01/13/24 1449    Lab Status: Final result Specimen: Blood from Hand, Left Updated: 01/13/24 1517     hs TnI 0hr 19 ng/L     FLU/RSV/COVID - if FLU/RSV clinically relevant [[id number]]  (Normal) Collected: 01/13/24 1417    Lab Status: Final result Specimen: Nares from Nose Updated: 01/13/24 1501     SARS-CoV-2 Negative     INFLUENZA A PCR Negative     INFLUENZA B PCR Negative     RSV PCR Negative    Narrative:      FOR PEDIATRIC PATIENTS - copy/paste COVID Guidelines URL to browser: [email or web address]    SARS-CoV-2 assay is a Nucleic Acid Amplification assay intended for the  qualitative detection of nucleic acid from SARS-CoV-2 in nasopharyngeal  swabs. Results are for the presumptive identification of SARS-CoV-2 RNA.    Positive results are indicative of infection with SARS-CoV-2, the virus  causing COVID-19, but do not rule out bacterial infection or co-infection  with other viruses. Laboratories within the [place] and its  territories are required to report all positive results to the appropriate  public health authorities. Negative results do not preclude SARS-CoV-2  infection and should not be used as the sole basis for treatment or other  patient management decisions. Negative results must be combined with  clinical observations, patient history, and epidemiological information.  This test has not been FDA cleared or approved.    This test has been authorized by FDA under an Emergency Use Authorization  (EUA). This test is only authorized for the duration of time the  declaration that circumstances exist justifying the authorization of  the  emergency use of an in vitro diagnostic tests for detection of SARS-CoV-2  virus and/or diagnosis of COVID-19 infection under section 564(b)(1) of  the Act, 21 U.S.C. 360bbb-3(b)(1), unless the authorization is terminated  or revoked sooner. The test has been validated but independent review by FDA  and CLIA is pending.    Test performed using [employer] GeneXpert: This RT-PCR assay targets N2,  a region unique to SARS-CoV-2. A conserved region in the E-gene was chosen  for pan-Sarbecovirus detection which includes SARS-CoV-2.    According to CMS-2020-01-R, this platform meets the definition of high-throughput technology.    B-Type Natriuretic Peptide(BNP) [[id number]]  (Normal) Collected: 01/13/24 1417    Lab Status: Final result Specimen: Blood from Hand, Left Updated: 01/13/24 1452     BNP 64 pg/mL     CBC and differential [[id number]]  (Abnormal) Collected: 01/13/24 1417    Lab Status: Final result Specimen: Blood from Hand, Left Updated: 01/13/24 1439     WBC 7.62 Thousand/uL      RBC 4.30 Million/uL      Hemoglobin 12.2 g/dL      Hematocrit 37.7 %      MCV 88 fL      MCH 28.4 pg      MCHC 32.4 g/dL      RDW 15.6 %      MPV 10.0 fL      Platelets 397 Thousands/uL      nRBC 0 /100 WBCs      Neutrophils Relative 56 %      Immat GRANS % 0 %      Lymphocytes Relative 26 %      Monocytes Relative 11 %      Eosinophils Relative 6 %      Basophils Relative 1 %      Neutrophils Absolute 4.29 Thousands/µL      Immature Grans Absolute 0.03 Thousand/uL      Lymphocytes Absolute 2.00 Thousands/µL      Monocytes Absolute 0.84 Thousand/µL      Eosinophils Absolute 0.42 Thousand/µL      Basophils Absolute 0.04 Thousands/µL                    CT abdomen pelvis wo contrast   Final Result by Blane Leonard MD (01/13 1849)   Exam is limited without IV and oral contrast particularly for soft tissue and bowel evaluation.      1. Moderate sized fat-containing hernia inferior to the umbilicus with scattered infiltration of fat and  mild fluid. Incarcerated hernia is not excluded.  Correlate with site of pain.   2. Moderate distention of the partially visualized distal esophagus which is fluid-filled. Question related to reflux or esophageal dysfunction.   3. 4.3 cm hypodense area in the left lobe of the liver posteriorly indeterminate. Numerous cystic areas in the pancreas. Numerous renal cystic lesions bilaterally some of which appear complex warranting further evaluation. Next best imaging test for this    patient would likely be CT with and without IV contrast renal protocol on a nonemergent basis.      The study was marked in EPIC for immediate notification.      Workstation performed: [employer]         XR chest 1 view portable    (Results Pending)              Procedures  ECG 12 Lead Documentation Only    Date/Time: 1/13/2024 7:11 PM    Performed by: Larissa Lagunas DO  Authorized by: Larissa Lagunas DO    ECG reviewed by me, the ED Provider: yes    Patient location:  ED  Previous ECG:     Previous ECG:  Unavailable    Comparison to cardiac monitor: Yes    Interpretation:     Interpretation: non-specific    Rate:     ECG rate assessment: normal    Rhythm:     Rhythm: paced    Pacing:     Capture:  Complete    Type of pacing:  AV  Ectopy:     Ectopy: none    QRS:     QRS axis:  Normal  Conduction:     Conduction: normal    ST segments:     ST segments:  Non-specific  T waves:     T waves: non-specific             ED Course                                             Medical Decision Making  Patient evaluated with labs EKG imaging.  I reviewed the results and discussed them with the patient.  Patient discharged with appropriate instructions medications and follow-up.  Patient verbalized understanding had no further questions at the time of discharge.  Patient had stable vital signs and well-appearing at the time of discharge.    Problems Addressed:  Gastritis: acute illness or injury  Liver lesion: acute illness or injury  Pancreatic cyst:  acute illness or injury    Amount and/or Complexity of Data Reviewed  External Data Reviewed: notes.  Labs: ordered. Decision-making details documented in ED Course.  Radiology: ordered. Decision-making details documented in ED Course.  ECG/medicine tests: ordered and independent interpretation performed. Decision-making details documented in ED Course.    Risk  Prescription drug management.             Disposition  Final diagnoses:   Pancreatic cyst   Liver lesion   Gastritis     Time reflects when diagnosis was documented in both MDM as applicable and the Disposition within this note       Time User Action Codes Description Comment    1/13/2024  7:02 PM Anepu, Larissa Add [K86.2] Pancreatic cyst     1/13/2024  7:02 PM Anepu, Larissa Add [K76.9] Liver lesion     1/13/2024  7:02 PM Anepu, Larissa Add [K29.70] Gastritis           ED Disposition       ED Disposition   Discharge    Condition   Stable    Date/Time   Sat Jan 13, 2024  7:01 PM    Comment                   Follow-up Information       Follow up With Specialties Details Why Contact Info Additional Information    Charles Sanchez MD Gastroenterology Schedule an appointment as soon as possible for a visit   [address] [address]  [phone]       [facility] Emergency Medicine  If symptoms worsen [address] [facility] [address]  [phone] [facility], [address] [place] [address], [id number]    John Nguyen DO    [address] [facility]  [address]  [facility] [place]  [phone]       [facility]   [address] [place] [address]  [address]  [facility] [address]  [phone]               Patient's Medications   Discharge Prescriptions    ONDANSETRON (ZOFRAN-ODT) 4 MG DISINTEGRATING TABLET    Take 1 tablet (4 mg total) by mouth every 6 (six) hours as needed for nausea for up to 3 days       Start Date: 1/13/2024 End  Date: 1/16/2024       Order Dose: 4 mg       Quantity: 9 tablet    Refills: 0    PANTOPRAZOLE (PROTONIX) 20 MG TABLET    Take 1 tablet (20 mg total) by mouth daily       Start Date: 1/13/2024 End Date: --       Order Dose: 20 mg       Quantity: 20 tablet    Refills: 0       No discharge procedures on file.    PDMP Review       None            ED Provider  Electronically Signed by             Larissa Lagunas DO  01/13/24 5057

## 2024-01-14 NOTE — DISCHARGE INSTRUCTIONS
4.3 cm hypodense area in the left lobe of the liver posteriorly indeterminate. Numerous cystic areas in the pancreas. Numerous renal cystic lesions bilaterally some of which appear complex warranting further evaluation. Next best imaging test for this  patient would likely be CT with and without IV contrast renal protocol on a nonemergent basis.     Please finish out the antibiotics and take the medications as prescribed.

## 2024-01-15 LAB
ATRIAL RATE: 60 BPM
BACTERIA UR CULT: NORMAL
PR INTERVAL: 278 MS
QRS AXIS: -79 DEGREES
QRSD INTERVAL: 184 MS
QT INTERVAL: 474 MS
QTC INTERVAL: 474 MS
T WAVE AXIS: 58 DEGREES
VENTRICULAR RATE: 60 BPM

## 2024-01-19 ENCOUNTER — APPOINTMENT (OUTPATIENT)
Dept: PHYSICAL THERAPY | Facility: CLINIC | Age: 89
End: 2024-01-19
Payer: MEDICARE

## 2024-01-23 ENCOUNTER — EVALUATION (OUTPATIENT)
Dept: PHYSICAL THERAPY | Facility: CLINIC | Age: 89
End: 2024-01-23
Payer: MEDICARE

## 2024-01-23 DIAGNOSIS — M25.611 SHOULDER STIFFNESS, RIGHT: ICD-10-CM

## 2024-01-23 DIAGNOSIS — R53.1 GENERALIZED WEAKNESS: ICD-10-CM

## 2024-01-23 DIAGNOSIS — M25.512 LEFT SHOULDER PAIN, UNSPECIFIED CHRONICITY: Primary | ICD-10-CM

## 2024-01-23 DIAGNOSIS — M25.511 RIGHT SHOULDER PAIN, UNSPECIFIED CHRONICITY: ICD-10-CM

## 2024-01-23 PROCEDURE — 97162 PT EVAL MOD COMPLEX 30 MIN: CPT

## 2024-01-23 NOTE — PROGRESS NOTES
PT Evaluation     Today's date: 2024  Patient name: Kristen Maguire  : 1935  MRN: [id number]  Referring provider: Mary Fajardo MD  Dx:   Encounter Diagnosis     ICD-10-CM    1. Left shoulder pain, unspecified chronicity  M25.512       2. Right shoulder pain, unspecified chronicity  M25.511       3. Generalized weakness  R53.1                      Assessment  Assessment details: Kristen Maguire is a 88 y.o. female who presents with bilateral shoulder pain and weakness of bilateral LE. Pt has a significant medical history and multiple hospitalizations as of recent. Pt is currently experiencing flank pain addressed at most recent ED visit  and urinary incontinence. Pt and care-giver instructed to contact PCP regarding bowel and bladder dysfunction. On evaluation, pt demonstrates impaired shoulder AROM, PROM, and strength and impaired BLE weakness, transfers, gait, and balance. Due to these impairments, patient has difficulty reaching overhead, with STS, bed mobility and ambulation affecting her ability to ambulate in her home/outside and perform ADLs without pain and fear of falling. Patient's clinical presentation is consistent with their referring diagnosis of Left shoulder pain, unspecified chronicity  (primary encounter diagnosis), Right shoulder pain, unspecified chronicity, and Generalized weakness. Evaluation limited today due to patient's late arrival, however, pt will benefit from further evaluation next visit. Patient has been educated in pathology, review of impairements, prognosis, activity modification, POC, and HEP. Patient would benefit from skilled physical therapy services to address their aforementioned functional limitations and progress towards prior level of function and independence with home exercise program.     Impairments: abnormal gait, abnormal or restricted ROM, abnormal movement, activity intolerance, impaired balance, impaired physical strength, lacks appropriate  home exercise program, safety issue, poor posture  and poor body mechanics  Understanding of Dx/Px/POC: good   Prognosis: good    Goals  Short term goals to be achieved by 4 weeks: 2/20/2024  STG1: Patient will be independent with simple HEP to maximize progress between therapy sessions  STG2: Patient will improve BLE strength to no less than 4+/5 for improved 5X STS.   STG3: Pt will demo functional shoulder ER reach to C7 bilaterally for improved ability to reach behind head for bathing and to put on seat belt.   STG4: Patient will improve TUG by 3 seconds to demo decreased risk for falls.    Long Term Goals to be achieved by 12 weeks: 4/16/2024  LTG1: Patient will be independent with updated HEP to demo progress toward discharge to HEP.   LTG2: Patient will improve 5X STS to 14.8 sec w/ BUE assist to demo improved BLE strength necessary for safe transfers chair to RW.   LTG3: Pt will demo B/L shoulder flex and abd AROM of at least 80 degrees to perform ADLs at chest height.    LTG4: Patient will improve TUG to less than 20 seconds (w/ RW + BUE assist for transfer) to return to independence with basic transfers.  LTG4: Pt will report RPE of no greater than 3/10 w/ 5xSTS to demo improved activity tolerance necessary for transfers, dressing, and ambulation in home.     Plan  Plan details: HEP development, stretching, strengthening, A/AA/PROM, joint mobilizations, posture education, STM/MI as needed to reduce muscle tension, muscle reeducation, balance training, transfer training, gait training, and safe AD utilization training. PLOC discussed and agreed upon with patient.   Patient would benefit from: PT eval and skilled physical therapy  Planned modality interventions: cryotherapy and thermotherapy: hydrocollator packs  Planned therapy interventions: balance/weight bearing training, coordination, gait training, home exercise program, therapeutic exercise, therapeutic activities, strengthening, patient education,  neuromuscular re-education, abdominal trunk stabilization, activity modification, manual therapy, balance, functional ROM exercises and transfer training  Frequency: 2-3x/week.  Plan of Care beginning date: 1/23/2024  Plan of Care expiration date: 4/16/2024  Treatment plan discussed with: patient        Subjective Evaluation    History of Present Illness  Mechanism of injury: Pt is a 88 y.o. female who presents to PT with complaint of BLE weakness; pt ambulates with RW at baseline. Pt is a poor historian and care-giver (Clari) was not present for evaluation. Care-giver provides little insight at end of session. Kristen is responsible for her own medical management as per care-giver's report including scheduling doctors appointments. Pt states that she had pain in her hand one night. She prolonged calling ambulance; after trying to sleep she realized she needed to call ED. She was told to turn the light on for the ambulance, while walking in house to prepare for transfer to ER, she passed out and fell onto B/L knees. At hospital she came to find out that she had gout. Pt then was in inpatient rehab for two months where she was ambulating with WC. At end of October, pt had been home about three days and had to call 911 d/t paleness which was due to COVID-19. Was in rehab for an additional month. Pt was hospitalized 1/13 due to report of flank pain as per medical chart, however patient reports most recent hospitalization being due to COVID-19. Pt states that rib pain began a few weeks ago and does not recall it being addressed in hospital until reminded by PT as read in chart review. Pt recalls having UTI prior to hospital admission. Pt states that she has seen PCP twice since release from rehab. Pt has bilateral shoulder pain in front and back. This began over the summer; she was treated in physical therapy for this, however as of recently her shoulder pain has returned (R pain > L). Pt states that she has weak hands.  Pt has pain in bilateral knees; she has hx of bilateral knee replacement. She has left foot pain which she thinks is d/t gout. She feels unsteady while walking. Pt does not drive; she has a . Pt states that she does not have a caregiver . She has a ramp to enter home. She has family in [facility]. Pt has difficulty reaching arms overhead, standing from a chair, walking getting in and out of bed, and using her hands as they are stiff in the morning.     CT abdomen  Impression:   1. Moderate sized fat-containing hernia inferior to the umbilicus with scattered infiltration of fat and mild fluid. Incarcerated hernia is not excluded.  Correlate with site of pain.  2. Moderate distention of the partially visualized distal esophagus which is fluid-filled. Question related to reflux or esophageal dysfunction.  3. 4.3 cm hypodense area in the left lobe of the liver posteriorly indeterminate. Numerous cystic areas in the pancreas. Numerous renal cystic lesions bilaterally some of which appear complex warranting further evaluation. Next best imaging test for this   patient would likely be CT with and without IV contrast renal protocol on a nonemergent basis.    Chest x-ray :   Suspected developing peripheral infiltrate left midlung    Patient Goals  Patient goals for therapy: increased strength, decreased pain, improved balance, increased motion and independence with ADLs/IADLs  Patient goal: to become more limber and walk further without feeling exhausted.  Pain  Current pain ratin (Shoulder pain 0/10 as she took tylenol last night)    Social Support  Steps to enter house: yes (Has ramp access)  5    Treatments  Previous treatment: physical therapy  Discharged from (in last 30 days): skilled nursing facility        Objective    RESTING VITALS:  2024:  /70 mmHg  HR 60 BPM *Pacemaker*    AROM: sitting   R   L      2024  Shoulder flexion  40*   80  Shoulder  abduction  52*   70  Functional sh. ER  Occiput*  C7  Functional sh. IR  PSIS   PSIS    PROM: sitting   R   L      1/23/2024 1/23/2024  Shoulder flexion  75*   70*  Shoulder abduction  70 firm EF  80    STRENGTH:    R   L      1/23/2024 1/23/2024    Shoulder flexion  2+/5   2+/5  Shoulder abduction  2+/5   2+/5  Shoulder ER@90  3+/5*   3+/5*  Shoulder IR   3+/5*   3+/5*    Posture:   1/23/2024 Pt's sitting posture is kyphotic. Shoulders are anteriorly rounded. Decreased lumbar lordosis.     Cervical Screen: cervical AROM limitations    1/23/2024  Flexion  Nil   Extension max  R rotation Max*  L rotation Max*  R sidebend mod  L sidebend Mod   Retraction max    Mechanical assessment:  1/23/2024 NT    Special Tests:   1/23/2024: NT     LE Strength: MMT      R  L  Hip flexion:    3+/5  3+/5  Hip extension:   NT  NT  Hip abduction (sit):   4/5  4/5  Knee extension:  4/5  4/5  Knee flexion:    4+/5  4+/5  Ankle dorsiflexion:  4/5  4/5  Ankle plantarflexion:   4/5  4/5    Flexibility:  1/23/2024  Hamstrings: Deferred d/t time restraint  Hip flexors: Deferred d/t time restraint    Function:  1/23/2024:   Gait: decreased stride length, impaired speed, increased WB into RW, increased hip flexion bilaterally, unable to stand erect  Stairs: Deferred d/t time restraint  ADLs: difficulty reaching overhead, STS, bed mobility, ambulation  STS: pt with urinary incontinence on first STS, states that she is recovering from urinary tract infection. Is using diapers; stool has been firm. Pt with recent admission to ED 1/13 where she received CT scan of abdomen.     Sensation  1/23/2024: Deferred d/t time restraint    Coordination  1/23/2024: NT    Cut off scores:  All data taken from [facility] Neuro Section or Rehab Measures    Ashraf: <46/56=falls risk  MDC: 6 pts  Age Norms:  60-69: M - 55   F - 55  70-79: M - 54   F - 53  80-89: M - 53   F - 50 5xSTS: Case et al 2010  MDC: 2.3 sec  Age Norms:  60-69: 11.1 sec  70-79: 12.6 sec  80-89: 14.8  sec   TUG  MDC: 4.14 sec  Cut off score:  >13.5 sec community dwelling adults  >32.2 sec frail elderly  <20 sec: I for basic transfers  >30: dependent for transfers 10 Meter Walk Test Norms: Kath and Jose et al 2011  20-29: M - 1.35 m/s   F - 1.34 m/s  30-39: M - 1.43 m/s   F - 1.34 m/s  40-49: M - 1.43 m/s   F - 1.39 m/s  50-59: M - 1.43 m/s   F - 1.31 m/s  60-69: M - 1.34 m/s   F - 1.24 m/s  70-79: M - 1.26 m/s   F - 1.13 m/s  80-89: M - 0.97 m/s   F - 0.94 m/s   FGA  MCID: 4 pts  Geriatrics/community < 22/30 fall risk  Geriatrics/community < 20/30 unexplained falls DGI  MDC: vestibular - 4 pts  MDC: geriatric/community - 3 pts  Falls risk <19/24   6 Minute Walk Test  Age Norms  60-69: M - 1876 ft (571.80 m)  F - 1765 ft (537.98 m)  70-79: M - 1729 ft (527.00 m)  F - 1545 ft (470.92 m)  80-89: M - 1368 ft (416.97 m)  F - 1286 ft (391.97 m) mCTSIB  Norm: 20-60 yrs  Eyes open firm: norm sway 0.21-0.48  Eyes closed firm: norm sway 0.48-0.99  Eyes open foam: norm sway 0.38-0.71  Eyes closed foam: norm sway 0.70-2.22       Outcome Measures Initial Eval  1/23/2024        5xSTS 31 sec BUE; RPE 8/10        TUG 61 sec BUE RW        10 meter (Deferred) ft/sec  (Deferred)  m/s        GRAHAM (Deferred) /56        FGA (Deferred) /30        DGI (Deferred) /24        mCTSIB  - FTEO (firm)  - FTEC (firm)  - FTEO (foam)  - FTEC (foam) (Deferred)         6MWT (Deferred)  meters         ABC         DHI                  Precautions:   *PACEMAKER*  *FALL RISK*    Referral: Help w/ frozen shoulder and help strengthen upper and lower body  Past Medical History:   Diagnosis Date    Cardiac disease     Lyme disease      Past Surgical History:   Procedure Laterality Date    CARDIAC PACEMAKER PLACEMENT      CHOLECYSTECTOMY      HYSTERECTOMY      JOINT REPLACEMENT           SOC: 1/23/2024  FOTO: 1/23/2024  POC Expiration: 4/16/2024   Daily Treatment Log  Date: Initial Evaluation Next session         Visit#/ auth: 1           Objective  Measures                           Manuals                                                                     Neuro Re-Ed                                                                                                               Ther Ex                                                                                                               EDUCATION: Pathology, review of impairements, prognosis, activity modification, POC, and HEP KE: HEP deferred d/t time restraint as pt arrived late to session           Ther Activity                                         Gait Training                                         Modalities                                         HEP:   Add next visit

## 2024-01-23 NOTE — LETTER
2024    Mary Fajardo MD  [address]   [address]  [facility] [address]    Patient: Kristen Maguire   YOB: 1935   Date of Visit: 2024     Encounter Diagnosis     ICD-10-CM    1. Left shoulder pain, unspecified chronicity  M25.512       2. Right shoulder pain, unspecified chronicity  M25.511       3. Generalized weakness  R53.1           Dear Dr. Fajardo:    Thank you for your recent referral of Kristen Maguire. Please review the attached evaluation summary from Kristen's recent visit.     Please verify that you agree with the plan of care by signing the attached order.     If you have any questions or concerns, please do not hesitate to call.     I sincerely appreciate the opportunity to share in the care of one of your patients and hope to have another opportunity to work with you in the near future.       Sincerely,    Radha Castro, PT      Referring Provider:      I certify that I have read the below Plan of Care and certify the need for these services furnished under this plan of treatment while under my care.                    Mary Fajardo MD  [address]  [facility] [address]  Via Mail          PT Evaluation     Today's date: 2024  Patient name: Kristen Maguire  : 1935  MRN: [id number]  Referring provider: Mary Fajardo MD  Dx:   Encounter Diagnosis     ICD-10-CM    1. Left shoulder pain, unspecified chronicity  M25.512       2. Right shoulder pain, unspecified chronicity  M25.511       3. Generalized weakness  R53.1                      Assessment  Assessment details: Kristen Maguire is a 88 y.o. female who presents with bilateral shoulder pain and weakness of bilateral LE. Pt has a significant medical history and multiple hospitalizations as of recent. Pt is currently experiencing flank pain addressed at most recent ED visit  and urinary incontinence. Pt and care-giver instructed to contact PCP regarding bowel and bladder  dysfunction. On evaluation, pt demonstrates impaired shoulder AROM, PROM, and strength and impaired BLE weakness, transfers, gait, and balance. Due to these impairments, patient has difficulty reaching overhead, with STS, bed mobility and ambulation affecting her ability to ambulate in her home/outside and perform ADLs without pain and fear of falling. Patient's clinical presentation is consistent with their referring diagnosis of Left shoulder pain, unspecified chronicity  (primary encounter diagnosis), Right shoulder pain, unspecified chronicity, and Generalized weakness. Evaluation limited today due to patient's late arrival, however, pt will benefit from further evaluation next visit. Patient has been educated in pathology, review of impairements, prognosis, activity modification, POC, and HEP. Patient would benefit from skilled physical therapy services to address their aforementioned functional limitations and progress towards prior level of function and independence with home exercise program.     Impairments: abnormal gait, abnormal or restricted ROM, abnormal movement, activity intolerance, impaired balance, impaired physical strength, lacks appropriate home exercise program, safety issue, poor posture  and poor body mechanics  Understanding of Dx/Px/POC: good   Prognosis: good    Goals  Short term goals to be achieved by 4 weeks: 2/20/2024  STG1: Patient will be independent with simple HEP to maximize progress between therapy sessions  STG2: Patient will improve BLE strength to no less than 4+/5 for improved 5X STS.   STG3: Pt will demo functional shoulder ER reach to C7 bilaterally for improved ability to reach behind head for bathing and to put on seat belt.   STG4: Patient will improve TUG by 3 seconds to demo decreased risk for falls.    Long Term Goals to be achieved by 12 weeks: 4/16/2024  LTG1: Patient will be independent with updated HEP to demo progress toward discharge to HEP.   LTG2: Patient  will improve 5X STS to 14.8 sec w/ BUE assist to demo improved BLE strength necessary for safe transfers chair to RW.   LTG3: Pt will demo B/L shoulder flex and abd AROM of at least 80 degrees to perform ADLs at chest height.    LTG4: Patient will improve TUG to less than 20 seconds (w/ RW + BUE assist for transfer) to return to independence with basic transfers.  LTG4: Pt will report RPE of no greater than 3/10 w/ 5xSTS to demo improved activity tolerance necessary for transfers, dressing, and ambulation in home.     Plan  Plan details: HEP development, stretching, strengthening, A/AA/PROM, joint mobilizations, posture education, STM/MI as needed to reduce muscle tension, muscle reeducation, balance training, transfer training, gait training, and safe AD utilization training. PLOC discussed and agreed upon with patient.   Patient would benefit from: PT eval and skilled physical therapy  Planned modality interventions: cryotherapy and thermotherapy: hydrocollator packs  Planned therapy interventions: balance/weight bearing training, coordination, gait training, home exercise program, therapeutic exercise, therapeutic activities, strengthening, patient education, neuromuscular re-education, abdominal trunk stabilization, activity modification, manual therapy, balance, functional ROM exercises and transfer training  Frequency: 2-3x/week.  Plan of Care beginning date: 1/23/2024  Plan of Care expiration date: 4/16/2024  Treatment plan discussed with: patient        Subjective Evaluation    History of Present Illness  Mechanism of injury: Pt is a 88 y.o. female who presents to PT with complaint of BLE weakness; pt ambulates with RW at baseline. Pt is a poor historian and care-giver (Clari) was not present for evaluation. Care-giver provides little insight at end of session. Kristen is responsible for her own medical management as per care-giver's report including scheduling doctors appointments. Pt states that she had  pain in her hand one night. She prolonged calling ambulance; after trying to sleep she realized she needed to call ED. She was told to turn the light on for the ambulance, while walking in house to prepare for transfer to ER, she passed out and fell onto B/L knees. At hospital she came to find out that she had gout. Pt then was in inpatient rehab for two months where she was ambulating with WC. At end of October, pt had been home about three days and had to call 911 d/t paleness which was due to COVID-19. Was in rehab for an additional month. Pt was hospitalized 1/13 due to report of flank pain as per medical chart, however patient reports most recent hospitalization being due to COVID-19. Pt states that rib pain began a few weeks ago and does not recall it being addressed in hospital until reminded by PT as read in chart review. Pt recalls having UTI prior to hospital admission. Pt states that she has seen PCP twice since release from rehab. Pt has bilateral shoulder pain in front and back. This began over the summer; she was treated in physical therapy for this, however as of recently her shoulder pain has returned (R pain > L). Pt states that she has weak hands. Pt has pain in bilateral knees; she has hx of bilateral knee replacement. She has left foot pain which she thinks is d/t gout. She feels unsteady while walking. Pt does not drive; she has a . Pt states that she does not have a caregiver 24/7. She has a ramp to enter home. She has family in [facility]. Pt has difficulty reaching arms overhead, standing from a chair, walking getting in and out of bed, and using her hands as they are stiff in the morning.     CT abdomen 1/13 Impression:   1. Moderate sized fat-containing hernia inferior to the umbilicus with scattered infiltration of fat and mild fluid. Incarcerated hernia is not excluded.  Correlate with site of pain.  2. Moderate distention of the partially visualized distal esophagus which is  fluid-filled. Question related to reflux or esophageal dysfunction.  3. 4.3 cm hypodense area in the left lobe of the liver posteriorly indeterminate. Numerous cystic areas in the pancreas. Numerous renal cystic lesions bilaterally some of which appear complex warranting further evaluation. Next best imaging test for this   patient would likely be CT with and without IV contrast renal protocol on a nonemergent basis.    Chest x-ray :   Suspected developing peripheral infiltrate left midlung    Patient Goals  Patient goals for therapy: increased strength, decreased pain, improved balance, increased motion and independence with ADLs/IADLs  Patient goal: to become more limber and walk further without feeling exhausted.  Pain  Current pain ratin (Shoulder pain 0/10 as she took tylenol last night)    Social Support  Steps to enter house: yes (Has ramp access)  5    Treatments  Previous treatment: physical therapy  Discharged from (in last 30 days): skilled nursing facility        Objective    RESTING VITALS:  2024:  /70 mmHg  HR 60 BPM *Pacemaker*    AROM: sitting   R   L      2024  Shoulder flexion  40*   80  Shoulder abduction  52*   70  Functional sh. ER  Occiput*  C7  Functional sh. IR  PSIS   PSIS    PROM: sitting   R   L      2024  Shoulder flexion  75*   70*  Shoulder abduction  70 firm EF  80    STRENGTH:    R   L      2024    Shoulder flexion  2+/5   2+/5  Shoulder abduction  2+/5   2+/5  Shoulder ER@90  3+/5*   3+/5*  Shoulder IR   3+/5*   3+/5*    Posture:   2024 Pt's sitting posture is kyphotic. Shoulders are anteriorly rounded. Decreased lumbar lordosis.     Cervical Screen: cervical AROM limitations    2024  Flexion  Nil   Extension max  R rotation Max*  L rotation Max*  R sidebend mod  L sidebend Mod   Retraction max    Mechanical assessment:  2024 NT    Special Tests:   2024: NT     LE Strength: MMT      R  L  Hip  flexion:    3+/5  3+/5  Hip extension:   NT  NT  Hip abduction (sit):   4/5  4/5  Knee extension:  4/5  4/5  Knee flexion:    4+/5  4+/5  Ankle dorsiflexion:  4/5  4/5  Ankle plantarflexion:   4/5  4/5    Flexibility:  1/23/2024  Hamstrings: Deferred d/t time restraint  Hip flexors: Deferred d/t time restraint    Function:  1/23/2024:   Gait: decreased stride length, impaired speed, increased WB into RW, increased hip flexion bilaterally, unable to stand erect  Stairs: Deferred d/t time restraint  ADLs: difficulty reaching overhead, STS, bed mobility, ambulation  STS: pt with urinary incontinence on first STS, states that she is recovering from urinary tract infection. Is using diapers; stool has been firm. Pt with recent admission to ED 1/13 where she received CT scan of abdomen.     Sensation  1/23/2024: Deferred d/t time restraint    Coordination  1/23/2024: NT    Cut off scores:  All data taken from [employer] Neuro Section or Rehab Measures    Ashraf: <46/56=falls risk  MDC: 6 pts  Age Norms:  60-69: M - 55   F - 55  70-79: M - 54   F - 53  80-89: M - 53   F - 50 5xSTS: Case et al 2010  MDC: 2.3 sec  Age Norms:  60-69: 11.1 sec  70-79: 12.6 sec  80-89: 14.8 sec   TUG  MDC: 4.14 sec  Cut off score:  >13.5 sec community dwelling adults  >32.2 sec frail elderly  <20 sec: I for basic transfers  >30: dependent for transfers 10 Meter Walk Test Norms: Kath and Jose et al 2011  20-29: M - 1.35 m/s   F - 1.34 m/s  30-39: M - 1.43 m/s   F - 1.34 m/s  40-49: M - 1.43 m/s   F - 1.39 m/s  50-59: M - 1.43 m/s   F - 1.31 m/s  60-69: M - 1.34 m/s   F - 1.24 m/s  70-79: M - 1.26 m/s   F - 1.13 m/s  80-89: M - 0.97 m/s   F - 0.94 m/s   FGA  MCID: 4 pts  Geriatrics/community < 22/30 fall risk  Geriatrics/community < 20/30 unexplained falls DGI  MDC: vestibular - 4 pts  MDC: geriatric/community - 3 pts  Falls risk <19/24   6 Minute Walk Test  Age Norms  60-69: M - 1876 ft (571.80 m)  F - 1765 ft (537.98 m)  70-79: M - 1729 ft  (527.00 m)  F - 1545 ft (470.92 m)  80-89: M - 1368 ft (416.97 m)  F - 1286 ft (391.97 m) mCTSIB  Norm: 20-60 yrs  Eyes open firm: norm sway 0.21-0.48  Eyes closed firm: norm sway 0.48-0.99  Eyes open foam: norm sway 0.38-0.71  Eyes closed foam: norm sway 0.70-2.22       Outcome Measures Initial Eval  1/23/2024        5xSTS 31 sec BUE; RPE 8/10        TUG 61 sec BUE RW        10 meter (Deferred) ft/sec  (Deferred)  m/s        GRAHAM (Deferred) /56        FGA (Deferred) /30        DGI (Deferred) /24        mCTSIB  - FTEO (firm)  - FTEC (firm)  - FTEO (foam)  - FTEC (foam) (Deferred)         6MWT (Deferred)  meters         ABC         DHI                  Precautions:   *PACEMAKER*  *FALL RISK*    Referral: Help w/ frozen shoulder and help strengthen upper and lower body  Past Medical History:   Diagnosis Date   • Cardiac disease    • Lyme disease      Past Surgical History:   Procedure Laterality Date   • CARDIAC PACEMAKER PLACEMENT     • CHOLECYSTECTOMY     • HYSTERECTOMY     • JOINT REPLACEMENT           SOC: 1/23/2024  FOTO: 1/23/2024  POC Expiration: 4/16/2024   Daily Treatment Log  Date: Initial Evaluation Next session         Visit#/ auth: 1           Objective Measures                           Manuals                                                                     Neuro Re-Ed                                                                                                               Ther Ex                                                                                                               EDUCATION: Pathology, review of impairements, prognosis, activity modification, POC, and HEP KE: HEP deferred d/t time restraint as pt arrived late to session           Ther Activity                                         Gait Training                                         Modalities                                         HEP:   Add next visit

## 2024-01-25 ENCOUNTER — OFFICE VISIT (OUTPATIENT)
Dept: PHYSICAL THERAPY | Facility: CLINIC | Age: 89
End: 2024-01-25
Payer: MEDICARE

## 2024-01-25 DIAGNOSIS — M25.611 SHOULDER STIFFNESS, RIGHT: ICD-10-CM

## 2024-01-25 DIAGNOSIS — M25.511 RIGHT SHOULDER PAIN, UNSPECIFIED CHRONICITY: ICD-10-CM

## 2024-01-25 DIAGNOSIS — R53.1 GENERALIZED WEAKNESS: ICD-10-CM

## 2024-01-25 DIAGNOSIS — M25.512 LEFT SHOULDER PAIN, UNSPECIFIED CHRONICITY: Primary | ICD-10-CM

## 2024-01-25 PROCEDURE — 97110 THERAPEUTIC EXERCISES: CPT

## 2024-01-25 PROCEDURE — 97112 NEUROMUSCULAR REEDUCATION: CPT

## 2024-01-25 NOTE — PROGRESS NOTES
Daily Note     Today's date: 2024  Patient name: Kristne Maguire  : 1935  MRN: [id number]  Referring provider: Mary Fajardo MD  Dx:   Encounter Diagnosis     ICD-10-CM    1. Left shoulder pain, unspecified chronicity  M25.512       2. Right shoulder pain, unspecified chronicity  M25.511       3. Generalized weakness  R53.1       4. Shoulder stiffness, right  M25.611                      Subjective: Pt was dropped off to PT by caregiver. Pt states that she may not have a ride to scheduled appointments because her care-giver will not be able to. Last time she was driving was August; states she might just walk to therapy. States that her care-giver was angry with her because she has a particular way about doing things at home so pt went in her room and read the entire day. Pt states that she did have home PT scheduled for 6 weeks but it was cut short. Pt did not appreciate home nursing because they never came on a consistent schedule. Last home PT visit was 1 month ago; states that they recommended a different home therapy program. Caregiver clarified at end of session that she prefers pt have outpatient therapy as she wants her to have social interaction and she is doing great at home (dressing independently/ walking safely w/ RW).  Caregiver clarifies at end of session that patient does have transportation. Pt was surprised with episode of urinary incontinence last PT session. States that she thinks she is still recovering from UTI. She is still experiencing itching. She hasn't been consistent applying her prescribed ointment. Her rib pain is resolving. She scheduled primary care appointment next Thursday as instructed last PT visit. States that her bowel movements are regular but difficult due to sensation of constipation; she has prune juice everyday. She says that her systolic BP is usually 120. She says that she was on a pill for high blood pressure but her BP was low for a while so her  care-giver discontinued it. She says she feels like she is going to pass out frequently and it has been going on for a while; her hospitalization in October was due to passing out. She is current taking prescription medication for dx of gout.         Objective: See treatment diary below    Function:    Standing tolerance: 1 minutes w/ RW   Walking tolerance: 1 block w/ RW    Assessment: Exercise limited due to impaired activity tolerance and pt report of feeling faint with balance assessment. Vitals assessed; SpO2 range 90% to 96% with rest. Pt educated on importance of discussing current symptoms with PCP. Instructed to monitor symptoms and present to urgent care/ER if symptoms persist/ worsen; caregiver educated as well. Pt demonstrates impaired balance of examination. Educated on safe AD utilization. Tolerated treatment fair. Patient would benefit from continued PT.       Plan: Initiate seated exercise next session for improved tolerance to activity next session. Continue per plan of care.  Progress treatment as tolerated.       Precautions:   *PACEMAKER*  *FALL RISK*    Referral: Help w/ frozen shoulder and help strengthen upper and lower body  Past Medical History:   Diagnosis Date    Cardiac disease     Lyme disease      Past Surgical History:   Procedure Laterality Date    CARDIAC PACEMAKER PLACEMENT      CHOLECYSTECTOMY      HYSTERECTOMY      JOINT REPLACEMENT           Outcome Measures Initial Eval  1/23/2024        5xSTS 31 sec BUE; RPE 8/10        TUG 61 sec BUE RW        10 meter (Deferred) ft/sec  (Deferred)  m/s        GRAHAM (Deferred) /56        FGA (Deferred) /30        DGI (Deferred) /24        mCTSIB  - FTEO (firm)  - FTEC (firm)  - FTEO (foam)  - FTEC (foam) 1/25/2024  50 sec mod sway  25 seconds max sway   NT  NT        6MWT (Deferred)  meters         ABC         DHI             SOC: 1/23/2024  FOTO: 1/23/2024  POC Expiration: 4/16/2024   Daily Treatment Log  Date: Initial Evaluation 1/25/2024      "    Visit#/ auth: 1  2         Objective Measures             Vitals   Pre tx:  BP: 140/80 mmHg    Mid tx:  BP: 130/70 mmHg  SpO2 90- 96%  HR 66pm    Post tx:  BP: 134/64 mmHg  SpO2 93%  HR 66bpm           mCTSIB  KE      Manuals                                                                     Neuro Re-Ed    10'         FT EO/EC (CGA)    60\" x 1 FT EO  25\" x 1 FT EC         Tandem             Backward walking +CS/CGA             Tandem walking +CS/CGA                                                       Ther Ex    30'          LAQ              Seated marching              Nancy Ramirez                                                       EDUCATION: Pathology, review of impairements, prognosis, activity modification, POC, and HEP KE: HEP deferred d/t time restraint as pt arrived late to session KE; monitor symptoms, schedule PCP appt/ discuss symptoms, safe AD utilization         Ther Activity    5'          STS   X4 + VC for safe RW use          Ambulation in clinic             Gait Training                                         Modalities                                         HEP:   Add next visit           "

## 2024-01-29 ENCOUNTER — OFFICE VISIT (OUTPATIENT)
Dept: PHYSICAL THERAPY | Facility: CLINIC | Age: 89
End: 2024-01-29
Payer: MEDICARE

## 2024-01-29 DIAGNOSIS — M25.512 LEFT SHOULDER PAIN, UNSPECIFIED CHRONICITY: Primary | ICD-10-CM

## 2024-01-29 DIAGNOSIS — M25.511 RIGHT SHOULDER PAIN, UNSPECIFIED CHRONICITY: ICD-10-CM

## 2024-01-29 DIAGNOSIS — R53.1 GENERALIZED WEAKNESS: ICD-10-CM

## 2024-01-29 PROCEDURE — 97110 THERAPEUTIC EXERCISES: CPT

## 2024-01-29 PROCEDURE — 97530 THERAPEUTIC ACTIVITIES: CPT

## 2024-01-29 NOTE — PROGRESS NOTES
Daily Note     Today's date: 2024  Patient name: Kristen Maguire  : 1935  MRN: [id number]  Referring provider: Mary Fajardo MD  Dx:   Encounter Diagnosis     ICD-10-CM    1. Left shoulder pain, unspecified chronicity  M25.512       2. Right shoulder pain, unspecified chronicity  M25.511       3. Generalized weakness  R53.1                      Subjective: Pt has scheduled PCP appointment as per discussion in PT. Pt has a list of topics to discuss with PCP this Thursday. Pt has had a few pains in her ribs that were very brief. Pt states that she still has symptoms discussed last PT visit described as feeling as though she may pass out. She thinks her urinary tract infection has returned.       Objective: See treatment diary below      Assessment: Pt demonstrated improved standing tolerance today. Able to perform entire session w/out complaint of pain/ issues. Adjusted RW height which improved patients gait mechanics allowing her to increase extension at thoracic and lumbar spine. HEP updated and provided. Pt instructed to perform STS with supervision of care-giver d/t fatigue with this activity. Tolerated treatment well. Patient would benefit from continued PT.       Plan: Continue per plan of care.  Progress treatment as tolerated.       Precautions:   *PACEMAKER*  *FALL RISK*    Referral: Help w/ frozen shoulder and help strengthen upper and lower body  Past Medical History:   Diagnosis Date    Cardiac disease     Lyme disease      Past Surgical History:   Procedure Laterality Date    CARDIAC PACEMAKER PLACEMENT      CHOLECYSTECTOMY      HYSTERECTOMY      JOINT REPLACEMENT           Outcome Measures Initial Eval  2024        5xSTS 31 sec BUE; RPE 8/10        TUG 61 sec BUE RW        10 meter (Deferred) ft/sec  (Deferred)  m/s        GRAHAM (Deferred) /56        FGA (Deferred) /30        DGI (Deferred) /24        mCTSIB  - FTEO (firm)  - FTEC (firm)  - FTEO (foam)  - FTEC (foam) 2024  50  "sec mod sway  25 seconds max sway   NT  NT        6MWT (Deferred)  meters         ABC         DHI             SOC: 1/23/2024  FOTO: 1/23/2024  POC Expiration: 4/16/2024   Daily Treatment Log  Date: Initial Evaluation 1/25/2024 1/29/2024       Visit#/ auth: 1  2  3       Objective Measures             Vitals   Pre tx:  BP: 140/80 mmHg    Mid tx:  BP: 130/70 mmHg  SpO2 90- 96%  HR 66pm    Post tx:  BP: 134/64 mmHg  SpO2 93%  HR 66bpm   Pre tx  /70 mmHg    Mid tx:   SpO2 92-93%   BP: 150/70 mmHg    Post-tx  BP: 130/62 mmHg  HR: 62 bpm  SpO2: 94%           mCTSIB  KE      Manuals                                                                     Neuro Re-Ed    10'         FT EO/EC (CGA)    60\" x 1 FT EO  25\" x 1 FT EC         Tandem             Backward walking +CS/CGA             Tandem walking +CS/CGA                                                       Ther Ex    30'  30'        LAQ      2 x 10 R/L         Seated marching      2 x 10 R/L        Clamshell hook-lying HOB elevated     1 x 10 R/L   1 x 10 R/L YTB        Bridges     Glute set 5\" x 10 supine HOB elevated                     Shoulder Cane ROM            Scapular retraction   1 x 10     Seated Ws   1 x 10 w/ VC for form                   EDUCATION: Pathology, review of impairements, prognosis, activity modification, POC, and HEP KE: HEP deferred d/t time restraint as pt arrived late to session KE; monitor symptoms, schedule PCP appt/ discuss symptoms, safe AD utilization  RW height adjusted; HEP provided       Ther Activity    5'  15'        STS   X4 + VC for safe RW use X5 + CS        Ambulation in clinic     50 ft x1 RW +CS  100ft x 1 RW + CS       Gait Training                                         Modalities                                         HEP:   Access Code: [email or web address]  URL: [email or web address]  Date: 01/29/2024  Prepared by: Radha Horan  - Seated Long Arc Quad  - 2 x daily - 1 sets - 10 reps  - Seated " March  - 2 x daily - 1 sets - 10 reps  - Sit to Stand with Counter Support  - 2 x daily - 1 sets - 3 reps

## 2024-01-31 ENCOUNTER — APPOINTMENT (OUTPATIENT)
Dept: PHYSICAL THERAPY | Facility: CLINIC | Age: 89
End: 2024-01-31
Payer: MEDICARE

## 2024-02-01 ENCOUNTER — OFFICE VISIT (OUTPATIENT)
Dept: PHYSICAL THERAPY | Facility: CLINIC | Age: 89
End: 2024-02-01
Payer: MEDICARE

## 2024-02-01 DIAGNOSIS — M25.611 SHOULDER STIFFNESS, RIGHT: ICD-10-CM

## 2024-02-01 DIAGNOSIS — R53.1 GENERALIZED WEAKNESS: ICD-10-CM

## 2024-02-01 DIAGNOSIS — M25.512 LEFT SHOULDER PAIN, UNSPECIFIED CHRONICITY: Primary | ICD-10-CM

## 2024-02-01 DIAGNOSIS — M25.511 RIGHT SHOULDER PAIN, UNSPECIFIED CHRONICITY: ICD-10-CM

## 2024-02-01 PROCEDURE — 97112 NEUROMUSCULAR REEDUCATION: CPT

## 2024-02-01 PROCEDURE — 97110 THERAPEUTIC EXERCISES: CPT

## 2024-02-01 PROCEDURE — 97530 THERAPEUTIC ACTIVITIES: CPT

## 2024-02-01 NOTE — PROGRESS NOTES
Daily Note     Today's date: 2024  Patient name: Kristen Maguire  : 1935  MRN: [id number]  Referring provider: Mary Fajardo MD  Dx:   Encounter Diagnosis     ICD-10-CM    1. Left shoulder pain, unspecified chronicity  M25.512       2. Right shoulder pain, unspecified chronicity  M25.511       3. Generalized weakness  R53.1       4. Shoulder stiffness, right  M25.611                      Subjective: Pt arrives 5 min late to session; pt accommodated. Pt states that she went to her PCP, she dicussed UTI, kidney pain, and lightheadedness as instructed last PT visit. MD addressed medication regimen. Pt is tired because she had a long day. Pt required BP be taken on L arm due to R arm pain today.       Objective: See treatment diary below      Assessment: Pt denies issues with exercise progressions today. Pt able to ambulate 100ft x 2 without report of fatigue. Asked pt is she would like rollator height adjusted, pt agreeable. Adjusted to ulnar styloid process and appropriate elbow flex. Pt demonstrates improved gait with adjustment. Tolerated treatment well. Patient would benefit from continued PT.       Plan: Continue per plan of care.  Progress treatment as tolerated.       Precautions:   *PACEMAKER*  *FALL RISK*    Referral: Help w/ frozen shoulder and help strengthen upper and lower body  Past Medical History:   Diagnosis Date    Cardiac disease     Lyme disease      Past Surgical History:   Procedure Laterality Date    CARDIAC PACEMAKER PLACEMENT      CHOLECYSTECTOMY      HYSTERECTOMY      JOINT REPLACEMENT           Outcome Measures Initial Eval  2024        5xSTS 31 sec BUE; RPE 8/10        TUG 61 sec BUE RW        10 meter (Deferred) ft/sec  (Deferred)  m/s        GRAHAM (Deferred) /56        FGA (Deferred) /30        DGI (Deferred) /24        mCTSIB  - FTEO (firm)  - FTEC (firm)  - FTEO (foam)  - FTEC (foam) 2024  50 sec mod sway  25 seconds max sway   NT  NT        6MWT (Deferred)   "meters         [facility]         [facility]             SOC: 1/23/2024  FOTO: 1/23/2024  POC Expiration: 4/16/2024   Daily Treatment Log  Date: Initial Evaluation 1/25/2024 1/29/2024 2/1/2024     Visit#/ auth: 1  2  3  4     Objective Measures             Vitals   Pre tx:  BP: 140/80 mmHg    Mid tx:  BP: 130/70 mmHg  SpO2 90- 96%  HR 66pm    Post tx:  BP: 134/64 mmHg  SpO2 93%  HR 66bpm   Pre tx  /70 mmHg    Mid tx:   SpO2 92-93%   BP: 150/70 mmHg    Post-tx  BP: 130/62 mmHg  HR: 62 bpm  SpO2: 94%     Pre-tx   BP: 143/64  mmHg   HR 60 bpm    Mid session BP:   130/62 mmHg     Post-tx BP: 136/70mmHg     mCTSIB  KE      Manuals                                                                     Neuro Re-Ed    10'    10'     FT EO/EC (CGA)    60\" x 1 FT EO  25\" x 1 FT EC    30\" x 2 FT EC + CGA     Tandem        Semi 30\" x 2 R/L +CGA     Backward walking +CS/CGA             Tandem walking +CS/CGA                                                       Ther Ex    30'  30'  20'      LAQ      2 x 10 R/L  2 x 10 R/L       Seated marching      2 x 10 R/L  2 x 10 R/L      Scapular retraction w/ low row    1 x 10 YTB 2 x 10 YTB    Clamshell hook-lying HOB elevated     1 x 10 R/L   1 x 10 R/L YTB        Bridges     Glute set 5\" x 10 supine HOB elevated                     Shoulder Cane ROM            Scapular retraction   1 x 10     Seated Ws   1 x 10 w/ VC for form                   EDUCATION: Pathology, review of impairements, prognosis, activity modification, POC, and HEP KE: HEP deferred d/t time restraint as pt arrived late to session KE; monitor symptoms, schedule PCP appt/ discuss symptoms, safe AD utilization  RW height adjusted; HEP provided       Ther Activity    5'  15'  10'      STS   X4 + VC for safe RW use X5 + CS  X5 + CS      Ambulation in clinic     50 ft x1 RW +CS  100ft x 1 RW + CS  100ft x 2 rollator +CS     Gait Training                                         Modalities                                         HEP: "   Access Code: [email or web address]  URL: [email or web address]  Date: 01/29/2024  Prepared by: Radha Castro    Exercises  - Seated Long Arc Quad  - 2 x daily - 1 sets - 10 reps  - Seated March  - 2 x daily - 1 sets - 10 reps  - Sit to Stand with Counter Support  - 2 x daily - 1 sets - 3 reps

## 2024-02-05 ENCOUNTER — OFFICE VISIT (OUTPATIENT)
Dept: PHYSICAL THERAPY | Facility: CLINIC | Age: 89
End: 2024-02-05
Payer: MEDICARE

## 2024-02-05 DIAGNOSIS — M25.511 RIGHT SHOULDER PAIN, UNSPECIFIED CHRONICITY: ICD-10-CM

## 2024-02-05 DIAGNOSIS — R53.1 GENERALIZED WEAKNESS: ICD-10-CM

## 2024-02-05 DIAGNOSIS — M25.611 SHOULDER STIFFNESS, RIGHT: ICD-10-CM

## 2024-02-05 DIAGNOSIS — M25.512 LEFT SHOULDER PAIN, UNSPECIFIED CHRONICITY: Primary | ICD-10-CM

## 2024-02-05 PROCEDURE — 97110 THERAPEUTIC EXERCISES: CPT

## 2024-02-05 PROCEDURE — 97112 NEUROMUSCULAR REEDUCATION: CPT

## 2024-02-05 NOTE — PROGRESS NOTES
Daily Note     Today's date: 2024  Patient name: Kristen Maguire  : 1935  MRN: [id number]  Referring provider: Mary Fajardo MD  Dx:   Encounter Diagnosis     ICD-10-CM    1. Left shoulder pain, unspecified chronicity  M25.512       2. Right shoulder pain, unspecified chronicity  M25.511       3. Generalized weakness  R53.1       4. Shoulder stiffness, right  M25.611                      Subjective: Pt states that the home exercises are going well. She does the seated exercises before she gets up to walk around her home which seem to help warm her up. Pt states that her shoulders bother her at night and keep her up at night; Tylenol helps to relieve pain. Pt sleeps on back with head elevated. She puts warm clothing on shoulders which help with pain.       Objective: See treatment diary below      Assessment: Pt unable to tolerate TB resistance with scap set and low row today d/t R shoulder pain. Unable to tolerate supine flex w/ cane due to right shoulder weakness and pain. Pt with report of lightheadedness following high march along length of rail with RW and CGA. Symptoms subsided w/ rest. SpO2 ranging from 89% directly after activity to 95% with rest. Tolerated treatment well. Patient would benefit from continued PT.      Plan: Continue per plan of care.  Progress treatment as tolerated.       Precautions:   *PACEMAKER*  *FALL RISK*    Referral: Help w/ frozen shoulder and help strengthen upper and lower body  Past Medical History:   Diagnosis Date    Cardiac disease     Lyme disease      Past Surgical History:   Procedure Laterality Date    CARDIAC PACEMAKER PLACEMENT      CHOLECYSTECTOMY      HYSTERECTOMY      JOINT REPLACEMENT           Outcome Measures Initial Eval  2024        5xSTS 31 sec BUE; RPE 8/10        TUG 61 sec BUE RW        10 meter (Deferred) ft/sec  (Deferred)  m/s        GRAHAM (Deferred) /56        FGA (Deferred) /30        DGI (Deferred) /24        mCTSIB  - FTEO (firm)  -  "FTEC (firm)  - FTEO (foam)  - FTEC (foam) 1/25/2024  50 sec mod sway  25 seconds max sway   NT  NT        6MWT (Deferred)  meters         ABC         [facility]             SOC: 1/23/2024  FOTO: 1/23/2024  POC Expiration: 4/16/2024   Daily Treatment Log  Date: Next session   1/29/2024 2/1/2024 2/5/2024   Visit#/ auth:    3  4  5   Objective Measures           Vitals   Pre tx  /70 mmHg    Mid tx:   SpO2 92-93%   BP: 150/70 mmHg    Post-tx  BP: 130/62 mmHg  HR: 62 bpm  SpO2: 94%     Pre-tx   BP: 143/64  mmHg   HR 60 bpm    Mid session BP:   130/62 mmHg     Post-tx BP: 136/70mmHg Pre-tx   BP: 130/70  mmHg   HR 73 bpm  SpO2: 91 to 93%    Mid tx:  SpO2: 90-95%    Post -tx BP: 126/64mmHg  SpO2: 89 to 95%  HR: 64 bpm   mCTSIB        Manuals                                                           Neuro Re-Ed      10'  20'   FT EO/EC (CGA)      30\" x 2 FT EC + CGA     Tandem      Semi 30\" x 2 R/L +CGA     Backward walking +CS/CGA       1 lap + CGA and RW   Tandem walking +CS/CGA        1 lap + CGA and RW    High march length of rail         1 lap + CGA and RW                           Ther Ex    30'  20'  25'    LAQ    2 x 10 R/L  2 x 10 R/L   2 x 10 R/L     Seated marching    2 x 10 R/L  2 x 10 R/L  1 x 10 R/L     Supine:   1 x 10 R/L w/ sos   Scapular retraction w/ low row    1 x 10 YTB 2 x 10 YTB 1 x 10 no TB d/t pain   Clamshell hook-lying HOB elevated   1 x 10 R/L   1 x 10 R/L YTB   uni iso 2 x 10 R/L YTB    Bridges   Glute set 5\" x 10 supine HOB elevated   Glute set 5\" x 12 supine HOB elevated               Pulleys     X20 R/L    Shoulder Cane ROM          Scapular retraction   1 x 10     Seated Ws   1 x 10 w/ VC for form                 EDUCATION: Pathology, review of impairements, prognosis, activity modification, POC, and HEP    RW height adjusted; HEP provided       Ther Activity    15'  10'      STS   X5 + CS  X5 + CS      Ambulation in clinic   50 ft x1 RW +CS  100ft x 1 RW + CS  100ft x 2 rollator +CS   "   Gait Training                                   Modalities                                   HEP:   Access Code: [email or web address]  URL: [email or web address]  Date: 01/29/2024  Prepared by: Radha Castro    Exercises  - Seated Long Arc Quad  - 2 x daily - 1 sets - 10 reps  - Seated March  - 2 x daily - 1 sets - 10 reps  - Sit to Stand with Counter Support  - 2 x daily - 1 sets - 3 reps

## 2024-02-06 PROCEDURE — 99284 EMERGENCY DEPT VISIT MOD MDM: CPT

## 2024-02-06 PROCEDURE — NC001 PR NO CHARGE: Performed by: EMERGENCY MEDICINE

## 2024-02-07 ENCOUNTER — APPOINTMENT (EMERGENCY)
Dept: RADIOLOGY | Facility: HOSPITAL | Age: 89
End: 2024-02-07
Payer: MEDICARE

## 2024-02-07 ENCOUNTER — HOSPITAL ENCOUNTER (EMERGENCY)
Facility: HOSPITAL | Age: 89
Discharge: HOME | End: 2024-02-07
Attending: EMERGENCY MEDICINE
Payer: MEDICARE

## 2024-02-07 ENCOUNTER — APPOINTMENT (OUTPATIENT)
Dept: PHYSICAL THERAPY | Facility: CLINIC | Age: 89
End: 2024-02-07
Payer: MEDICARE

## 2024-02-07 VITALS
SYSTOLIC BLOOD PRESSURE: 161 MMHG | RESPIRATION RATE: 18 BRPM | OXYGEN SATURATION: 94 % | DIASTOLIC BLOOD PRESSURE: 71 MMHG | TEMPERATURE: 97.5 F | HEART RATE: 60 BPM

## 2024-02-07 DIAGNOSIS — M19.90 CHRONIC ARTHRITIS: ICD-10-CM

## 2024-02-07 DIAGNOSIS — M25.511 RIGHT SHOULDER PAIN: Primary | ICD-10-CM

## 2024-02-07 DIAGNOSIS — N39.0 UTI (URINARY TRACT INFECTION): ICD-10-CM

## 2024-02-07 LAB
2HR DELTA HS TROPONIN: -2 NG/L
ALBUMIN SERPL BCP-MCNC: 3.5 G/DL (ref 3.5–5)
ALP SERPL-CCNC: 64 U/L (ref 34–104)
ALT SERPL W P-5'-P-CCNC: 6 U/L (ref 7–52)
ANION GAP SERPL CALCULATED.3IONS-SCNC: 9 MMOL/L
AST SERPL W P-5'-P-CCNC: 10 U/L (ref 13–39)
ATRIAL RATE: 60 BPM
BACTERIA UR QL AUTO: ABNORMAL /HPF
BASOPHILS # BLD AUTO: 0.05 THOUSANDS/ÂΜL (ref 0–0.1)
BASOPHILS NFR BLD AUTO: 0 % (ref 0–1)
BILIRUB SERPL-MCNC: 0.4 MG/DL (ref 0.2–1)
BILIRUB UR QL STRIP: NEGATIVE
BUN SERPL-MCNC: 23 MG/DL (ref 5–25)
CALCIUM SERPL-MCNC: 11.6 MG/DL (ref 8.4–10.2)
CARDIAC TROPONIN I PNL SERPL HS: 4 NG/L
CARDIAC TROPONIN I PNL SERPL HS: 6 NG/L
CHLORIDE SERPL-SCNC: 100 MMOL/L (ref 96–108)
CLARITY UR: ABNORMAL
CO2 SERPL-SCNC: 27 MMOL/L (ref 21–32)
COLOR UR: YELLOW
CREAT SERPL-MCNC: 0.93 MG/DL (ref 0.6–1.3)
EOSINOPHIL # BLD AUTO: 0.23 THOUSAND/ÂΜL (ref 0–0.61)
EOSINOPHIL NFR BLD AUTO: 2 % (ref 0–6)
ERYTHROCYTE [DISTWIDTH] IN BLOOD BY AUTOMATED COUNT: 14.8 % (ref 11.6–15.1)
GFR SERPL CREATININE-BSD FRML MDRD: 55 ML/MIN/1.73SQ M
GLUCOSE SERPL-MCNC: 147 MG/DL (ref 65–140)
GLUCOSE UR STRIP-MCNC: NEGATIVE MG/DL
HCT VFR BLD AUTO: 33 % (ref 34.8–46.1)
HGB BLD-MCNC: 10.7 G/DL (ref 11.5–15.4)
HGB UR QL STRIP.AUTO: NEGATIVE
IMM GRANULOCYTES # BLD AUTO: 0.04 THOUSAND/UL (ref 0–0.2)
IMM GRANULOCYTES NFR BLD AUTO: 0 % (ref 0–2)
KETONES UR STRIP-MCNC: ABNORMAL MG/DL
LEUKOCYTE ESTERASE UR QL STRIP: ABNORMAL
LYMPHOCYTES # BLD AUTO: 3.18 THOUSANDS/ÂΜL (ref 0.6–4.47)
LYMPHOCYTES NFR BLD AUTO: 28 % (ref 14–44)
MAGNESIUM SERPL-MCNC: 2.1 MG/DL (ref 1.9–2.7)
MCH RBC QN AUTO: 28.2 PG (ref 26.8–34.3)
MCHC RBC AUTO-ENTMCNC: 32.4 G/DL (ref 31.4–37.4)
MCV RBC AUTO: 87 FL (ref 82–98)
MONOCYTES # BLD AUTO: 1.22 THOUSAND/ÂΜL (ref 0.17–1.22)
MONOCYTES NFR BLD AUTO: 11 % (ref 4–12)
NEUTROPHILS # BLD AUTO: 6.56 THOUSANDS/ÂΜL (ref 1.85–7.62)
NEUTS SEG NFR BLD AUTO: 59 % (ref 43–75)
NITRITE UR QL STRIP: NEGATIVE
NON-SQ EPI CELLS URNS QL MICRO: ABNORMAL /HPF
NRBC BLD AUTO-RTO: 0 /100 WBCS
PH UR STRIP.AUTO: 6.5 [PH]
PLATELET # BLD AUTO: 391 THOUSANDS/UL (ref 149–390)
PMV BLD AUTO: 9.9 FL (ref 8.9–12.7)
POTASSIUM SERPL-SCNC: 3.4 MMOL/L (ref 3.5–5.3)
PR INTERVAL: 268 MS
PROT SERPL-MCNC: 6.5 G/DL (ref 6.4–8.4)
PROT UR STRIP-MCNC: NEGATIVE MG/DL
QRS AXIS: -74 DEGREES
QRSD INTERVAL: 194 MS
QT INTERVAL: 506 MS
QTC INTERVAL: 506 MS
RBC # BLD AUTO: 3.8 MILLION/UL (ref 3.81–5.12)
RBC #/AREA URNS AUTO: ABNORMAL /HPF
SODIUM SERPL-SCNC: 136 MMOL/L (ref 135–147)
SP GR UR STRIP.AUTO: 1.02 (ref 1–1.03)
T WAVE AXIS: 76 DEGREES
URATE SERPL-MCNC: 3.8 MG/DL (ref 2–7.5)
UROBILINOGEN UR QL STRIP.AUTO: 0.2 E.U./DL
VENTRICULAR RATE: 60 BPM
WBC # BLD AUTO: 11.28 THOUSAND/UL (ref 4.31–10.16)
WBC #/AREA URNS AUTO: ABNORMAL /HPF

## 2024-02-07 PROCEDURE — 80053 COMPREHEN METABOLIC PANEL: CPT | Performed by: EMERGENCY MEDICINE

## 2024-02-07 PROCEDURE — 96372 THER/PROPH/DIAG INJ SC/IM: CPT

## 2024-02-07 PROCEDURE — 83735 ASSAY OF MAGNESIUM: CPT | Performed by: EMERGENCY MEDICINE

## 2024-02-07 PROCEDURE — 99285 EMERGENCY DEPT VISIT HI MDM: CPT | Performed by: EMERGENCY MEDICINE

## 2024-02-07 PROCEDURE — 87086 URINE CULTURE/COLONY COUNT: CPT | Performed by: EMERGENCY MEDICINE

## 2024-02-07 PROCEDURE — 36415 COLL VENOUS BLD VENIPUNCTURE: CPT | Performed by: EMERGENCY MEDICINE

## 2024-02-07 PROCEDURE — 84550 ASSAY OF BLOOD/URIC ACID: CPT | Performed by: EMERGENCY MEDICINE

## 2024-02-07 PROCEDURE — 85025 COMPLETE CBC W/AUTO DIFF WBC: CPT | Performed by: EMERGENCY MEDICINE

## 2024-02-07 PROCEDURE — 96361 HYDRATE IV INFUSION ADD-ON: CPT

## 2024-02-07 PROCEDURE — 81001 URINALYSIS AUTO W/SCOPE: CPT | Performed by: EMERGENCY MEDICINE

## 2024-02-07 PROCEDURE — 93005 ELECTROCARDIOGRAM TRACING: CPT

## 2024-02-07 PROCEDURE — 96365 THER/PROPH/DIAG IV INF INIT: CPT

## 2024-02-07 PROCEDURE — 71045 X-RAY EXAM CHEST 1 VIEW: CPT

## 2024-02-07 PROCEDURE — 73030 X-RAY EXAM OF SHOULDER: CPT

## 2024-02-07 PROCEDURE — 84484 ASSAY OF TROPONIN QUANT: CPT | Performed by: EMERGENCY MEDICINE

## 2024-02-07 PROCEDURE — 96375 TX/PRO/DX INJ NEW DRUG ADDON: CPT

## 2024-02-07 RX ORDER — ACETAMINOPHEN 10 MG/ML
1000 INJECTION, SOLUTION INTRAVENOUS ONCE
Status: DISCONTINUED | OUTPATIENT
Start: 2024-02-07 | End: 2024-02-07

## 2024-02-07 RX ORDER — HYDROMORPHONE HCL/PF 1 MG/ML
1 SYRINGE (ML) INJECTION ONCE
Status: COMPLETED | OUTPATIENT
Start: 2024-02-07 | End: 2024-02-07

## 2024-02-07 RX ORDER — LIDOCAINE 50 MG/G
1 PATCH TOPICAL DAILY
Qty: 15 PATCH | Refills: 0 | Status: SHIPPED | OUTPATIENT
Start: 2024-02-07

## 2024-02-07 RX ORDER — CEPHALEXIN 500 MG/1
500 CAPSULE ORAL ONCE
Status: COMPLETED | OUTPATIENT
Start: 2024-02-07 | End: 2024-02-07

## 2024-02-07 RX ORDER — MAGNESIUM SULFATE HEPTAHYDRATE 40 MG/ML
2 INJECTION, SOLUTION INTRAVENOUS ONCE
Status: COMPLETED | OUTPATIENT
Start: 2024-02-07 | End: 2024-02-07

## 2024-02-07 RX ORDER — CEPHALEXIN 500 MG/1
500 CAPSULE ORAL EVERY 8 HOURS SCHEDULED
Qty: 15 CAPSULE | Refills: 0 | Status: SHIPPED | OUTPATIENT
Start: 2024-02-07 | End: 2024-02-12

## 2024-02-07 RX ORDER — METHYLPREDNISOLONE SODIUM SUCCINATE 125 MG/2ML
60 INJECTION, POWDER, LYOPHILIZED, FOR SOLUTION INTRAMUSCULAR; INTRAVENOUS ONCE
Status: COMPLETED | OUTPATIENT
Start: 2024-02-07 | End: 2024-02-07

## 2024-02-07 RX ORDER — PREDNISONE 20 MG/1
20 TABLET ORAL DAILY
Qty: 4 TABLET | Refills: 0 | Status: SHIPPED | OUTPATIENT
Start: 2024-02-07 | End: 2024-02-11

## 2024-02-07 RX ADMIN — MAGNESIUM SULFATE HEPTAHYDRATE 2 G: 40 INJECTION, SOLUTION INTRAVENOUS at 00:21

## 2024-02-07 RX ADMIN — HYDROMORPHONE HYDROCHLORIDE 1 MG: 1 INJECTION, SOLUTION INTRAMUSCULAR; INTRAVENOUS; SUBCUTANEOUS at 10:26

## 2024-02-07 RX ADMIN — HYDROMORPHONE HYDROCHLORIDE 1 MG: 1 INJECTION, SOLUTION INTRAMUSCULAR; INTRAVENOUS; SUBCUTANEOUS at 00:18

## 2024-02-07 RX ADMIN — CEPHALEXIN 500 MG: 500 CAPSULE ORAL at 07:25

## 2024-02-07 RX ADMIN — SODIUM CHLORIDE 1000 ML: 0.9 INJECTION, SOLUTION INTRAVENOUS at 00:17

## 2024-02-07 RX ADMIN — METHYLPREDNISOLONE SODIUM SUCCINATE 60 MG: 125 INJECTION, POWDER, FOR SOLUTION INTRAMUSCULAR; INTRAVENOUS at 00:19

## 2024-02-07 NOTE — ED NOTES
Pt ambulated to restroom via walker with this RN. Pt was able to ambulate to restroom with walker with no issues. MD made aware. Will schedule a stretcher van for pt.     Kylie Gleason RN  02/07/24 0602

## 2024-02-07 NOTE — ED PROVIDER NOTES
Patient feels better    Nothing to acutely intervene on re: xr    She sleeps for about 4 hours    Ambulatory trial performed    Delta trop neg    Defer cards referral as not tech chest pain, pretty atypical and better explained by her chronic osteoarthritis    Already following regularly w/ phys therapy     Wesly Katz MD  02/07/24 8894        While awaiting ride home patient's urine is collected and sent per pending orders from initial physician    Some signs of UTI, urine looks cloudy and foul smelling. No urinary symptoms. Will be cautious and parisa and f/u culture.s        Wesly Katz MD  02/07/24 7219

## 2024-02-07 NOTE — ED PROVIDER NOTES
History  Chief Complaint   Patient presents with    Shoulder Pain     R shoulder pain that began this morning. Took 4 tylenol pta. Pt yelling in triage     88-year-old female with past history of osteoarthritis, gout, GERD, CHF, status post pacemaker placement, Lyme disease, presents to the ED for evaluation of acute onset right shoulder pain that is worsened since this morning.  Patient states that she has had some baseline right shoulder pain.  Patient was seen by physical therapy 2 days ago.  Patient denies any trauma, heavy lifting, injuries, or any falls.  Patient states that she woke up with pain that is increased throughout the evening.  Tylenol is not helping.  Pain has now become unbearable.  Patient lives alone however she does have a caregiver that comes for part of the day.  Patient took 4 Tylenol's earlier this evening with no relief of symptoms.  Subsequently patient called EMS and requested to be brought to the ED for further evaluation.  In the emergency department patient is writhing in pain.  Patient appears to be in moderate distress secondary to pain.  Patient denies any fevers or chills.  Patient denies any cold symptoms.  Patient denies any numbness.  Patient is not sure whether she is having a gouty attack.      History provided by:  Patient  Shoulder Pain  Associated symptoms: no back pain and no fever        Prior to Admission Medications   Prescriptions Last Dose Informant Patient Reported? Taking?   Apixaban (ELIQUIS PO)   Yes No   Sig: Take 5 mg by mouth 2 (two) times a day   acetaminophen (TYLENOL) 325 mg tablet  Outside Facility (Specify) Yes No   Sig: Take 650 mg by mouth every 6 (six) hours as needed for mild pain   albuterol (PROVENTIL HFA,VENTOLIN HFA) 90 mcg/act inhaler   Yes No   bisacodyl (bisacodyl) 5 mg EC tablet   Yes No   Sig: Take 5 mg by mouth daily as needed for constipation   carvedilol (COREG) 6.25 mg tablet   Yes No   Sig: Take 6.25 mg by mouth in the morning    clotrimazole (LOTRIMIN) 1 % cream   No No   Sig: Apply topically 2 (two) times a day   Patient not taking: Reported on 1/13/2024   dextromethorphan-guaiFENesin (ROBITUSSIN DM)  mg/5 mL syrup   No No   Sig: Take 10 mL by mouth every 4 (four) hours as needed for cough or congestion   Patient not taking: Reported on 1/13/2024   docusate sodium (COLACE) 100 mg capsule  Self Yes No   Sig: Take 100 mg by mouth 2 (two) times a day   famotidine (PEPCID) 40 MG tablet  Self Yes No   Sig: Take 40 mg by mouth daily   furosemide (LASIX) 20 mg tablet   Yes No   Sig: Take 20 mg by mouth in the morning   montelukast (SINGULAIR) 10 mg tablet   Yes No   ondansetron (ZOFRAN-ODT) 4 mg disintegrating tablet   No No   Sig: Take 1 tablet (4 mg total) by mouth every 6 (six) hours as needed for nausea for up to 3 days   pantoprazole (PROTONIX) 20 mg tablet   No No   Sig: Take 1 tablet (20 mg total) by mouth daily   risperiDONE (RisperDAL) 0.25 mg tablet  Self Yes No   Sig: Take 0.25 mg by mouth daily   sertraline (ZOLOFT) 25 mg tablet   Yes No   Sig: Take 25 mg by mouth daily      Facility-Administered Medications: None       Past Medical History:   Diagnosis Date    Arthritis     Cardiac disease     Gout     Lyme disease        Past Surgical History:   Procedure Laterality Date    CARDIAC PACEMAKER PLACEMENT      CHOLECYSTECTOMY      HYSTERECTOMY      JOINT REPLACEMENT         No family history on file.  I have reviewed and agree with the history as documented.    E-Cigarette/Vaping    E-Cigarette Use Never User      E-Cigarette/Vaping Substances    Nicotine No     THC No     CBD No     Flavoring No     Other No     Unknown No      Social History     Tobacco Use    Smoking status: Never    Smokeless tobacco: Never   Vaping Use    Vaping status: Never Used   Substance Use Topics    Alcohol use: Never    Drug use: No       Review of Systems   Constitutional:  Negative for chills and fever.   HENT:  Negative for ear pain and sore  throat.    Eyes:  Negative for pain and visual disturbance.   Respiratory:  Negative for cough and shortness of breath.    Cardiovascular:  Negative for chest pain and palpitations.   Gastrointestinal:  Negative for abdominal pain and vomiting.   Genitourinary:  Negative for dysuria and hematuria.   Musculoskeletal:  Positive for arthralgias. Negative for back pain.   Skin:  Negative for color change and rash.   Neurological:  Negative for seizures and syncope.   All other systems reviewed and are negative.      Physical Exam  Physical Exam  Vitals and nursing note reviewed.   Constitutional:       General: She is not in acute distress.     Appearance: She is well-developed.   HENT:      Head: Normocephalic and atraumatic.   Eyes:      Conjunctiva/sclera: Conjunctivae normal.   Cardiovascular:      Rate and Rhythm: Normal rate and regular rhythm.      Heart sounds: No murmur heard.  Pulmonary:      Effort: Pulmonary effort is normal. No respiratory distress.      Breath sounds: Normal breath sounds.   Abdominal:      Palpations: Abdomen is soft.      Tenderness: There is no abdominal tenderness.   Musculoskeletal:         General: No swelling.      Cervical back: Neck supple.      Comments: Pulses intact to bilateral upper extremities.  Tenderness to palpation noted over right shoulder.  No erythema, edema, or obvious bony deformity noted on examination of right shoulder.   strength decreased to right upper extremity secondary to pain.   Skin:     General: Skin is warm and dry.      Capillary Refill: Capillary refill takes less than 2 seconds.   Neurological:      Mental Status: She is alert.   Psychiatric:         Mood and Affect: Mood normal.         Vital Signs  ED Triage Vitals   Temperature Pulse Respirations Blood Pressure SpO2   02/07/24 0002 02/07/24 0002 02/07/24 0002 02/07/24 0002 02/07/24 0002   (!) 96.5 °F (35.8 °C) 60 18 147/63 98 %      Temp Source Heart Rate Source Patient Position - Orthostatic  VS BP Location FiO2 (%)   02/07/24 0002 02/07/24 0002 -- -- --   Temporal Monitor         Pain Score       02/07/24 0017       10 - Worst Possible Pain           Vitals:    02/07/24 0002   BP: 147/63   Pulse: 60         Visual Acuity      ED Medications  Medications   sodium chloride 0.9 % bolus 1,000 mL (1,000 mL Intravenous New Bag 2/7/24 0017)   magnesium sulfate 2 g/50 mL IVPB (premix) 2 g (2 g Intravenous New Bag 2/7/24 0021)   methylPREDNISolone sodium succinate (Solu-MEDROL) injection 60 mg (60 mg Intravenous Given 2/7/24 0019)   HYDROmorphone (DILAUDID) injection 1 mg (1 mg Intravenous Given 2/7/24 0018)       Diagnostic Studies  Results Reviewed       Procedure Component Value Units Date/Time    CBC and differential [[id number]]  (Abnormal) Collected: 02/07/24 0035    Lab Status: Final result Specimen: Blood from Arm, Right Updated: 02/07/24 0042     WBC 11.28 Thousand/uL      RBC 3.80 Million/uL      Hemoglobin 10.7 g/dL      Hematocrit 33.0 %      MCV 87 fL      MCH 28.2 pg      MCHC 32.4 g/dL      RDW 14.8 %      MPV 9.9 fL      Platelets 391 Thousands/uL      nRBC 0 /100 WBCs      Neutrophils Relative 59 %      Immat GRANS % 0 %      Lymphocytes Relative 28 %      Monocytes Relative 11 %      Eosinophils Relative 2 %      Basophils Relative 0 %      Neutrophils Absolute 6.56 Thousands/µL      Immature Grans Absolute 0.04 Thousand/uL      Lymphocytes Absolute 3.18 Thousands/µL      Monocytes Absolute 1.22 Thousand/µL      Eosinophils Absolute 0.23 Thousand/µL      Basophils Absolute 0.05 Thousands/µL     Uric acid [[id number]] Collected: 02/07/24 0035    Lab Status: In process Specimen: Blood from Arm, Right Updated: 02/07/24 0040    Comprehensive metabolic panel [[id number]] Collected: 02/07/24 0035    Lab Status: In process Specimen: Blood from Arm, Right Updated: 02/07/24 0039    Magnesium [[id number]] Collected: 02/07/24 0035    Lab Status: In process Specimen: Blood from Arm, Right Updated: 02/07/24  0039    HS Troponin 0hr (reflex protocol) [[id number]] Collected: 02/07/24 0035    Lab Status: In process Specimen: Blood from Arm, Right Updated: 02/07/24 0039    UA w Reflex to Microscopic w Reflex to Culture [[id number]]     Lab Status: No result Specimen: Urine                    XR shoulder 2+ views RIGHT    (Results Pending)   XR chest 2 views    (Results Pending)              Procedures  ECG 12 Lead Documentation Only    Date/Time: 2/7/2024 12:25 AM    Performed by: Levon Razo DO  Authorized by: Levon Razo DO    Indications / Diagnosis:  Right shoulder pain  ECG reviewed by me, the ED Provider: yes    Patient location:  ED  Previous ECG:     Previous ECG:  Compared to current    Similarity:  No change    Comparison to cardiac monitor: Yes    Interpretation:     Interpretation: abnormal    Comments:      AV dual paced rhythm, left axis deviation, no acute ST/T wave abnormalities noted, rate 60, unchanged from previous study.           ED Course                               SBIRT 20yo+      Flowsheet Row Most Recent Value   Initial Alcohol Screen: US AUDIT-C     1. How often do you have a drink containing alcohol? 0 Filed at: 02/07/2024 0004   Audit-C Score 0 Filed at: 02/07/2024 0004   BERNARDINO: How many times in the past year have you...    Used an illegal drug or used a prescription medication for non-medical reasons? Never Filed at: 02/07/2024 0004                      Medical Decision Making  Obtain blood work, EKG, x-ray of right shoulder, chest x-ray, uric acid level  Give IV fluids, steroids, pain medication and continue to monitor patient for any worsening symptoms.    Lab and radiology studies are pending.  Care of patient signed out to the next attending will follow-up with lab and radiology study as well as reassess patient after medications are given and then dispo patient appropriately.    Amount and/or Complexity of Data Reviewed  Labs: ordered. Decision-making details documented in ED  Course.  Radiology: ordered.  ECG/medicine tests: ordered and independent interpretation performed. Decision-making details documented in ED Course.    Risk  Prescription drug management.             Disposition  Final diagnoses:   Right shoulder pain     Time reflects when diagnosis was documented in both MDM as applicable and the Disposition within this note       Time User Action Codes Description Comment    2/7/2024 12:14 AM Levon Razo Add [M25.511] Right shoulder pain           ED Disposition       None          Follow-up Information    None         Patient's Medications   Discharge Prescriptions    No medications on file       No discharge procedures on file.    PDMP Review       None            ED Provider  Electronically Signed by             Levon Razo DO  02/07/24 0043

## 2024-02-08 ENCOUNTER — OFFICE VISIT (OUTPATIENT)
Dept: PHYSICAL THERAPY | Facility: CLINIC | Age: 89
End: 2024-02-08
Payer: MEDICARE

## 2024-02-08 DIAGNOSIS — M25.611 SHOULDER STIFFNESS, RIGHT: ICD-10-CM

## 2024-02-08 DIAGNOSIS — R53.1 GENERALIZED WEAKNESS: ICD-10-CM

## 2024-02-08 DIAGNOSIS — M25.512 LEFT SHOULDER PAIN, UNSPECIFIED CHRONICITY: Primary | ICD-10-CM

## 2024-02-08 DIAGNOSIS — M25.511 RIGHT SHOULDER PAIN, UNSPECIFIED CHRONICITY: ICD-10-CM

## 2024-02-08 LAB — BACTERIA UR CULT: NORMAL

## 2024-02-08 PROCEDURE — 97110 THERAPEUTIC EXERCISES: CPT

## 2024-02-08 PROCEDURE — 97530 THERAPEUTIC ACTIVITIES: CPT

## 2024-02-08 PROCEDURE — 97112 NEUROMUSCULAR REEDUCATION: CPT

## 2024-02-08 NOTE — PROGRESS NOTES
Daily Note     Today's date: 2024  Patient name: Kristen Maguire  : 1935  MRN: [id number]  Referring provider: Mary Fajardo MD  Dx:   Encounter Diagnosis     ICD-10-CM    1. Left shoulder pain, unspecified chronicity  M25.512       2. Right shoulder pain, unspecified chronicity  M25.511       3. Generalized weakness  R53.1       4. Shoulder stiffness, right  M25.611                      Subjective: Pt states that she went to the hospital yesterday because her R shoulder was in excruciating pain; she was not admitted. She thought it was an episode of gout, however this pain was constant which she states is not typical for gout flare up. Visit to ED was inconclusive. Pain subsided at mid night. She feels okay, just a bit tired. R shoulder is not bad currently; states she took 3 Tylenol. Per chart review potential for UTI as well.       Objective: See treatment diary below      Assessment: Pt required frequent rest throughout session. SpO2 monitored and quickly returned to normal w/ seated rest. Provided frequent check ins with patient due to her report of fatigue/ appearance of tiredness w/ recent trip to ED. Tolerated treatment well. Patient would benefit from continued PT.      Plan: Continue per plan of care.  Progress treatment as tolerated.       Precautions:   *PACEMAKER*  *FALL RISK*    Referral: Help w/ frozen shoulder and help strengthen upper and lower body  Past Medical History:   Diagnosis Date    Cardiac disease     Lyme disease      Past Surgical History:   Procedure Laterality Date    CARDIAC PACEMAKER PLACEMENT      CHOLECYSTECTOMY      HYSTERECTOMY      JOINT REPLACEMENT           Outcome Measures Initial Eval  2024        5xSTS 31 sec BUE; RPE 8/10        TUG 61 sec BUE RW        10 meter (Deferred) ft/sec  (Deferred)  m/s        GRAHAM (Deferred) /56        FGA (Deferred) /30        DGI (Deferred) /24        mCTSIB  - FTEO (firm)  - FTEC (firm)  - FTEO (foam)  - FTEC (foam)  "1/25/2024  50 sec mod sway  25 seconds max sway   NT  NT        6MWT (Deferred)  meters         [facility]         [facility]             SOC: 1/23/2024  FOTO: 1/23/2024  POC Expiration: 4/16/2024   Daily Treatment Log  Date: 2/8/2024 Next session  1/29/2024 2/1/2024 2/5/2024   Visit#/ auth:    3  4  5   Objective Measures           Vitals Pre-tx   BP: 140/80  mmHg   HR 60 bpm  SpO2: 97    Mid tx:  HR 61  SpO2: 88-95%      Post -tx BP: 132/64 mmHg  To 130/70 mmHg  SpO2: 95%  HR: 60  Pre tx  /70 mmHg    Mid tx:   SpO2 92-93%   BP: 150/70 mmHg    Post-tx  BP: 130/62 mmHg  HR: 62 bpm  SpO2: 94%     Pre-tx   BP: 143/64  mmHg   HR 60 bpm    Mid session BP:   130/62 mmHg     Post-tx BP: 136/70mmHg Pre-tx   BP: 130/70  mmHg   HR 73 bpm  SpO2: 91 to 93%    Mid tx:  SpO2: 90-95%    Post -tx BP: 126/64mmHg  SpO2: 89 to 95%  HR: 64 bpm   mCTSIB        Manuals                                                           Neuro Re-Ed 10'     10'  20'   FT EO/EC (CGA)      30\" x 2 FT EC + CGA     Tandem      Semi 30\" x 2 R/L +CGA     Backward walking +CS/CGA       1 lap + CGA and RW   Tandem walking +CS/CGA        1 lap + CGA and RW    High march length of rail  X 25' w/ one standing rest, rollator and CGA on gait belt       1 lap + CGA and RW                           Ther Ex 20'   30'  20'  25'    LAQ 3\" x 10 R/L    2 x 10 R/L  2 x 10 R/L   2 x 10 R/L     Seated marching 3\" x 10 R/L    2 x 10 R/L  2 x 10 R/L  1 x 10 R/L     Supine:   1 x 10 R/L w/ sos   Scapular retraction w/ low row    1 x 10 YTB 2 x 10 YTB 1 x 10 no TB d/t pain   Clamshell hook-lying HOB elevated Unsupported sit 1 x 15 B/L RTB  1 x 10 R/L   1 x 10 R/L YTB   uni iso 2 x 10 R/L YTB    Bridges   Glute set 5\" x 10 supine HOB elevated   Glute set 5\" x 12 supine HOB elevated    Std hip ext/ abd 1 x 10 ea. W/ RTB at knee          Pulleys     X20 R/L    Shoulder Cane ROM          Scapular retraction   1 x 10     Seated Ws   1 x 10 w/ VC for form                 EDUCATION: " Pathology, review of impairements, prognosis, activity modification, POC, and HEP    RW height adjusted; HEP provided       Ther Activity 10'   15'  10'      STS 2 x 5 from raised table w/ CS w/ RTB at knee  X5 + CS  X5 + CS      Ambulation in clinic   50 ft x1 RW +CS  100ft x 1 RW + CS  100ft x 2 rollator +CS     Gait Training                                   Modalities                                   HEP:   Access Code: [email or web address]  URL: [email or web address]  Date: 01/29/2024  Prepared by: Radha Castro    Exercises  - Seated Long Arc Quad  - 2 x daily - 1 sets - 10 reps  - Seated March  - 2 x daily - 1 sets - 10 reps  - Sit to Stand with Counter Support  - 2 x daily - 1 sets - 3 reps

## 2024-02-12 ENCOUNTER — OFFICE VISIT (OUTPATIENT)
Dept: PHYSICAL THERAPY | Facility: CLINIC | Age: 89
End: 2024-02-12
Payer: MEDICARE

## 2024-02-12 DIAGNOSIS — M25.611 SHOULDER STIFFNESS, RIGHT: ICD-10-CM

## 2024-02-12 DIAGNOSIS — R53.1 GENERALIZED WEAKNESS: ICD-10-CM

## 2024-02-12 DIAGNOSIS — M25.511 RIGHT SHOULDER PAIN, UNSPECIFIED CHRONICITY: ICD-10-CM

## 2024-02-12 DIAGNOSIS — M25.512 LEFT SHOULDER PAIN, UNSPECIFIED CHRONICITY: Primary | ICD-10-CM

## 2024-02-12 PROCEDURE — 97530 THERAPEUTIC ACTIVITIES: CPT

## 2024-02-12 PROCEDURE — 97110 THERAPEUTIC EXERCISES: CPT

## 2024-02-12 NOTE — PROGRESS NOTES
Daily Note     Today's date: 2024  Patient name: Kristen Maguire  : 1935  MRN: [id number]  Referring provider: Mary Fajardo MD  Dx:   Encounter Diagnosis     ICD-10-CM    1. Left shoulder pain, unspecified chronicity  M25.512       2. Right shoulder pain, unspecified chronicity  M25.511       3. Generalized weakness  R53.1       4. Shoulder stiffness, right  M25.611                      Subjective: Pt states that she often feels like she has a feeling of faint is coming over her; the sensation travels just above her eyebrow but not into her forehead. States that one time it did travel past her eyebrows and she fainted resulting in trip to ED. She has a primary care appt tomorrow 2024 to clarify medications prescribed last hospital visit. Pt mentions R shoulder pain she had prior to most recent ED visit and describes the intensity of it that day; she does not complain of this pain today.       Objective: See treatment diary below      Assessment: Pt instructed to review current symptoms with PCP. Emphasized the importance of discussing sensation of feeling faint, confusion with current medication regimen, recent SpO2, BP response to position change, and R shoulder pain to pt and caregiver. Pt with significant drop in BP with standing/ seated exercise requiring transition to supine w/ ankle pumps. BP return to patient's norm and symptoms improved prior to end of session. Tolerated treatment well. Patient would benefit from continued PT.       Plan: Continue per plan of care.  Progress treatment as tolerated.       Precautions:   *PACEMAKER*  *FALL RISK*    Referral: Help w/ frozen shoulder and help strengthen upper and lower body  Past Medical History:   Diagnosis Date    Cardiac disease     Lyme disease      Past Surgical History:   Procedure Laterality Date    CARDIAC PACEMAKER PLACEMENT      CHOLECYSTECTOMY      HYSTERECTOMY      JOINT REPLACEMENT           Outcome Measures Initial  "Eval  1/23/2024        5xSTS 31 sec BUE; RPE 8/10        TUG 61 sec BUE RW        10 meter (Deferred) ft/sec  (Deferred)  m/s        GRAHAM (Deferred) /56        FGA (Deferred) /30        DGI (Deferred) /24        mCTSIB  - FTEO (firm)  - FTEC (firm)  - FTEO (foam)  - FTEC (foam) 1/25/2024  50 sec mod sway  25 seconds max sway   NT  NT        6MWT (Deferred)  meters         [facility]         [facility]             SOC: 1/23/2024  FOTO: 1/23/2024  POC Expiration: 4/16/2024   Daily Treatment Log  Date: 2/8/2024 2/12/2024 Next session  2/1/2024 2/5/2024   Visit#/ auth:  7   4  5   Objective Measures          Vitals Pre-tx   BP: 140/80  mmHg   HR 60 bpm  SpO2: 97    Mid tx:  HR 61  SpO2: 88-95%      Post -tx BP: 132/64 mmHg  To 130/70 mmHg  SpO2: 95%  HR: 60 Pre-tx   158/80mmHg  HR: 66 bpm  SpO2 92%    Mid tx   Check #1  HR 60 bpm  BP: 130/62 mmHg    Check #2  BP: 130/80 mmHg    Post -tx BP: SpO2 % 88 to 92%    /60 mmHg w/ symptoms post TE, transitioned to supine  BP: 150/80 mmHg; symptoms resolving    To upright BP: 150/80 mmHg    Pre-tx   BP: 143/64  mmHg   HR 60 bpm    Mid session BP:   130/62 mmHg     Post-tx BP: 136/70mmHg Pre-tx   BP: 130/70  mmHg   HR 73 bpm  SpO2: 91 to 93%    Mid tx:  SpO2: 90-95%    Post -tx BP: 126/64mmHg  SpO2: 89 to 95%  HR: 64 bpm   mCTSIB        Manuals                                                      Neuro Re-Ed 10'    10'  20'   FT EO/EC (CGA)     30\" x 2 FT EC + CGA     Tandem     Semi 30\" x 2 R/L +CGA     Backward walking +CS/CGA      1 lap + CGA and RW   Tandem walking +CS/CGA       1 lap + CGA and RW    High march length of rail  X 25' w/ one standing rest, rollator and CGA on gait belt      1 lap + CGA and RW                         Ther Ex 20' 30'   20'  25'    LAQ 3\" x 10 R/L  1 x 15 R/L  1 x 15 R/L unsupported  2 x 10 R/L   2 x 10 R/L     Seated marching 3\" x 10 R/L  1 x 15 R/L   1 x 15 R/L unsupported  2 x 10 R/L  1 x 10 R/L     Supine:   1 x 10 R/L w/ sos   Scapular retraction w/ " "low row     2 x 10 YTB 1 x 10 no TB d/t pain   Clamshell hook-lying HOB elevated Unsupported sit 1 x 15 B/L RTB Unsupported sit 1 x 15 B/L RTB    uni iso 2 x 10 R/L YTB    Bridges      Glute set 5\" x 12 supine HOB elevated    Std hip ext/ abd 1 x 10 ea. W/ RTB at knee         Pulleys     X20 R/L    Shoulder Cane ROM          Scapular retraction        Seated Ws                   EDUCATION: Pathology, review of impairements, prognosis, activity modification, POC, and HEP          Ther Activity 10' 10'   10'      STS 2 x 5 from raised table w/ CS w/ RTB at knee 1 x 8  from raised table w/ CS   X5 + CS      Ambulation in clinic     100ft x 2 rollator +CS     Gait Training                                Modalities                                HEP:   Access Code: [email or web address]  URL: [email or web address]  Date: 01/29/2024  Prepared by: Radha Castro    Exercises  - Seated Long Arc Quad  - 2 x daily - 1 sets - 10 reps  - Seated March  - 2 x daily - 1 sets - 10 reps  - Sit to Stand with Counter Support  - 2 x daily - 1 sets - 3 reps                     "

## 2024-02-14 ENCOUNTER — APPOINTMENT (OUTPATIENT)
Dept: PHYSICAL THERAPY | Facility: CLINIC | Age: 89
End: 2024-02-14
Payer: MEDICARE

## 2024-02-15 ENCOUNTER — OFFICE VISIT (OUTPATIENT)
Dept: PHYSICAL THERAPY | Facility: CLINIC | Age: 89
End: 2024-02-15
Payer: MEDICARE

## 2024-02-15 DIAGNOSIS — M25.611 SHOULDER STIFFNESS, RIGHT: ICD-10-CM

## 2024-02-15 DIAGNOSIS — R53.1 GENERALIZED WEAKNESS: ICD-10-CM

## 2024-02-15 DIAGNOSIS — M25.512 LEFT SHOULDER PAIN, UNSPECIFIED CHRONICITY: Primary | ICD-10-CM

## 2024-02-15 DIAGNOSIS — M25.511 RIGHT SHOULDER PAIN, UNSPECIFIED CHRONICITY: ICD-10-CM

## 2024-02-15 PROCEDURE — 97530 THERAPEUTIC ACTIVITIES: CPT

## 2024-02-15 NOTE — PROGRESS NOTES
Daily Note     Today's date: 2/15/2024  Patient name: Kristen Maguire  : 1935  MRN: [id number]  Referring provider: Mary Fajardo MD  Dx:   Encounter Diagnosis     ICD-10-CM    1. Left shoulder pain, unspecified chronicity  M25.512       2. Right shoulder pain, unspecified chronicity  M25.511       3. Generalized weakness  R53.1       4. Shoulder stiffness, right  M25.611                      Subjective: Pt was unable to go to her scheduled appt with PCP Tuesday due to weather. She was unable to schedule new appointment due to conflict w/ caregiver's schedule, yet plans to schedule as soon as possible. Pt has appt for pacemaker later today. Pt has gastroenterologist appt next Thursday. Pt has appt with psychologist Dr. Violeta Alvarado tomorrow. Today, pt states that she wishes she could be euthanized; she states that she does not want to continue with old age. She discusses frustration with care-taker. Care-taker does not agree with pt's sanitation practices which is causing her emotional stress. Pt states that current caretaker she was hired after hearing about her from friend; states that caretaker is a good house keeper/ cook.      Objective: See treatment diary below    Pt provided contact to psychologist: Dr. Violeta Alvarado [phone]     Assessment: Pt does not appear to be in immediate harm; she denies having a plan in regard to suicidal thoughts and expresses interest in resolving them via calling [facility] and allowing me to call her Psychologist today. Asked pt if she would like PT to call 911; pt refuses. Pt instructed to inform PCP and psychologist (Dr. Violeta Alvarado) of recent thoughts; pt states she is agreeable. Provided patient with National Suicide Prevention Lifeline, [facility], [facility], [facility] and Division of Aging and Disability Services; pt agreeable to calling these phone numbers as needed. Pt  provided contact information for her Dr. Alvarado and is agreeable to PT calling her. Held therapeutic exercises d/t importance of ensuring patient safety at home. Pt confirms next appoint. Called Dr. Alvarado who confirm Kristen as pt; informed Dr. Alvarado of expression of suicidal thoughts in PT and asked if additional steps should be taken. Dr. Alvarado stating that she will address current situation today and that no additional measures need to be taken by PT. Tolerated treatment well. Patient would benefit from continued PT.       Plan: Continue per plan of care.  Progress treatment as tolerated.       Precautions:   *PACEMAKER*  *FALL RISK*    Referral: Help w/ frozen shoulder and help strengthen upper and lower body  Past Medical History:   Diagnosis Date    Cardiac disease     Lyme disease      Past Surgical History:   Procedure Laterality Date    CARDIAC PACEMAKER PLACEMENT      CHOLECYSTECTOMY      HYSTERECTOMY      JOINT REPLACEMENT           Outcome Measures Initial Eval  1/23/2024        5xSTS 31 sec BUE; RPE 8/10        TUG 61 sec BUE RW        10 meter (Deferred) ft/sec  (Deferred)  m/s        GRAHAM (Deferred) /56        FGA (Deferred) /30        DGI (Deferred) /24        mCTSIB  - FTEO (firm)  - FTEC (firm)  - FTEO (foam)  - FTEC (foam) 1/25/2024  50 sec mod sway  25 seconds max sway   NT  NT        6MWT (Deferred)  meters         [facility]         [facility]             SOC: 1/23/2024  FOTO: 1/23/2024  POC Expiration: 4/16/2024   Daily Treatment Log  Date: 2/8/2024 2/12/2024 2/14/2024 2/1/2024 2/5/2024   Visit#/ auth:  7 8  4  5   Objective Measures          Vitals Pre-tx   BP: 140/80  mmHg   HR 60 bpm  SpO2: 97    Mid tx:  HR 61  SpO2: 88-95%      Post -tx BP: 132/64 mmHg  To 130/70 mmHg  SpO2: 95%  HR: 60 Pre-tx   158/80mmHg  HR: 66 bpm  SpO2 92%    Mid tx   Check #1  HR 60 bpm  BP: 130/62 mmHg    Check #2  BP: 130/80 mmHg    Post -tx BP: SpO2 % 88 to 92%    /60 mmHg w/ symptoms post TE, transitioned to  "supine  BP: 150/80 mmHg; symptoms resolving    To upright BP: 150/80 mmHg    Pre-tx   BP: 143/64  mmHg   HR 60 bpm    Mid session BP:   130/62 mmHg     Post-tx BP: 136/70mmHg Pre-tx   BP: 130/70  mmHg   HR 73 bpm  SpO2: 91 to 93%    Mid tx:  SpO2: 90-95%    Post -tx BP: 126/64mmHg  SpO2: 89 to 95%  HR: 64 bpm   mCTSIB        Manuals                                                      Neuro Re-Ed 10'    10'  20'   FT EO/EC (CGA)     30\" x 2 FT EC + CGA     Tandem     Semi 30\" x 2 R/L +CGA     Backward walking +CS/CGA      1 lap + CGA and RW   Tandem walking +CS/CGA       1 lap + CGA and RW    High march length of rail  X 25' w/ one standing rest, rollator and CGA on gait belt      1 lap + CGA and RW                         Ther Ex 20' 30'   20'  25'    LAQ 3\" x 10 R/L  1 x 15 R/L  1 x 15 R/L unsupported  2 x 10 R/L   2 x 10 R/L     Seated marching 3\" x 10 R/L  1 x 15 R/L   1 x 15 R/L unsupported  2 x 10 R/L  1 x 10 R/L     Supine:   1 x 10 R/L w/ sos   Scapular retraction w/ low row     2 x 10 YTB 1 x 10 no TB d/t pain   Clamshell hook-lying HOB elevated Unsupported sit 1 x 15 B/L RTB Unsupported sit 1 x 15 B/L RTB    uni iso 2 x 10 R/L YTB    Bridges      Glute set 5\" x 12 supine HOB elevated    Std hip ext/ abd 1 x 10 ea. W/ RTB at knee         Pulleys     X20 R/L    Shoulder Cane ROM          Scapular retraction        Seated Ws                   EDUCATION: Pathology, review of impairements, prognosis, activity modification, POC, and HEP          Ther Activity 10' 10' 25'  10'      STS 2 x 5 from raised table w/ CS w/ RTB at knee 1 x 8  from raised table w/ CS   X5 + CS      Ambulation in clinic     100ft x 2 rollator +CS     Patient education on safety at home        Gait Training                                Modalities                                HEP:   Access Code: [email or web address]  URL: [email or web address]  Date: 01/29/2024  Prepared by: Radha Horan  - Seated Long Arc Quad  - 2 x " daily - 1 sets - 10 reps  - Seated March  - 2 x daily - 1 sets - 10 reps  - Sit to Stand with Counter Support  - 2 x daily - 1 sets - 3 reps

## 2024-02-19 ENCOUNTER — TELEPHONE (OUTPATIENT)
Dept: PHYSICAL THERAPY | Facility: CLINIC | Age: 89
End: 2024-02-19

## 2024-02-19 ENCOUNTER — APPOINTMENT (OUTPATIENT)
Dept: PHYSICAL THERAPY | Facility: CLINIC | Age: 89
End: 2024-02-19
Payer: MEDICARE

## 2024-02-19 NOTE — TELEPHONE ENCOUNTER
"jacy just called for carlos Maguire to cx for today. says they had a rough night and she did not sleep and now is finally alseep so they are cancelling for today. i offered for them to call later in the day if she would like to come after getting some sleep but she said she will not have a  for later, eugenia stephenson. and reported that she had made an appt with her PCP for tomorrow, \"I hope we can make it\"    "

## 2024-02-20 ENCOUNTER — APPOINTMENT (EMERGENCY)
Dept: RADIOLOGY | Facility: HOSPITAL | Age: 89
End: 2024-02-20
Payer: MEDICARE

## 2024-02-20 ENCOUNTER — HOSPITAL ENCOUNTER (EMERGENCY)
Facility: HOSPITAL | Age: 89
Discharge: HOME | End: 2024-02-20
Attending: EMERGENCY MEDICINE
Payer: MEDICARE

## 2024-02-20 VITALS
OXYGEN SATURATION: 92 % | HEART RATE: 60 BPM | DIASTOLIC BLOOD PRESSURE: 53 MMHG | RESPIRATION RATE: 16 BRPM | SYSTOLIC BLOOD PRESSURE: 107 MMHG | TEMPERATURE: 97.7 F

## 2024-02-20 DIAGNOSIS — R42 LIGHTHEADEDNESS: Primary | ICD-10-CM

## 2024-02-20 LAB
2HR DELTA HS TROPONIN: 0 NG/L
ALBUMIN SERPL BCP-MCNC: 3.3 G/DL (ref 3.5–5)
ALP SERPL-CCNC: 59 U/L (ref 34–104)
ALT SERPL W P-5'-P-CCNC: 8 U/L (ref 7–52)
ANION GAP SERPL CALCULATED.3IONS-SCNC: 7 MMOL/L
AST SERPL W P-5'-P-CCNC: 12 U/L (ref 13–39)
ATRIAL RATE: 60 BPM
BASOPHILS # BLD AUTO: 0.04 THOUSANDS/ÂΜL (ref 0–0.1)
BASOPHILS NFR BLD AUTO: 0 % (ref 0–1)
BILIRUB SERPL-MCNC: 0.41 MG/DL (ref 0.2–1)
BUN SERPL-MCNC: 16 MG/DL (ref 5–25)
CALCIUM ALBUM COR SERPL-MCNC: 11.6 MG/DL (ref 8.3–10.1)
CALCIUM SERPL-MCNC: 11 MG/DL (ref 8.4–10.2)
CARDIAC TROPONIN I PNL SERPL HS: 5 NG/L
CARDIAC TROPONIN I PNL SERPL HS: 5 NG/L
CHLORIDE SERPL-SCNC: 101 MMOL/L (ref 96–108)
CO2 SERPL-SCNC: 29 MMOL/L (ref 21–32)
CREAT SERPL-MCNC: 0.82 MG/DL (ref 0.6–1.3)
EOSINOPHIL # BLD AUTO: 0.23 THOUSAND/ÂΜL (ref 0–0.61)
EOSINOPHIL NFR BLD AUTO: 2 % (ref 0–6)
ERYTHROCYTE [DISTWIDTH] IN BLOOD BY AUTOMATED COUNT: 14.9 % (ref 11.6–15.1)
GFR SERPL CREATININE-BSD FRML MDRD: 64 ML/MIN/1.73SQ M
GLUCOSE SERPL-MCNC: 165 MG/DL (ref 65–140)
HCT VFR BLD AUTO: 31.2 % (ref 34.8–46.1)
HGB BLD-MCNC: 10 G/DL (ref 11.5–15.4)
IMM GRANULOCYTES # BLD AUTO: 0.04 THOUSAND/UL (ref 0–0.2)
IMM GRANULOCYTES NFR BLD AUTO: 0 % (ref 0–2)
LYMPHOCYTES # BLD AUTO: 1.8 THOUSANDS/ÂΜL (ref 0.6–4.47)
LYMPHOCYTES NFR BLD AUTO: 17 % (ref 14–44)
MCH RBC QN AUTO: 28.4 PG (ref 26.8–34.3)
MCHC RBC AUTO-ENTMCNC: 32.1 G/DL (ref 31.4–37.4)
MCV RBC AUTO: 89 FL (ref 82–98)
MONOCYTES # BLD AUTO: 1.1 THOUSAND/ÂΜL (ref 0.17–1.22)
MONOCYTES NFR BLD AUTO: 10 % (ref 4–12)
NEUTROPHILS # BLD AUTO: 7.34 THOUSANDS/ÂΜL (ref 1.85–7.62)
NEUTS SEG NFR BLD AUTO: 71 % (ref 43–75)
NRBC BLD AUTO-RTO: 0 /100 WBCS
PLATELET # BLD AUTO: 376 THOUSANDS/UL (ref 149–390)
PMV BLD AUTO: 9.6 FL (ref 8.9–12.7)
POTASSIUM SERPL-SCNC: 3.6 MMOL/L (ref 3.5–5.3)
PROT SERPL-MCNC: 6.3 G/DL (ref 6.4–8.4)
QRS AXIS: -79 DEGREES
QRSD INTERVAL: 184 MS
QT INTERVAL: 486 MS
QTC INTERVAL: 486 MS
RBC # BLD AUTO: 3.52 MILLION/UL (ref 3.81–5.12)
SODIUM SERPL-SCNC: 137 MMOL/L (ref 135–147)
T WAVE AXIS: 69 DEGREES
VENTRICULAR RATE: 60 BPM
WBC # BLD AUTO: 10.55 THOUSAND/UL (ref 4.31–10.16)

## 2024-02-20 PROCEDURE — 85025 COMPLETE CBC W/AUTO DIFF WBC: CPT | Performed by: EMERGENCY MEDICINE

## 2024-02-20 PROCEDURE — 99285 EMERGENCY DEPT VISIT HI MDM: CPT | Performed by: EMERGENCY MEDICINE

## 2024-02-20 PROCEDURE — 93010 ELECTROCARDIOGRAM REPORT: CPT | Performed by: INTERNAL MEDICINE

## 2024-02-20 PROCEDURE — 84484 ASSAY OF TROPONIN QUANT: CPT | Performed by: EMERGENCY MEDICINE

## 2024-02-20 PROCEDURE — 36415 COLL VENOUS BLD VENIPUNCTURE: CPT | Performed by: EMERGENCY MEDICINE

## 2024-02-20 PROCEDURE — 93005 ELECTROCARDIOGRAM TRACING: CPT

## 2024-02-20 PROCEDURE — 73030 X-RAY EXAM OF SHOULDER: CPT

## 2024-02-20 PROCEDURE — 80053 COMPREHEN METABOLIC PANEL: CPT | Performed by: EMERGENCY MEDICINE

## 2024-02-20 PROCEDURE — 99285 EMERGENCY DEPT VISIT HI MDM: CPT

## 2024-02-20 RX ORDER — SODIUM CHLORIDE 9 MG/ML
3 INJECTION INTRAVENOUS
Status: DISCONTINUED | OUTPATIENT
Start: 2024-02-20 | End: 2024-02-20 | Stop reason: HOSPADM

## 2024-02-20 NOTE — ED NOTES
Pt called her caregiver to pick her up. Caregiver should be on her way.     Yomaira Tabares RN  02/20/24 0728

## 2024-02-20 NOTE — ED NOTES
Patient is discharged and waiting to call her caregiver at 7 am to pick her up. Pt had urinary incontinence, cleaned, changed and provided with warm blankets.     Yomaira Tabares RN  02/20/24 0357

## 2024-02-20 NOTE — ED PROVIDER NOTES
History  Chief Complaint   Patient presents with    Dizziness     Pt reports dizziness x1 day, also c/o gen weakness for same time.      88-year-old female past medical significant for pacemaker currently on Eliquis presenting to ED today for an episode of lightheadedness.  Patient states that has been feeling lightheaded all day.  She also is complaining of weakness throughout the day as well.  She is also complaining of right-sided shoulder pain which upon review of the chart she chronically has.  When asked if she has follow-up with Ortho she said she does not want to see the orthopedic doctors because she does not trust them.  She is denying any shortness of breath.  No chest pain at this time.  No nausea or vomiting.  No recent illnesses.        Prior to Admission Medications   Prescriptions Last Dose Informant Patient Reported? Taking?   Apixaban (ELIQUIS PO)   Yes No   Sig: Take 5 mg by mouth 2 (two) times a day   Diclofenac Sodium (VOLTAREN) 1 %   No No   Sig: Apply 2 g topically 4 (four) times a day   acetaminophen (TYLENOL) 325 mg tablet  Outside Facility (Specify) Yes No   Sig: Take 650 mg by mouth every 6 (six) hours as needed for mild pain   albuterol (PROVENTIL HFA,VENTOLIN HFA) 90 mcg/act inhaler   Yes No   bisacodyl (bisacodyl) 5 mg EC tablet   Yes No   Sig: Take 5 mg by mouth daily as needed for constipation   carvedilol (COREG) 6.25 mg tablet   Yes No   Sig: Take 6.25 mg by mouth in the morning   clotrimazole (LOTRIMIN) 1 % cream   No No   Sig: Apply topically 2 (two) times a day   Patient not taking: Reported on 1/13/2024   dextromethorphan-guaiFENesin (ROBITUSSIN DM)  mg/5 mL syrup   No No   Sig: Take 10 mL by mouth every 4 (four) hours as needed for cough or congestion   Patient not taking: Reported on 1/13/2024   docusate sodium (COLACE) 100 mg capsule  Self Yes No   Sig: Take 100 mg by mouth 2 (two) times a day   famotidine (PEPCID) 40 MG tablet  Self Yes No   Sig: Take 40 mg by mouth  daily   furosemide (LASIX) 20 mg tablet   Yes No   Sig: Take 20 mg by mouth in the morning   lidocaine (Lidoderm) 5 %   No No   Sig: Apply 1 patch topically over 12 hours daily Remove & Discard patch within 12 hours or as directed by MD forbes (SINGULAIR) 10 mg tablet   Yes No   ondansetron (ZOFRAN-ODT) 4 mg disintegrating tablet   No No   Sig: Take 1 tablet (4 mg total) by mouth every 6 (six) hours as needed for nausea for up to 3 days   pantoprazole (PROTONIX) 20 mg tablet   No No   Sig: Take 1 tablet (20 mg total) by mouth daily   risperiDONE (RisperDAL) 0.25 mg tablet  Self Yes No   Sig: Take 0.25 mg by mouth daily   sertraline (ZOLOFT) 25 mg tablet   Yes No   Sig: Take 25 mg by mouth daily      Facility-Administered Medications: None       Past Medical History:   Diagnosis Date    Arthritis     Cardiac disease     Gout     Lyme disease        Past Surgical History:   Procedure Laterality Date    CARDIAC PACEMAKER PLACEMENT      CHOLECYSTECTOMY      HYSTERECTOMY      JOINT REPLACEMENT         History reviewed. No pertinent family history.  I have reviewed and agree with the history as documented.    E-Cigarette/Vaping    E-Cigarette Use Never User      E-Cigarette/Vaping Substances    Nicotine No     THC No     CBD No     Flavoring No     Other No     Unknown No      Social History     Tobacco Use    Smoking status: Never    Smokeless tobacco: Never   Vaping Use    Vaping status: Never Used   Substance Use Topics    Alcohol use: Never    Drug use: No       Review of Systems   Constitutional:  Negative for chills and fever.   HENT:  Negative for hearing loss.    Eyes:  Negative for visual disturbance.   Respiratory:  Negative for shortness of breath.    Cardiovascular:  Negative for chest pain.   Gastrointestinal:  Negative for abdominal pain, constipation, diarrhea, nausea and vomiting.   Genitourinary:  Negative for difficulty urinating.   Musculoskeletal:  Negative for myalgias.   Skin:  Negative for  color change.   Neurological:  Positive for weakness. Negative for dizziness and headaches.   Psychiatric/Behavioral:  Negative for agitation.    All other systems reviewed and are negative.      Physical Exam  Physical Exam  Vitals and nursing note reviewed.   Constitutional:       General: She is not in acute distress.     Appearance: Normal appearance. She is well-developed. She is not ill-appearing.   HENT:      Head: Normocephalic and atraumatic.      Right Ear: External ear normal.      Left Ear: External ear normal.      Nose: Nose normal.      Mouth/Throat:      Mouth: Mucous membranes are moist.      Pharynx: Oropharynx is clear. No oropharyngeal exudate.   Eyes:      General:         Right eye: No discharge.         Left eye: No discharge.      Extraocular Movements: Extraocular movements intact.      Conjunctiva/sclera: Conjunctivae normal.      Pupils: Pupils are equal, round, and reactive to light.   Cardiovascular:      Rate and Rhythm: Normal rate and regular rhythm.      Heart sounds: Normal heart sounds. No murmur heard.     No friction rub. No gallop.   Pulmonary:      Effort: Pulmonary effort is normal. No respiratory distress.      Breath sounds: Normal breath sounds. No stridor. No wheezing.   Abdominal:      General: Bowel sounds are normal. There is no distension.      Palpations: Abdomen is soft.      Tenderness: There is no abdominal tenderness.   Musculoskeletal:         General: No swelling. Normal range of motion.      Cervical back: Normal range of motion and neck supple. No rigidity.      Comments: Tenderness with range of motion of the right shoulder.   Skin:     General: Skin is warm and dry.      Capillary Refill: Capillary refill takes less than 2 seconds.   Neurological:      General: No focal deficit present.      Mental Status: She is alert and oriented to person, place, and time. Mental status is at baseline.      Motor: No weakness.      Gait: Gait normal.   Psychiatric:          Mood and Affect: Mood normal.         Behavior: Behavior normal.         Vital Signs  ED Triage Vitals [02/20/24 0024]   Temperature Pulse Respirations Blood Pressure SpO2   97.7 °F (36.5 °C) 60 18 115/57 98 %      Temp Source Heart Rate Source Patient Position - Orthostatic VS BP Location FiO2 (%)   Oral Monitor Lying Left arm --      Pain Score       8           Vitals:    02/20/24 0130 02/20/24 0200 02/20/24 0230 02/20/24 0300   BP: 124/59 127/62 137/60 122/59   Pulse: 60 60 60 60   Patient Position - Orthostatic VS: Lying Lying Lying Lying         Visual Acuity      ED Medications  Medications   sodium chloride (PF) 0.9 % injection 3 mL (has no administration in time range)       Diagnostic Studies  Results Reviewed       Procedure Component Value Units Date/Time    HS Troponin I 2hr [[id number]]  (Normal) Collected: 02/20/24 0242    Lab Status: Final result Specimen: Blood from Arm, Right Updated: 02/20/24 0311     hs TnI 2hr 5 ng/L      Delta 2hr hsTnI 0 ng/L     HS Troponin I 4hr [[id number]]     Lab Status: No result Specimen: Blood     HS Troponin 0hr (reflex protocol) [[id number]]  (Normal) Collected: 02/20/24 0043    Lab Status: Final result Specimen: Blood from Arm, Left Updated: 02/20/24 0108     hs TnI 0hr 5 ng/L     Comprehensive metabolic panel [[id number]]  (Abnormal) Collected: 02/20/24 0043    Lab Status: Final result Specimen: Blood from Arm, Left Updated: 02/20/24 0106     Sodium 137 mmol/L      Potassium 3.6 mmol/L      Chloride 101 mmol/L      CO2 29 mmol/L      ANION GAP 7 mmol/L      BUN 16 mg/dL      Creatinine 0.82 mg/dL      Glucose 165 mg/dL      Calcium 11.0 mg/dL      Corrected Calcium 11.6 mg/dL      AST 12 U/L      ALT 8 U/L      Alkaline Phosphatase 59 U/L      Total Protein 6.3 g/dL      Albumin 3.3 g/dL      Total Bilirubin 0.41 mg/dL      eGFR 64 ml/min/1.73sq m     Narrative:      National Kidney Disease Foundation guidelines for Chronic Kidney Disease (CKD):     Stage 1 with  normal or high GFR (GFR > 90 mL/min/1.73 square meters)    Stage 2 Mild CKD (GFR = 60-89 mL/min/1.73 square meters)    Stage 3A Moderate CKD (GFR = 45-59 mL/min/1.73 square meters)    Stage 3B Moderate CKD (GFR = 30-44 mL/min/1.73 square meters)    Stage 4 Severe CKD (GFR = 15-29 mL/min/1.73 square meters)    Stage 5 End Stage CKD (GFR <15 mL/min/1.73 square meters)  Note: GFR calculation is accurate only with a steady state creatinine    CBC and differential [[id number]]  (Abnormal) Collected: 02/20/24 0043    Lab Status: Final result Specimen: Blood from Arm, Left Updated: 02/20/24 0048     WBC 10.55 Thousand/uL      RBC 3.52 Million/uL      Hemoglobin 10.0 g/dL      Hematocrit 31.2 %      MCV 89 fL      MCH 28.4 pg      MCHC 32.1 g/dL      RDW 14.9 %      MPV 9.6 fL      Platelets 376 Thousands/uL      nRBC 0 /100 WBCs      Neutrophils Relative 71 %      Immat GRANS % 0 %      Lymphocytes Relative 17 %      Monocytes Relative 10 %      Eosinophils Relative 2 %      Basophils Relative 0 %      Neutrophils Absolute 7.34 Thousands/µL      Immature Grans Absolute 0.04 Thousand/uL      Lymphocytes Absolute 1.80 Thousands/µL      Monocytes Absolute 1.10 Thousand/µL      Eosinophils Absolute 0.23 Thousand/µL      Basophils Absolute 0.04 Thousands/µL                    XR shoulder 2+ views RIGHT   ED Interpretation by Giovany Robbins MD (02/20 0126)   I interpreted this XR as no acute osseus abnormality.                 Procedures  Procedures         ED Course  ED Course as of 02/20/24 0340   Tue Feb 20, 2024   0113 hs TnI 0hr: 5  Will delta.   0113 Comprehensive metabolic panel(!)  No changes from patient's baseline.   0114 CBC and differential(!)  No significant abnormalities                               SBIRT 20yo+      Flowsheet Row Most Recent Value   Initial Alcohol Screen: US AUDIT-C     1. How often do you have a drink containing alcohol? 0 Filed at: 02/20/2024 0027   2. How many drinks containing alcohol do you  have on a typical day you are drinking?  0 Filed at: 02/20/2024 0027   3a. Male UNDER 65: How often do you have five or more drinks on one occasion? 0 Filed at: 02/20/2024 0027   3b. FEMALE Any Age, or MALE 65+: How often do you have 4 or more drinks on one occassion? 0 Filed at: 02/20/2024 0027   Audit-C Score 0 Filed at: 02/20/2024 0027   BERNARDINO: How many times in the past year have you...    Used an illegal drug or used a prescription medication for non-medical reasons? Never Filed at: 02/20/2024 0027                      Medical Decision Making  88-year-old female presenting to the ED today with generalized weakness.  At this time differential includes electrolyte abnormality versus acute blood loss anemia versus ACS.  In terms of the right shoulder likely her chronic pain but will do an x-ray to evaluate for new fracture.  Will also check CBC, CMP, troponin ruled out to her troponin as well.    Lab workup as well as EKG and x-ray were otherwise unremarkable.  Patient discharged home.  Discussed with her that she should continue follow-up primary care doctor.  Strict return to ER precautions discussed and patient discharged home.    Amount and/or Complexity of Data Reviewed  Labs: ordered. Decision-making details documented in ED Course.  Radiology: ordered and independent interpretation performed.    Risk  Prescription drug management.             Disposition  Final diagnoses:   Lightheadedness     Time reflects when diagnosis was documented in both MDM as applicable and the Disposition within this note       Time User Action Codes Description Comment    2/20/2024  3:14 AM Giovany Robbins Add [R42] Lightheadedness           ED Disposition       ED Disposition   Discharge    Condition   Stable    Date/Time   Tue Feb 20, 2024 0314    Comment   Kristen Maguire discharge to home/self care.                   Follow-up Information       Follow up With Specialties Details Why Contact Info Additional Information    John  DO Patrick  Schedule an appointment as soon as possible for a visit in 2 days for follow up [address] [facility] [address]  [facility], [place]  [phone]       [facility] Emergency Department Emergency Medicine Go to  If symptoms worsen, As needed [address] [facility] [id number] [phone] [facility] Emergency Department, [address] [place] [address], [id number]            Patient's Medications   Discharge Prescriptions    No medications on file       No discharge procedures on file.    PDMP Review       None            ED Provider  Electronically Signed by             Giovany Robbins MD  02/20/24 5577

## 2024-02-20 NOTE — ED NOTES
COPD Flare    You have had a flare-up of your COPD.  COPD, or chronic obstructive pulmonary disease, is a common lung disease. It causes your airways to become irritated and narrower. This makes it harder for you to breathe. Emphysema and chronic bronchitis are both types of COPD. This is a chronic condition, which means you always have it. Sometimes it gets worse. When this happens, it is called a flare-up.  Symptoms of COPD  People with COPD may have symptoms most of the time. In a flare-up, your symptoms get worse. These symptoms may mean you are having a flare-up:  · Shortness of breath, shallow or rapid breathing, or wheezing that gets worse  · Lung infection  · Cough that gets worse  · More mucus, thicker mucus or mucus of a different color  · Tiredness, decreased energy, or trouble doing your usual activities  · Fever  · Chest tightness  · Your symptoms don’t get better even when you use your usual medicines, inhalers, and nebulizer  · Trouble talking  · You feel confused  Causes of flare-ups  Unfortunately, a flare-up can happen even though you did everything right, and you followed your doctor’s instructions. Some causes of flare-ups are:  · Smoking or secondhand smoke  · Colds, the flu, or respiratory infections  · Air pollution  · Sudden change in the weather  · Dust, irritating chemicals, or strong fumes  · Not taking your medicines as prescribed  Home care  Here are some things you can do at home to treat a flare-up:  · Try not to panic. This makes it harder to breathe, and keeps you from doing the right things.  · Don’t smoke or be around others who are smoking.  · Try to drink more fluids than usual during a flare-up, unless your doctor has told you not to because of heart and kidney problems. More fluids can help loosen the mucus.  · Use your inhalers and nebulizer, if you have one, as you have been told to.  · If you were given antibiotics, take them until they are used up or your doctor tells you  X-ray at bedside.     Yomaira Tabares RN  02/20/24 0050     to stop. It’s important to finish the antibiotics, even though you feel better. This will make sure the infection has cleared.  · If you were given prednisone or another steroid, finish it even if you feel better.  Preventing a flare-up  Even though flare-ups happen, the best way to treat one is to prevent it before it starts. Here are some pointers:  · Don’t smoke or be around others who are smoking.  · Take your medicines as you have been told.  · Talk with your doctor about getting a flu shot every year. Also find out if you need a pneumonia shot.  · If there is a weather advisory warning to stay indoors, try to stay inside when possible.  · Try to eat healthy and get plenty of sleep.  · Try to avoid things that usually set you off, like dust, chemical fumes, hairsprays, or strong perfumes.  Follow-up care  Follow up with your healthcare provider.  If a culture was done, you will be told if your treatment needs to be changed. You can call as directed for the results.  If X-rays were done, and a radiologist had not seen them while you were there, they will be reviewed. You will be told if there is a change in the reading, especially if it affects your treatment.  Call 911  Call 911 if any of these occur:  · You have trouble breathing  · You feel confused or it’s difficult to wake you up  · You faint or lose consciousness  · You have a rapid heart rate  · You have new pain in your chest, arm, shoulder, neck or upper back  When to seek medical advice  Call your healthcare provider right away if any of these occur:  · Wheezing or shortness of breath gets worse  · You need to use your inhalers more often than usual without relief  · Fever of 100.4°F (38ºC) or higher, or as directed  · Coughing up lots of dark-colored or bloody sputum (mucus)  · Chest pain with each breath  · You do not start to get better within 24 hours  · Swelling or your ankles gets worse  · Dizziness or weakness     © 4586-3969 [employer] [facility]  [employer], [employer]. [address] [place] [address], [place] [address]. All rights reserved. This information is not intended as a substitute for professional medical care. Always follow your healthcare professional's instructions.

## 2024-02-20 NOTE — DISCHARGE INSTRUCTIONS
Your labs and imaging were otherwise unremarkable.    Please follow up with your primary care provider.    Return to the ER for any worsening symptoms.

## 2024-02-20 NOTE — ED NOTES
Patient is resting comfortably. Lights dimmed in room, warm blankets provided.     Yomaira Tabares RN  02/20/24 9516

## 2024-02-21 ENCOUNTER — APPOINTMENT (OUTPATIENT)
Dept: PHYSICAL THERAPY | Facility: CLINIC | Age: 89
End: 2024-02-21
Payer: MEDICARE

## 2024-02-29 ENCOUNTER — EVALUATION (OUTPATIENT)
Dept: PHYSICAL THERAPY | Facility: CLINIC | Age: 89
End: 2024-02-29
Payer: MEDICARE

## 2024-02-29 DIAGNOSIS — R53.1 GENERALIZED WEAKNESS: ICD-10-CM

## 2024-02-29 DIAGNOSIS — M25.611 SHOULDER STIFFNESS, RIGHT: ICD-10-CM

## 2024-02-29 DIAGNOSIS — M25.512 LEFT SHOULDER PAIN, UNSPECIFIED CHRONICITY: Primary | ICD-10-CM

## 2024-02-29 DIAGNOSIS — M25.511 RIGHT SHOULDER PAIN, UNSPECIFIED CHRONICITY: ICD-10-CM

## 2024-02-29 PROCEDURE — 97110 THERAPEUTIC EXERCISES: CPT

## 2024-02-29 NOTE — PROGRESS NOTES
PT Evaluation     Today's date: 2024  Patient name: Kristen Maguire  : 1935  MRN: [id number]  Referring provider: Mary Fajardo MD  Dx:   Encounter Diagnosis     ICD-10-CM    1. Left shoulder pain, unspecified chronicity  M25.512       2. Right shoulder pain, unspecified chronicity  M25.511       3. Generalized weakness  R53.1       4. Shoulder stiffness, right  M25.611                      Assessment  Assessment details: 2024: Kristen Maguire is a 88 y.o. female who presents with bilateral shoulder pain and weakness of bilateral LE. Pt has a significant medical history and multiple hospitalizations as of recent. Pt is currently experiencing flank pain addressed at most recent ED visit  and urinary incontinence. Pt and care-giver instructed to contact PCP regarding bowel and bladder dysfunction. On evaluation, pt demonstrates impaired shoulder AROM, PROM, and strength and impaired BLE weakness, transfers, gait, and balance. Due to these impairments, patient has difficulty reaching overhead, with STS, bed mobility and ambulation affecting her ability to ambulate in her home/outside and perform ADLs without pain and fear of falling. Patient's clinical presentation is consistent with their referring diagnosis of Left shoulder pain, unspecified chronicity  (primary encounter diagnosis), Right shoulder pain, unspecified chronicity, and Generalized weakness. Evaluation limited today due to patient's late arrival, however, pt will benefit from further evaluation next visit. Patient has been educated in pathology, review of impairements, prognosis, activity modification, POC, and HEP. Patient would benefit from skilled physical therapy services to address their aforementioned functional limitations and progress towards prior level of function and independence with home exercise program.     2024: Kristen Maguire is a 88 y.o. female who presents to re-evaluation with bilateral shoulder pain and  weakness of bilateral LE. Pt returns to PT from 2 week period of inactivity d/t other health conditions. On examination today, pt presents with similar shoulder strength and functional limitations.  Pt also demonstrates similar LE strength compared to initial evaluation. Pt demonstrates decline in bilateral shoulder AROM and 5xSTS time which can be attributed to recent period of PT inactivity and ongoing health conditions. Pt does, however, demonstrate improved TUG score indicating progress in regard to LE function; of note, pt did perform this test w/ rollator vs RW as completed on initial evaluation, today. Pt educated on remaining deficits, progress, and POC. Pt with preference to continue PT at this time despite limited progress demonstrated on exam. Pt educated that w/ pt's significant medical hx and complaint of symptoms outside of PT scope of care, pt is to prioritize health care appointments in these areas as she voices transportation being an issue. Pt agreeable to seeing appropriate healthcare professionals in regard to gastrointestinal function and mental health, however plans to continue physical therapy at this time. Patient would benefit from skilled physical therapy services to address their aforementioned functional limitations and progress towards prior level of function and independence with home exercise program.   Impairments: abnormal gait, abnormal or restricted ROM, abnormal movement, activity intolerance, impaired balance, impaired physical strength, lacks appropriate home exercise program, safety issue, poor posture  and poor body mechanics  Understanding of Dx/Px/POC: good   Prognosis: good    Goals  Short term goals to be achieved by 4 weeks: 2/20/2024  STG1: Patient will be independent with simple HEP to maximize progress between therapy sessions (MET 2/29/2024)  STG2: Patient will improve BLE strength to no less than 4+/5 for improved 5X STS.  (Ongoing 2/29/2024)  STG3: Pt will demo  functional shoulder ER reach to C7 bilaterally for improved ability to reach behind head for bathing and to put on seat belt.  (Ongoing 2/29/2024)  STG4: Patient will improve TUG by 3 seconds to demo decreased risk for falls. (MET 2/29/2024)    Long Term Goals to be achieved by 12 weeks: 4/16/2024  LTG1: Patient will be independent with updated HEP to demo progress toward discharge to HEP.   LTG2: Patient will improve 5X STS to 14.8 sec w/ BUE assist to demo improved BLE strength necessary for safe transfers chair to RW.   LTG3: Pt will demo B/L shoulder flex and abd AROM of at least 80 degrees to perform ADLs at chest height.    LTG4: Patient will improve TUG to less than 20 seconds (w/ RW + BUE assist for transfer) to return to independence with basic transfers.  LTG4: Pt will report RPE of no greater than 3/10 w/ 5xSTS to demo improved activity tolerance necessary for transfers, dressing, and ambulation in home.           Plan  Plan details: HEP development, stretching, strengthening, A/AA/PROM, joint mobilizations, posture education, STM/MI as needed to reduce muscle tension, muscle reeducation, balance training, transfer training, gait training, and safe AD utilization training. PLOC discussed and agreed upon with patient.   Patient would benefit from: PT eval and skilled physical therapy  Planned modality interventions: cryotherapy and thermotherapy: hydrocollator packs  Planned therapy interventions: balance/weight bearing training, coordination, gait training, home exercise program, therapeutic exercise, therapeutic activities, strengthening, patient education, neuromuscular re-education, abdominal trunk stabilization, activity modification, manual therapy, balance, functional ROM exercises and transfer training  Frequency: 2-3x/week.  Plan of Care beginning date: 1/23/2024  Plan of Care expiration date: 4/16/2024  Treatment plan discussed with: patient        Subjective Evaluation    History of Present  Illness  Mechanism of injury: Pt is a 88 y.o. female who presents to PT with complaint of BLE weakness; pt ambulates with RW at baseline. Pt is a poor historian and care-giver (Clari) was not present for evaluation. Care-giver provides little insight at end of session. Kristen is responsible for her own medical management as per care-giver's report including scheduling doctors appointments. Pt states that she had pain in her hand one night. She prolonged calling ambulance; after trying to sleep she realized she needed to call ED. She was told to turn the light on for the ambulance, while walking in house to prepare for transfer to ER, she passed out and fell onto B/L knees. At hospital she came to find out that she had gout. Pt then was in inpatient rehab for two months where she was ambulating with WC. At end of October, pt had been home about three days and had to call 911 d/t paleness which was due to COVID-19. Was in rehab for an additional month. Pt was hospitalized 1/13 due to report of flank pain as per medical chart, however patient reports most recent hospitalization being due to COVID-19. Pt states that rib pain began a few weeks ago and does not recall it being addressed in hospital until reminded by PT as read in chart review. Pt recalls having UTI prior to hospital admission. Pt states that she has seen PCP twice since release from rehab. Pt has bilateral shoulder pain in front and back. This began over the summer; she was treated in physical therapy for this, however as of recently her shoulder pain has returned (R pain > L). Pt states that she has weak hands. Pt has pain in bilateral knees; she has hx of bilateral knee replacement. She has left foot pain which she thinks is d/t gout. She feels unsteady while walking. Pt does not drive; she has a . Pt states that she does not have a caregiver 24/7. She has a ramp to enter home. She has family in [facility]. Pt has difficulty reaching arms  overhead, standing from a chair, walking getting in and out of bed, and using her hands as they are stiff in the morning.     CT abdomen 1/13 Impression:   1. Moderate sized fat-containing hernia inferior to the umbilicus with scattered infiltration of fat and mild fluid. Incarcerated hernia is not excluded.  Correlate with site of pain.  2. Moderate distention of the partially visualized distal esophagus which is fluid-filled. Question related to reflux or esophageal dysfunction.  3. 4.3 cm hypodense area in the left lobe of the liver posteriorly indeterminate. Numerous cystic areas in the pancreas. Numerous renal cystic lesions bilaterally some of which appear complex warranting further evaluation. Next best imaging test for this   patient would likely be CT with and without IV contrast renal protocol on a nonemergent basis.    Chest x-ray 1/13:   Suspected developing peripheral infiltrate left midlung      2/29/2024: Pt missed last PT appointment due to being ill. Pt recently had visit to ER for feelings of lightheaded and shoulder pain; 2/20/2024. Pt reports administration of injection into the R shoulder which has offered pain relief. States that the L shoulder pain is more prominent now. Pt states that she rarely attempts to use her R shoulder. Pt states that she does feel physical therapy is helping her. She states that if she didn't do the exercises she was prescribed for home she feels she wouldn't be able to walk. She has called some of the numbers provided by PT at last tx session. She is trying to coordinate rides w/ transportation service. She reports throwing up at night and not being able to wear a sleep apnea machine d/t this problem. States that she has multiple doctors appointment that she needs to attend but they are all far away; she is working to coordinate rates. She states that she did talk to her psychologist regarding thoughts expressed at last PT visit and mentions the potential for group  therapy. States that this is actively being addressed in meetings w/ psychologist 2x/week.    Patient Goals  Patient goals for therapy: increased strength, decreased pain, improved balance, increased motion and independence with ADLs/IADLs  Patient goal: to become more limber and walk further without feeling exhausted.  Pain  Current pain rating: 3 (R shoulder)  At best pain ratin  At worst pain rating: 10    Social Support  Steps to enter house: yes (Has ramp access)  5    Treatments  Previous treatment: physical therapy  Current treatment: physical therapy        Objective    RESTING VITALS:  2024:  /70 mmHg  HR 60 BPM *Pacemaker*  2024   Pre-tx   /58 mmHg  HR 60 bpm  SpO2% = 92%    End of tx  / 60 mmHg  HR 62 bpm  SpO2% = 93%     AROM: sitting   R   L  R  L      2024  Shoulder flexion  40*   80  20*  75  Shoulder abduction  52*   70  35  55*  Functional sh. ER  Occiput*  C7  Ear  C4  Functional sh. IR  PSIS   PSIS  NT  NT    PROM: sitting   R   L  R  L      2024  Shoulder flexion  75*   70*  NT  NT  Shoulder abduction  70 firm EF  80  NT  NT    STRENGTH:    R   L  R  L      2024   Shoulder flexion  2+/5   2+/5  2+/5  2+/5   Shoulder abduction  2+/5   2+/5  2+/5  2+/5   Shoulder ER   3+/5*   3+/5*  3+/5  3+/5  Shoulder IR   3+/5*   3+/5*  3+/5  3+/5    Posture:   2024 Pt's sitting posture is kyphotic. Shoulders are anteriorly rounded. Decreased lumbar lordosis.   2024: Unchanged    Cervical Screen: cervical AROM limitations    2024  Flexion  Nil   Nil   Extension Max  max  R rotation Max*  Max*   L rotation Max*  Max*  R sidebend Mod  mod  L sidebend Mod   Mod   Retraction Max  max    Mechanical assessment:  2024 NT  2024: NT    Special Tests:   2024: NT   2024: NT    SHAHID Strength:  MMT      R  L  R  L      1/23/2024 1/23/2024 2/29/2024 2/29/2024   Hip flexion:    3+/5  3+/5  3+/5  3+/5  Hip extension:   NT  NT  NT  NT  Hip abduction (sit):   4/5  4/5  4/5  4/5  Knee extension:  4/5  4/5  4/5  4/5  Knee flexion:    4+/5  4+/5  4/5  4/5  Ankle dorsiflexion:  4/5  4/5  4/5  4/5  Ankle plantarflexion:   4/5  4/5  4/5  4/5    Flexibility:  1/23/2024  Hamstrings: Deferred d/t time restraint  Hip flexors: Deferred d/t time restraint  2/29/2024:   NT    Function:  1/23/2024:   Gait: decreased stride length, impaired speed, increased WB into RW, increased hip flexion bilaterally, unable to stand erect  Stairs: Deferred d/t time restraint  ADLs: difficulty reaching overhead, STS, bed mobility, ambulation  STS: pt with urinary incontinence on first STS, states that she is recovering from urinary tract infection. Is using diapers; stool has been firm. Pt with recent admission to ED 1/13 where she received CT scan of abdomen.   2/29/2024  Gait: no change  Stairs: NT  ADLs: difficulty reaching overhead, STS, bed mobility, ambulation  STS: BUE assist    Sensation  1/23/2024: Deferred d/t time restraint  2/29/2024: NT    Coordination  1/23/2024: NT  2/29/2024: NT    Cut off scores:  All data taken from [facility] Neuro Section or Rehab Measures    Ashraf: <46/56=falls risk  MDC: 6 pts  Age Norms:  60-69: M - 55   F - 55  70-79: M - 54   F - 53  80-89: M - 53   F - 50 5xSTS: Case et al 2010  MDC: 2.3 sec  Age Norms:  60-69: 11.1 sec  70-79: 12.6 sec  80-89: 14.8 sec   TUG  MDC: 4.14 sec  Cut off score:  >13.5 sec community dwelling adults  >32.2 sec frail elderly  <20 sec: I for basic transfers  >30: dependent for transfers 10 Meter Walk Test Norms: Kath and Jose et al 2011  20-29: M - 1.35 m/s   F - 1.34 m/s  30-39: M - 1.43 m/s   F - 1.34 m/s  40-49: M - 1.43 m/s   F - 1.39 m/s  50-59: M - 1.43 m/s   F - 1.31 m/s  60-69: M - 1.34 m/s   F - 1.24 m/s  70-79: M - 1.26 m/s   F - 1.13 m/s  80-89: M - 0.97 m/s   F -  0.94 m/s   FGA  MCID: 4 pts  Geriatrics/community < 22/30 fall risk  Geriatrics/community < 20/30 unexplained falls DGI  MDC: vestibular - 4 pts  MDC: geriatric/community - 3 pts  Falls risk <19/24   6 Minute Walk Test  Age Norms  60-69: M - 1876 ft (571.80 m)  F - 1765 ft (537.98 m)  70-79: M - 1729 ft (527.00 m)  F - 1545 ft (470.92 m)  80-89: M - 1368 ft (416.97 m)  F - 1286 ft (391.97 m) mCTSIB  Norm: 20-60 yrs  Eyes open firm: norm sway 0.21-0.48  Eyes closed firm: norm sway 0.48-0.99  Eyes open foam: norm sway 0.38-0.71  Eyes closed foam: norm sway 0.70-2.22       Outcome Measures Initial Eval  1/23/2024 2/29/2024       5xSTS 31 sec BUE; RPE 8/10 40.4 sec BUE        TUG 61 sec BUE RW 38 sec BUE w/ rollator       10 meter (Deferred) ft/sec  (Deferred)  m/s NT       GRAHAM (Deferred) /56 NT       FGA (Deferred) /30 NT       DGI (Deferred) /24 NT       mCTSIB  - FTEO (firm)  - FTEC (firm)  - FTEO (foam)  - FTEC (foam) 1/25/2024  50 sec mod sway  25 seconds max sway   NT  NT NT       6MWT (Deferred)  meters  NT       ABC         DHI              Precautions:   *PACEMAKER*  *FALL RISK*    Referral: Help w/ frozen shoulder and help strengthen upper and lower body  Past Medical History:   Diagnosis Date    Arthritis     Cardiac disease     Gout     Lyme disease      Past Surgical History:   Procedure Laterality Date    CARDIAC PACEMAKER PLACEMENT      CHOLECYSTECTOMY      HYSTERECTOMY      JOINT REPLACEMENT       SOC: 1/23/2024  FOTO: 1/23/2024  POC Expiration: 4/16/2024   Daily Treatment Log  Date: 2/8/2024 2/12/2024 2/15/2024 2/29/2024    Visit#/ auth:  7 8 9    Objective Measures        Vitals Pre-tx   BP: 140/80  mmHg   HR 60 bpm  SpO2: 97    Mid tx:  HR 61  SpO2: 88-95%      Post -tx BP: 132/64 mmHg  To 130/70 mmHg  SpO2: 95%  HR: 60 Pre-tx   158/80mmHg  HR: 66 bpm  SpO2 92%    Mid tx   Check #1  HR 60 bpm  BP: 130/62 mmHg    Check #2  BP: 130/80 mmHg    Post -tx BP: SpO2 % 88 to 92%    /60 mmHg w/ symptoms  "post TE, transitioned to supine  BP: 150/80 mmHg; symptoms resolving    To upright BP: 150/80 mmHg    See re- eval   objective    mCTSIB        Manuals                                            Neuro Re-Ed 10'       FT EO/EC (CGA)        Tandem        Backward walking +CS/CGA        Tandem walking +CS/CGA         High march length of rail  X 25' w/ one standing rest, rollator and CGA on gait belt                         Ther Ex 20' 30'  45'     LAQ 3\" x 10 R/L  1 x 15 R/L  1 x 15 R/L unsupported       Seated marching 3\" x 10 R/L  1 x 15 R/L   1 x 15 R/L unsupported      Scapular retraction w/ low row         Clamshell hook-lying HOB elevated Unsupported sit 1 x 15 B/L RTB Unsupported sit 1 x 15 B/L RTB       Bridges         Std hip ext/ abd 1 x 10 ea. W/ RTB at knee       Pulleys        Shoulder Cane ROM        Scapular retraction        Seated Ws        Objective measures: AROM, MMT, 5x STS, TUG    KE    EDUCATION: Pathology, review of impairements, prognosis, activity modification, POC, and HEP    KE    Ther Activity 10' 10' 25'      STS 2 x 5 from raised table w/ CS w/ RTB at knee 1 x 8  from raised table w/ CS       Ambulation in clinic        Patient education on safety at home        Gait Training                          Modalities                          HEP:   Access Code: [email or web address]  URL: [email or web address]  Date: 01/29/2024  Prepared by: Radha Castro    Exercises  - Seated Long Arc Quad  - 2 x daily - 1 sets - 10 reps  - Seated March  - 2 x daily - 1 sets - 10 reps  - Sit to Stand with Counter Support  - 2 x daily - 1 sets - 3 reps           "

## 2024-03-05 ENCOUNTER — TELEPHONE (OUTPATIENT)
Dept: PHYSICAL THERAPY | Facility: CLINIC | Age: 89
End: 2024-03-05

## 2024-03-05 ENCOUNTER — APPOINTMENT (OUTPATIENT)
Dept: PHYSICAL THERAPY | Facility: CLINIC | Age: 89
End: 2024-03-05
Payer: MEDICARE

## 2024-03-05 NOTE — TELEPHONE ENCOUNTER
Patient just rohini stating her transportation has the flu - cx'd today 3/5 - hoped to be here for her next appt.

## 2024-03-14 ENCOUNTER — OFFICE VISIT (OUTPATIENT)
Dept: PHYSICAL THERAPY | Facility: CLINIC | Age: 89
End: 2024-03-14
Payer: MEDICARE

## 2024-03-14 DIAGNOSIS — M25.512 LEFT SHOULDER PAIN, UNSPECIFIED CHRONICITY: Primary | ICD-10-CM

## 2024-03-14 DIAGNOSIS — M25.511 RIGHT SHOULDER PAIN, UNSPECIFIED CHRONICITY: ICD-10-CM

## 2024-03-14 DIAGNOSIS — R53.1 GENERALIZED WEAKNESS: ICD-10-CM

## 2024-03-14 DIAGNOSIS — M25.611 SHOULDER STIFFNESS, RIGHT: ICD-10-CM

## 2024-03-14 PROCEDURE — 97110 THERAPEUTIC EXERCISES: CPT

## 2024-03-14 NOTE — PROGRESS NOTES
Daily Note     Today's date: 3/14/2024  Patient name: Kristen Maguire  : 1935  MRN: [id number]  Referring provider: Mary Fajardo MD  Dx:   Encounter Diagnosis     ICD-10-CM    1. Left shoulder pain, unspecified chronicity  M25.512       2. Right shoulder pain, unspecified chronicity  M25.511       3. Generalized weakness  R53.1       4. Shoulder stiffness, right  M25.611                      Subjective: Pt states that her shoulders feel okay, she just cannot lift them very high. She has been doing her seated home exercises; has not done STS but is getting up and down all day. Pt offers minimal complaints at start of session.       Objective: See treatment diary below      Assessment: Progressed seated and standing therapeutic exercise today; pt denies soreness at completion of session. Pt introduced to side stepping at the rail with CS and BUE assist on rail. Pt instructed to monitor how she feels and to report this to PT next visit for appropriate progression/ regression next visit. HEP updated and reviewed. Tolerated treatment well. Patient would benefit from continued PT.       Plan: Continue per plan of care.  Progress treatment as tolerated.       Precautions:   *PACEMAKER*  *FALL RISK*    Referral: Help w/ frozen shoulder and help strengthen upper and lower body  Past Medical History:   Diagnosis Date    Arthritis     Cardiac disease     Gout     Lyme disease      Past Surgical History:   Procedure Laterality Date    CARDIAC PACEMAKER PLACEMENT      CHOLECYSTECTOMY      HYSTERECTOMY      JOINT REPLACEMENT       SOC: 2024  FOTO: 2024  POC Expiration: 2024   Daily Treatment Log  Date:   2/15/2024 2024 3/14/2024   Visit#/ auth:   8 9 10   Objective Measures        Vitals    See re- eval   objective Pre-tx   140/80mmHg  HR: 90 bpm  SpO2 93%      Mid tx:  HR 60 bpm  SpO2: 98%  120/70 mmHg      Post -tx BP:  To 140/70 mmHg  SpO2: 94%  HR: 60 bpm           mCTSIB        Manuals                                             Neuro Re-Ed     5'   FT EO/EC (CGA)     30 sec EO x 3 w/ CS/ CGA   Tandem        Backward walking +CS/CGA        Tandem walking +CS/CGA         High march length of rail                           Ther Ex    45' 35'    LAQ     2 x 15 R/L alt   HR     3 x 20 seated    1x10 R/L alt std w/ rollator locked    Seated marching     1 x 10 R/L alt    1x10 R/L alt unsupported sit    2 x 10 R/L std   Scapular retraction w/ low row      1 x 10 YTB   Clamshell hook-lying HOB elevated     Unsupported sit 2 x 15 B/L YTB    Bridges         Std hip ext/ abd        Pulleys        Shoulder Cane ROM     Flex unsupported sit 2 x 10    Scapular retraction     Seated W vs YTB 2 x 15   Seated Ws        Objective measures: AROM, MMT, 5x STS, TUG    KE    EDUCATION: Pathology, review of impairements, prognosis, activity modification, POC, and HEP    KE HEP updated and reviewed   Ther Activity   25'  5'     STS     1 x 5 w/ CS/ CGA    Ambulation in clinic        Patient education on safety at home        Gait Training                          Modalities                          HEP:   Access Code: [email or web address]  URL: [email or web address]  Date: 03/14/2024  Prepared by: Radha Castro    Exercises  - Seated Long Arc Quad  - 2 x daily - 1 sets - 10 reps  - Seated March  - 2 x daily - 1 sets - 10 reps  - Sit to Stand with Counter Support  - 2 x daily - 1 sets - 3 reps  - Standing March with Counter Support  - 2 x daily - 1 sets - 10 reps  - Seated Heel Raise  - 2 x daily - 1 sets - 10 reps  - Seated Bilateral Shoulder External Rotation with Resistance  - 1 x daily - 1 sets - 10 reps

## 2024-03-20 ENCOUNTER — OFFICE VISIT (OUTPATIENT)
Dept: PHYSICAL THERAPY | Facility: CLINIC | Age: 89
End: 2024-03-20
Payer: MEDICARE

## 2024-03-20 DIAGNOSIS — M25.512 LEFT SHOULDER PAIN, UNSPECIFIED CHRONICITY: Primary | ICD-10-CM

## 2024-03-20 DIAGNOSIS — M25.611 SHOULDER STIFFNESS, RIGHT: ICD-10-CM

## 2024-03-20 DIAGNOSIS — R53.1 GENERALIZED WEAKNESS: ICD-10-CM

## 2024-03-20 DIAGNOSIS — M25.511 RIGHT SHOULDER PAIN, UNSPECIFIED CHRONICITY: ICD-10-CM

## 2024-03-20 PROCEDURE — 97140 MANUAL THERAPY 1/> REGIONS: CPT

## 2024-03-20 PROCEDURE — 97110 THERAPEUTIC EXERCISES: CPT

## 2024-03-20 NOTE — PROGRESS NOTES
Daily Note     Today's date: 3/20/2024  Patient name: Kristen Maguire  : 1935  MRN: [id number]  Referring provider: Mary Fajardo MD  Dx:   Encounter Diagnosis     ICD-10-CM    1. Left shoulder pain, unspecified chronicity  M25.512       2. Right shoulder pain, unspecified chronicity  M25.511       3. Generalized weakness  R53.1       4. Shoulder stiffness, right  M25.611                      Subjective: Pt states that she did not have a chance to perform her home exercises because she was sick last week.       Objective: See treatment diary below      Assessment: Pt demonstrates improved endurance with LE exercises in supported sitting. She requires manual assistance with shoulder AROM exercises; greater assistance required with RUE. Provided manual assistance to RUE with seated shoulder external rotation vs YTB. Pt unable to complete full set of 5 reps during second set of STS; able to complete 3 d/t fatigue. Tolerated treatment well. Patient would benefit from continued PT.       Plan: Continue per plan of care.  Progress treatment as tolerated.       Precautions:   *PACEMAKER*  *FALL RISK*    Referral: Help w/ frozen shoulder and help strengthen upper and lower body  Past Medical History:   Diagnosis Date    Arthritis     Cardiac disease     Gout     Lyme disease      Past Surgical History:   Procedure Laterality Date    CARDIAC PACEMAKER PLACEMENT      CHOLECYSTECTOMY      HYSTERECTOMY      JOINT REPLACEMENT       SOC: 2024  FOTO: 2024  POC Expiration: 2024   Daily Treatment Log  Date: 3/20/2024  2/15/2024 2024 3/14/2024   Visit#/ auth: 11  8 9 10   Objective Measures        Vitals Pre-tx   120/74 mmHg  HR: 65 bpm  SpO2 92%    Mid tx:  HR 65 bpm  SpO2: 93%    Post -tx BP:  To 120/64 mmHg  SpO2: 93%  HR: 63 bpm   See re- eval   objective Pre-tx   140/80mmHg  HR: 90 bpm  SpO2 93%      Mid tx:  HR 60 bpm  SpO2: 98%  120/70 mmHg      Post -tx BP:  To 140/70 mmHg  SpO2: 94%  HR: 60  bpm           mCTSIB        Manuals 10'        Shoulder flex AROM w/ man assist 1 x 10 R/L         Shoulder abd AROM w/ man assist 1 x 10 R/L                          Neuro Re-Ed     5'   FT EO/EC (CGA)     30 sec EO x 3 w/ CS/ CGA   Tandem        Backward walking +CS/CGA        Tandem walking +CS/CGA         High march length of rail                           Ther Ex 30'   45' 35'   Cervical extension AROM  1 x 10        LAQ 2 x 15 R/L alt    2 x 15 R/L alt   HR 2 x 20 seated    3 x 20 seated    1x10 R/L alt std w/ rollator locked   TR 1 x 20 seated        Seated marching 1 x 10 R/L alt      1x10 R/L alt unsupported sit    1 x 10 R/L std    1 x 10 R/L alt    1x10 R/L alt unsupported sit    2 x 10 R/L std   Std knee flex 1 x 10 R/L w/ RW and CGA       Scapular retraction w/ low row  2 x 10 YTB    1 x 10 YTB   Clamshell hook-lying HOB elevated Unsupported sit 2 x 15 B/L YTB    Unsupported sit 2 x 15 B/L YTB    Bridges         Std hip ext/ abd        Pulleys        Shoulder Cane ROM Flex unsupported sit 2 x 10     Flex unsupported sit 2 x 10    Scapular retraction Seated W vs YTB 2 x 10 w/ man assist on RUE    Seated W vs YTB 2 x 15   Objective measures: AROM, MMT, 5x STS, TUG    KE    EDUCATION: Pathology, review of impairements, prognosis, activity modification, POC, and HEP    KE HEP updated and reviewed   Ther Activity 5'  25'  5'     STS 1 x 5 w/ CS/ CGA, BUE    1 x 3 w/ CS/ CGA, BUE    1 x 5 w/ CS/ CGA    Ambulation in clinic        Patient education on safety at home        Gait Training                          Modalities                          HEP:   Access Code: [email or web address]  URL: [email or web address]  Date: 03/14/2024  Prepared by: Radha Castro    Exercises  - Seated Long Arc Quad  - 2 x daily - 1 sets - 10 reps  - Seated March  - 2 x daily - 1 sets - 10 reps  - Sit to Stand with Counter Support  - 2 x daily - 1 sets - 3 reps  - Standing March with Counter Support  - 2 x daily - 1 sets - 10  reps  - Seated Heel Raise  - 2 x daily - 1 sets - 10 reps  - Seated Bilateral Shoulder External Rotation with Resistance  - 1 x daily - 1 sets - 10 reps

## 2024-03-21 ENCOUNTER — APPOINTMENT (OUTPATIENT)
Dept: PHYSICAL THERAPY | Facility: CLINIC | Age: 89
End: 2024-03-21
Payer: MEDICARE

## 2024-03-28 ENCOUNTER — OFFICE VISIT (OUTPATIENT)
Dept: PHYSICAL THERAPY | Facility: CLINIC | Age: 89
End: 2024-03-28
Payer: MEDICARE

## 2024-03-28 DIAGNOSIS — R53.1 GENERALIZED WEAKNESS: ICD-10-CM

## 2024-03-28 DIAGNOSIS — M25.611 SHOULDER STIFFNESS, RIGHT: ICD-10-CM

## 2024-03-28 DIAGNOSIS — M25.512 LEFT SHOULDER PAIN, UNSPECIFIED CHRONICITY: Primary | ICD-10-CM

## 2024-03-28 DIAGNOSIS — M25.511 RIGHT SHOULDER PAIN, UNSPECIFIED CHRONICITY: ICD-10-CM

## 2024-03-28 PROCEDURE — 97110 THERAPEUTIC EXERCISES: CPT

## 2024-03-28 PROCEDURE — 97530 THERAPEUTIC ACTIVITIES: CPT

## 2024-03-28 PROCEDURE — 97140 MANUAL THERAPY 1/> REGIONS: CPT

## 2024-03-28 NOTE — PROGRESS NOTES
"Daily Note     Today's date: 3/28/2024  Patient name: Kristen Maguire  : 1935  MRN: [id number]  Referring provider: Mary Fajardo MD  Dx:   Encounter Diagnosis     ICD-10-CM    1. Left shoulder pain, unspecified chronicity  M25.512       2. Right shoulder pain, unspecified chronicity  M25.511       3. Generalized weakness  R53.1       4. Shoulder stiffness, right  M25.611                      Subjective: Pt states that she \"spent the whole week feeling like an old lady.\" She did not perform her updated HEP but she does state that she performs her LAQs frequently.       Objective: See treatment diary below      Assessment: Pt able to complete 5 STS repetitively; held on an additional set due fatigue. Pt demonstrates small improvements in motor control and ROM with shoulder AROM as sets progressed. Pt educated on need to strengthen shoulder musculature for improved ability to reach UE without PT assistance. Pt instructed to perform HEP. Tolerated treatment well. Patient would benefit from continued PT.       Plan: Continue per plan of care.  Progress treatment as tolerated.       Precautions:   *PACEMAKER*  *FALL RISK*    Referral: Help w/ frozen shoulder and help strengthen upper and lower body  Past Medical History:   Diagnosis Date    Arthritis     Cardiac disease     Gout     Lyme disease      Past Surgical History:   Procedure Laterality Date    CARDIAC PACEMAKER PLACEMENT      CHOLECYSTECTOMY      HYSTERECTOMY      JOINT REPLACEMENT       SOC: 2024  FOTO: 2024  POC Expiration: 2024   Daily Treatment Log  Date: 3/20/2024 3/28/2024  2024 3/14/2024   Visit#/ auth: 11 12  9 10   Objective Measures        Vitals Pre-tx   120/74 mmHg  HR: 65 bpm  SpO2 92%    Mid tx:  HR 65 bpm  SpO2: 93%    Post -tx BP:  To 120/64 mmHg  SpO2: 93%  HR: 63 bpm Mid tx:  HR 60 bpm  SpO2: 95%  /62 mmHg    Post -tx BP:  To 130/60 mmHg  SpO2: 94%  HR: 71 bpm    See re- eval   objective Pre-tx "   140/80mmHg  HR: 90 bpm  SpO2 93%      Mid tx:  HR 60 bpm  SpO2: 98%  120/70 mmHg      Post -tx BP:  To 140/70 mmHg  SpO2: 94%  HR: 60 bpm           mCTSIB        Manuals 10' 10'       Shoulder flex AROM w/ man assist 1 x 10 R/L  2 x 10 R/L        Shoulder abd AROM w/ man assist 1 x 10 R/L  2 x 10 R/L                         Neuro Re-Ed     5'   FT EO/EC (CGA)     30 sec EO x 3 w/ CS/ CGA   Tandem        Backward walking +CS/CGA        Tandem walking +CS/CGA         High march length of rail                           Ther Ex 30' 20'  45' 35'   Cervical extension AROM  1 x 10 2 x 10       LAQ 2 x 15 R/L alt 2 x 15 R/L alt   2 x 15 R/L alt   HR 2 x 20 seated 2 x 20 seated   3 x 20 seated    1x10 R/L alt std w/ rollator locked   TR 1 x 20 seated 2 x 20 seated       Seated marching 1 x 10 R/L alt      1x10 R/L alt unsupported sit    1 x 10 R/L std 2 x 15 R/L alt    2 x 10 R/L std w/ CS   1 x 10 R/L alt    1x10 R/L alt unsupported sit    2 x 10 R/L std   Std knee flex 1 x 10 R/L w/ RW and CGA       Scapular retraction w/ low row  2 x 10 YTB    1 x 10 YTB   Clamshell hook-lying HOB elevated Unsupported sit 2 x 15 B/L YTB    Unsupported sit 2 x 15 B/L YTB    Bridges         Std hip ext/ abd        Pulleys        Shoulder Cane ROM Flex unsupported sit 2 x 10  B/L flex 3 x 10    Flex unsupported sit 2 x 10    Scapular retraction Seated W vs YTB 2 x 10 w/ man assist on RUE Seated W vs YTB 2 x 15    Seated W vs YTB 2 x 15   Objective measures: AROM, MMT, 5x STS, TUG    KE    EDUCATION: Pathology, review of impairements, prognosis, activity modification, POC, and HEP    KE HEP updated and reviewed   Ther Activity 5' 10'   5'     STS 1 x 5 w/ CS/ CGA, BUE    1 x 3 w/ CS/ CGA, BUE 1 x 5 STS w/ CS   1 x 5 w/ CS/ CGA    Ambulation in clinic  50 ft x 3 w/ RW and CS      Patient education on safety at home        Gait Training                          Modalities                          HEP:   Access Code: [email or web address]  URL:  [email or web address]  Date: 03/14/2024  Prepared by: Radha Castro    Exercises  - Seated Long Arc Quad  - 2 x daily - 1 sets - 10 reps  - Seated March  - 2 x daily - 1 sets - 10 reps  - Sit to Stand with Counter Support  - 2 x daily - 1 sets - 3 reps  - Standing March with Counter Support  - 2 x daily - 1 sets - 10 reps  - Seated Heel Raise  - 2 x daily - 1 sets - 10 reps  - Seated Bilateral Shoulder External Rotation with Resistance  - 1 x daily - 1 sets - 10 reps

## 2024-04-03 ENCOUNTER — OFFICE VISIT (OUTPATIENT)
Dept: PHYSICAL THERAPY | Facility: CLINIC | Age: 89
End: 2024-04-03
Payer: MEDICARE

## 2024-04-03 DIAGNOSIS — M25.512 LEFT SHOULDER PAIN, UNSPECIFIED CHRONICITY: Primary | ICD-10-CM

## 2024-04-03 DIAGNOSIS — M25.611 SHOULDER STIFFNESS, RIGHT: ICD-10-CM

## 2024-04-03 DIAGNOSIS — M25.511 RIGHT SHOULDER PAIN, UNSPECIFIED CHRONICITY: ICD-10-CM

## 2024-04-03 DIAGNOSIS — R53.1 GENERALIZED WEAKNESS: ICD-10-CM

## 2024-04-03 PROCEDURE — 97110 THERAPEUTIC EXERCISES: CPT

## 2024-04-03 NOTE — PROGRESS NOTES
Daily Note     Today's date: 4/3/2024  Patient name: Kristen Maguire  : 1935  MRN: [id number]  Referring provider: Mary Fajardo MD  Dx:   Encounter Diagnosis     ICD-10-CM    1. Left shoulder pain, unspecified chronicity  M25.512       2. Right shoulder pain, unspecified chronicity  M25.511       3. Generalized weakness  R53.1       4. Shoulder stiffness, right  M25.611                      Subjective: Pt arrives 10 minutes late to PT session. Pt states that she felt good after last PT session. States that her R arm has not been working. Pt reports chest pain that comes and goes rated as 5/10 mid PT session. Pt states that it may be more on L side. Pt states that she noticed this months ago. Pain typically subsides momentarily; returned to 0/10 within minutes.     Objective: See treatment diary below      Assessment: Pt reports improving chest pain level with seated thoracic extension stretch. Chest pain reports mid session rated as 5/10 subsided to 0/10 w/in minutes. Pt instructed to contact MD in regard to chest pain. Pt has MD appointment tomorrow and plans to inform doctor; informed pt that severe constant chest pain is a medical emergency. Vitals WNL. Pt continues to lack strength w/ R shoulder external rotation and flexion requiring manual assistance and contralateral UE assist  with these exercises respectively. Tolerated treatment well. Patient would benefit from continued PT.       Plan: Continue per plan of care.  Progress treatment as tolerated.       Precautions:   *PACEMAKER*  *FALL RISK*    Referral: Help w/ frozen shoulder and help strengthen upper and lower body  Past Medical History:   Diagnosis Date    Arthritis     Cardiac disease     Gout     Lyme disease      Past Surgical History:   Procedure Laterality Date    CARDIAC PACEMAKER PLACEMENT      CHOLECYSTECTOMY      HYSTERECTOMY      JOINT REPLACEMENT       SOC: 2024  FOTO: 2024  POC Expiration: 2024   Daily Treatment  "Log  Date: 3/20/2024 3/28/2024 4/3/2024  3/14/2024   Visit#/ auth: 11 12 13  10   Objective Measures        Vitals Pre-tx   120/74 mmHg  HR: 65 bpm  SpO2 92%    Mid tx:  HR 65 bpm  SpO2: 93%    Post -tx BP:  To 120/64 mmHg  SpO2: 93%  HR: 63 bpm Mid tx:  HR 60 bpm  SpO2: 95%  /62 mmHg    Post -tx BP:  To 130/60 mmHg  SpO2: 94%  HR: 71 bpm   Mid tx:  HR 76 bpm  SpO2: 93%  BP  120/64 mmHg      Mid tx:  HR 70bpm  SpO2 92%      End of session BP:  To 120/62 mmHg  SpO2: 92%  HR: 71 bpm  Pre-tx   140/80mmHg  HR: 90 bpm  SpO2 93%      Mid tx:  HR 60 bpm  SpO2: 98%  120/70 mmHg      Post -tx BP:  To 140/70 mmHg  SpO2: 94%  HR: 60 bpm           mCTSIB        Manuals 10' 10'       Shoulder flex AROM w/ man assist 1 x 10 R/L  2 x 10 R/L        Shoulder abd AROM w/ man assist 1 x 10 R/L  2 x 10 R/L                         Neuro Re-Ed     5'   FT EO/EC (CGA)     30 sec EO x 3 w/ CS/ CGA   Tandem        Backward walking +CS/CGA        Tandem walking +CS/CGA         High march length of rail                           Ther Ex 30' 20' 30'  35'   Cervical extension AROM  1 x 10 2 x 10 1 x 10     Thoracic extension/ pec stretch sitting   40\" x 1      LAQ 2 x 15 R/L alt 2 x 15 R/L alt 1 x 15 R/L alt    1 x 15 R/L alt unsupported sit  2 x 15 R/L alt   HR 2 x 20 seated 2 x 20 seated 1 x 20 seated    1 x 20 R/L alt unsupported sit  3 x 20 seated    1x10 R/L alt std w/ rollator locked   TR 1 x 20 seated 2 x 20 seated 1 x 20 seated    1 x 20 R/L alt unsupported sit      Seated marching 1 x 10 R/L alt      1x10 R/L alt unsupported sit    1 x 10 R/L std 2 x 15 R/L alt    2 x 10 R/L std w/ CS 1 x 15 R/L alt    1 x 15 R/L alt unsupported sit  1 x 10 R/L alt    1x10 R/L alt unsupported sit    2 x 10 R/L std   Std knee flex 1 x 10 R/L w/ RW and CGA       Scapular retraction w/ low row  2 x 10 YTB  1 x 15 YTB supported sit  1 x 10 YTB   Clamshell hook-lying HOB elevated Unsupported sit 2 x 15 B/L YTB  Supported sit 2 x 15 RTB  Unsupported " sit 2 x 15 B/L YTB    Bridges         Std hip ext/ abd        Pulleys        Shoulder Cane ROM Flex unsupported sit 2 x 10  B/L flex 3 x 10  B/L flex 2 x 10   Flex unsupported sit 2 x 10    Scapular retraction Seated W vs YTB 2 x 10 w/ man assist on RUE Seated W vs YTB 2 x 15  Seated W vs YTB 2 x 15 w/ man assist for RUE  Seated W vs YTB 2 x 15   Objective measures: AROM, MMT, 5x STS, TUG        EDUCATION: Pathology, review of impairements, prognosis, activity modification, POC, and HEP     HEP updated and reviewed   Ther Activity 5' 10' 5'  5'     STS 1 x 5 w/ CS/ CGA, BUE    1 x 3 w/ CS/ CGA, BUE 1 x 5 STS w/ CS   1 x 5 w/ CS/ CGA   Ambulation in clinic  50 ft x 3 w/ RW and  ft x 1 w/ RW and CS; intermittent standing rest     Patient education on safety at home        Gait Training                          Modalities                          HEP:   Access Code: [email or web address]  URL: [email or web address]  Date: 03/14/2024  Prepared by: Radha Castro    Exercises  - Seated Long Arc Quad  - 2 x daily - 1 sets - 10 reps  - Seated March  - 2 x daily - 1 sets - 10 reps  - Sit to Stand with Counter Support  - 2 x daily - 1 sets - 3 reps  - Standing March with Counter Support  - 2 x daily - 1 sets - 10 reps  - Seated Heel Raise  - 2 x daily - 1 sets - 10 reps  - Seated Bilateral Shoulder External Rotation with Resistance  - 1 x daily - 1 sets - 10 reps

## 2024-04-04 ENCOUNTER — OFFICE VISIT (OUTPATIENT)
Dept: GASTROENTEROLOGY | Facility: CLINIC | Age: 89
End: 2024-04-04
Payer: MEDICARE

## 2024-04-04 ENCOUNTER — OFFICE VISIT (OUTPATIENT)
Dept: PHYSICAL THERAPY | Facility: CLINIC | Age: 89
End: 2024-04-04
Payer: MEDICARE

## 2024-04-04 VITALS
BODY MASS INDEX: 35.87 KG/M2 | DIASTOLIC BLOOD PRESSURE: 71 MMHG | WEIGHT: 209 LBS | SYSTOLIC BLOOD PRESSURE: 131 MMHG | HEART RATE: 60 BPM | OXYGEN SATURATION: 94 %

## 2024-04-04 DIAGNOSIS — R53.1 GENERALIZED WEAKNESS: ICD-10-CM

## 2024-04-04 DIAGNOSIS — M25.511 RIGHT SHOULDER PAIN, UNSPECIFIED CHRONICITY: ICD-10-CM

## 2024-04-04 DIAGNOSIS — R13.10 DYSPHAGIA, UNSPECIFIED TYPE: Primary | ICD-10-CM

## 2024-04-04 DIAGNOSIS — M25.611 SHOULDER STIFFNESS, RIGHT: ICD-10-CM

## 2024-04-04 DIAGNOSIS — M25.512 LEFT SHOULDER PAIN, UNSPECIFIED CHRONICITY: Primary | ICD-10-CM

## 2024-04-04 PROCEDURE — 99204 OFFICE O/P NEW MOD 45 MIN: CPT | Performed by: INTERNAL MEDICINE

## 2024-04-04 PROCEDURE — 97110 THERAPEUTIC EXERCISES: CPT

## 2024-04-04 PROCEDURE — 97530 THERAPEUTIC ACTIVITIES: CPT

## 2024-04-04 RX ORDER — COLCHICINE 0.6 MG/1
TABLET ORAL
COMMUNITY
Start: 2024-01-04

## 2024-04-04 RX ORDER — ESTRADIOL 0.1 MG/G
CREAM VAGINAL
COMMUNITY
Start: 2024-02-22

## 2024-04-04 RX ORDER — FEBUXOSTAT 40 MG/1
TABLET, FILM COATED ORAL
COMMUNITY
Start: 2024-02-22

## 2024-04-04 RX ORDER — FUROSEMIDE 40 MG/1
TABLET ORAL
COMMUNITY
Start: 2024-02-08

## 2024-04-04 RX ORDER — ALLOPURINOL 100 MG/1
TABLET ORAL
COMMUNITY
Start: 2024-03-19

## 2024-04-04 RX ORDER — NITROFURANTOIN 25; 75 MG/1; MG/1
CAPSULE ORAL
COMMUNITY
Start: 2024-02-01

## 2024-04-04 RX ORDER — CARVEDILOL 3.12 MG/1
TABLET ORAL
COMMUNITY
Start: 2024-02-20

## 2024-04-04 NOTE — PROGRESS NOTES
Consultation -  [facility]  Kristen Maguire 1935 female         ASSESSMENT & PLAN:    Dysphagia  Patient reports having more problems with bringing up mucus and some food particles at night.  Possible secondary to esophageal dysmotility.  Rule out any peptic stricture or other lesions including malignancy.    -Had a long discussion with patient and her daughter in detail explaining the different possible etiologies    -Schedule barium swallow    -Patient reports having testing at the [facility].  Will get the reports and decide on upper endoscopy depending on the findings and also depending on the barium swallow results.  Patient understands increased risks because of her age and also being on the blood thinners.    -Advised to chew well before swallowing and eat at her own pace    -Patient was explained about the lifestyle and dietary modifications.  Advised to avoid fatty foods, chocolates, caffeine, alcohol and any other triggering foods.  Avoid eating for at least 3 hours before going to bed.      Chief Complaint: Swallowing difficulty    HPI: 88-year-old female with history of atrial fibrillation on anticoagulation With Eliquis, CHF, mood disorder came in along with her daughter because of symptoms of bringing up mucus at night.  She denies any choking or coughing during meal.  She thinks the food goes down but comes back as small pieces and also a lot of mucus at nighttime.  She used CPAP.  Denies any abdominal pain, nausea or vomiting.  Good appetite, no recent weight loss.  Regular bowel movements and denies any blood or mucus in the stool    She tried using pantoprazole and Pepcid for acid reflux without any significant help.  Denies any acid reflux.    She reports having these issues for a long time and was seen by different gastroenterologists at [facility].  She remembers having barium swallow and upper endoscopies.    REVIEW OF SYSTEMS: Review of  Systems   Constitutional:  Negative for activity change, appetite change, chills, diaphoresis, fatigue, fever and unexpected weight change.   HENT:  Negative for ear discharge, ear pain, facial swelling, hearing loss, nosebleeds, sore throat, tinnitus and voice change.    Eyes:  Negative for pain, discharge, redness, itching and visual disturbance.   Respiratory:  Negative for apnea, cough, chest tightness, shortness of breath and wheezing.    Cardiovascular:  Positive for chest pain. Negative for palpitations.   Gastrointestinal:         As noted in HPI   Endocrine: Negative for cold intolerance, heat intolerance and polyuria.   Genitourinary:  Negative for difficulty urinating, dysuria, flank pain, hematuria and urgency.   Musculoskeletal:  Positive for arthralgias and gait problem. Negative for back pain, joint swelling and myalgias.   Skin:  Negative for rash and wound.   Neurological:  Negative for dizziness, tremors, seizures, speech difficulty, light-headedness, numbness and headaches.   Hematological:  Negative for adenopathy. Does not bruise/bleed easily.   Psychiatric/Behavioral:  Negative for agitation, behavioral problems and confusion. The patient is not nervous/anxious.         Past Medical History:   Diagnosis Date    Arthritis     Cardiac disease     Gout     Lyme disease       Past Surgical History:   Procedure Laterality Date    CARDIAC PACEMAKER PLACEMENT      CHOLECYSTECTOMY      HYSTERECTOMY      JOINT REPLACEMENT       Social History     Socioeconomic History    Marital status:      Spouse name: Not on file    Number of children: Not on file    Years of education: Not on file    Highest education level: Not on file   Occupational History    Not on file   Tobacco Use    Smoking status: Never    Smokeless tobacco: Never   Vaping Use    Vaping status: Never Used   Substance and Sexual Activity    Alcohol use: Never    Drug use: No    Sexual activity: Not on file   Other Topics Concern     Not on file   Social History Narrative    Not on file     Social Determinants of Health     Financial Resource Strain: Not on file   Food Insecurity: No Food Insecurity (10/27/2023)    Hunger Vital Sign     Worried About Running Out of Food in the Last Year: Never true     Ran Out of Food in the Last Year: Never true   Transportation Needs: Not on file   Physical Activity: Not on file   Stress: Not on file   Social Connections: Not on file   Intimate Partner Violence: Not on file   Housing Stability: Low Risk  (10/27/2023)    Housing Stability Vital Sign     Unable to Pay for Housing in the Last Year: No     Number of Places Lived in the Last Year: 1     Unstable Housing in the Last Year: No     History reviewed. No pertinent family history.  Aspirin, Chocolate - food allergy, Ciprofloxacin, Doxycycline, Iodinated contrast media, Peanut oil - food allergy, Penicillins, Soy allergy - food allergy, Soybean oil - food allergy, and Erythromycin base  Current Outpatient Medications   Medication Sig Dispense Refill    allopurinol (ZYLOPRIM) 100 mg tablet       Apixaban (ELIQUIS PO) Take 5 mg by mouth 2 (two) times a day      bisacodyl (bisacodyl) 5 mg EC tablet Take 5 mg by mouth daily as needed for constipation      carvedilol (COREG) 3.125 mg tablet       colchicine (COLCRYS) 0.6 mg tablet       Diclofenac Sodium (VOLTAREN) 1 % Apply 2 g topically 4 (four) times a day 4 g 0    estradiol (ESTRACE) 0.1 mg/g vaginal cream Not taking currently but will 4/4/24      furosemide (LASIX) 40 mg tablet       montelukast (SINGULAIR) 10 mg tablet       nitrofurantoin (MACROBID) 100 mg capsule       risperiDONE (RisperDAL) 0.25 mg tablet Take 0.25 mg by mouth daily      sertraline (ZOLOFT) 25 mg tablet Take 25 mg by mouth daily      acetaminophen (TYLENOL) 325 mg tablet Take 650 mg by mouth every 6 (six) hours as needed for mild pain (Patient not taking: Reported on 4/4/2024)      albuterol (PROVENTIL HFA,VENTOLIN HFA) 90 mcg/act  inhaler  (Patient not taking: Reported on 4/4/2024)      carvedilol (COREG) 6.25 mg tablet Take 6.25 mg by mouth in the morning (Patient not taking: Reported on 4/4/2024)      clotrimazole (LOTRIMIN) 1 % cream Apply topically 2 (two) times a day (Patient not taking: Reported on 1/13/2024) 30 g 0    dextromethorphan-guaiFENesin (ROBITUSSIN DM)  mg/5 mL syrup Take 10 mL by mouth every 4 (four) hours as needed for cough or congestion (Patient not taking: Reported on 1/13/2024)  0    docusate sodium (COLACE) 100 mg capsule Take 100 mg by mouth 2 (two) times a day      famotidine (PEPCID) 40 MG tablet Take 40 mg by mouth daily      febuxostat (ULORIC) 40 mg tablet  (Patient not taking: Reported on 4/4/2024)      furosemide (LASIX) 20 mg tablet Take 20 mg by mouth in the morning (Patient not taking: Reported on 4/4/2024)      lidocaine (Lidoderm) 5 % Apply 1 patch topically over 12 hours daily Remove & Discard patch within 12 hours or as directed by MD (Patient not taking: Reported on 4/4/2024) 15 patch 0    ondansetron (ZOFRAN-ODT) 4 mg disintegrating tablet Take 1 tablet (4 mg total) by mouth every 6 (six) hours as needed for nausea for up to 3 days (Patient not taking: Reported on 4/4/2024) 9 tablet 0    pantoprazole (PROTONIX) 20 mg tablet Take 1 tablet (20 mg total) by mouth daily (Patient not taking: Reported on 4/4/2024) 20 tablet 0     No current facility-administered medications for this visit.       Blood pressure 131/71, pulse 60, weight 94.8 kg (209 lb), SpO2 94%.    PHYSICAL EXAM: Physical Exam  Constitutional:       Appearance: Normal appearance. She is well-developed.   HENT:      Head: Normocephalic and atraumatic.      Nose: Nose normal.   Eyes:      Conjunctiva/sclera: Conjunctivae normal.   Neck:      Thyroid: No thyromegaly.      Vascular: No JVD.      Trachea: No tracheal deviation.   Cardiovascular:      Rate and Rhythm: Normal rate and regular rhythm.      Heart sounds: Normal heart sounds. No  murmur heard.     No friction rub. No gallop.   Pulmonary:      Effort: Pulmonary effort is normal. No respiratory distress.      Breath sounds: Normal breath sounds. No wheezing or rales.   Abdominal:      General: Bowel sounds are normal. There is no distension.      Palpations: Abdomen is soft. There is no mass.      Tenderness: There is no abdominal tenderness. There is no guarding.      Hernia: No hernia is present.   Musculoskeletal:         General: No tenderness or deformity.      Cervical back: Neck supple.      Right lower leg: No edema.      Left lower leg: No edema.   Lymphadenopathy:      Cervical: No cervical adenopathy.   Skin:     General: Skin is warm and dry.      Findings: No erythema or rash.   Neurological:      Mental Status: She is alert and oriented to person, place, and time.   Psychiatric:         Mood and Affect: Mood normal.         Behavior: Behavior normal.         Thought Content: Thought content normal.          Lab Results   Component Value Date    WBC 10.55 (H) 02/20/2024    HGB 10.0 (L) 02/20/2024    HCT 31.2 (L) 02/20/2024    MCV 89 02/20/2024     02/20/2024     Lab Results   Component Value Date    CALCIUM 11.0 (H) 02/20/2024    K 3.6 02/20/2024    CO2 29 02/20/2024     02/20/2024    BUN 16 02/20/2024    CREATININE 0.82 02/20/2024     Lab Results   Component Value Date    ALT 8 02/20/2024    AST 12 (L) 02/20/2024    ALKPHOS 59 02/20/2024     Lab Results   Component Value Date    INR 1.62 (H) 10/26/2023    PROTIME 19.5 (H) 10/26/2023       XR shoulder 2+ views RIGHT    Result Date: 2/20/2024  Impression: Osteoarthritis. No acute bony abnormality in the right shoulder. No change since 2/7/2024. Workstation performed: [id number]

## 2024-04-04 NOTE — ASSESSMENT & PLAN NOTE
Patient reports having more problems with bringing up mucus and some food particles at night.  Possible secondary to esophageal dysmotility.  Rule out any peptic stricture or other lesions including malignancy.    -Had a long discussion with patient and her daughter in detail explaining the different possible etiologies    -Schedule barium swallow    -Patient reports having testing at the [facility].  Will get the reports and decide on upper endoscopy depending on the findings and also depending on the barium swallow results.  Patient understands increased risks because of her age and also being on the blood thinners.    -Advised to chew well before swallowing and eat at her own pace    -Patient was explained about the lifestyle and dietary modifications.  Advised to avoid fatty foods, chocolates, caffeine, alcohol and any other triggering foods.  Avoid eating for at least 3 hours before going to bed.

## 2024-04-04 NOTE — PROGRESS NOTES
Daily Note     Today's date: 2024  Patient name: Kristen Maguire  : 1935  MRN: [id number]  Referring provider: Mary Fajardo MD  Dx:   Encounter Diagnosis     ICD-10-CM    1. Left shoulder pain, unspecified chronicity  M25.512       2. Right shoulder pain, unspecified chronicity  M25.511       3. Generalized weakness  R53.1       4. Shoulder stiffness, right  M25.611                      Subjective: Pt states that she felt good after last session. She slept really well last night. Pt has MD appointment today for colon check per her report.       Objective: See treatment diary below      Assessment: Pt continues to demonstrate fatigue w/ ambulation around clinic and STS indicating impaired activity tolerance, however is improving in this regard from session to session. Provided active rest w/ seated exercises t/o session. Instructed to pt to perform STS and to ambulate around her home more frequently when home aide is present for goal of improved activity tolerance and muscular endurance. Tolerated treatment well. Patient would benefit from continued PT.       Plan: Continue per plan of care.  Progress treatment as tolerated.       Precautions:   *PACEMAKER*  *FALL RISK*    Referral: Help w/ frozen shoulder and help strengthen upper and lower body  Past Medical History:   Diagnosis Date    Arthritis     Cardiac disease     Gout     Lyme disease      Past Surgical History:   Procedure Laterality Date    CARDIAC PACEMAKER PLACEMENT      CHOLECYSTECTOMY      HYSTERECTOMY      JOINT REPLACEMENT       SOC: 2024  FOTO: 2024  POC Expiration: 2024   Daily Treatment Log  Date: 3/20/2024 3/28/2024 4/3/2024 2024    Visit#/ auth: 11 [id number] 13 14    Objective Measures        Vitals Pre-tx   120/74 mmHg  HR: 65 bpm  SpO2 92%    Mid tx:  HR 65 bpm  SpO2: 93%    Post -tx BP:  To 120/64 mmHg  SpO2: 93%  HR: 63 bpm Mid tx:  HR 60 bpm  SpO2: 95%  /62 mmHg    Post -tx BP:  To 130/60 mmHg  SpO2:  "94%  HR: 71 bpm   Mid tx:  HR 76 bpm  SpO2: 93%  BP  120/64 mmHg      Mid tx:  HR 70bpm  SpO2 92%      End of session BP:  To 120/62 mmHg  SpO2: 92%  HR: 71 bpm Pre-tx   128/70 mmHg  HR: 60 bpm  SpO2 93%    Mid tx:  HR 84 bpm  SpO2: 91%    Post -tx BP:  To 120/64 mmHg  SpO2: 92%  HR: 60 bpm    mCTSIB        Manuals 10' 10'  3'     Shoulder flex AROM w/ man assist 1 x 10 R/L  2 x 10 R/L   1 x 10 R/L      Shoulder abd AROM w/ man assist 1 x 10 R/L  2 x 10 R/L   1 x 10 R/L                       Neuro Re-Ed    2'    FT EO/EC (CGA)        Tandem        Backward walking +CS/CGA        Tandem walking +CS/CGA         High march length of rail          Bicep curl stand for balance + UE strength    1 x 10 R/L w/ CS, RW in front              Ther Ex 30' 20' 30' 30'    Cervical extension AROM  1 x 10 2 x 10 1 x 10 1 x 10    Thoracic extension/ pec stretch sitting   40\" x 1      LAQ 2 x 15 R/L alt 2 x 15 R/L alt 1 x 15 R/L alt    1 x 15 R/L alt unsupported sit 1 x 15 R/L alt    HR 2 x 20 seated 2 x 20 seated 1 x 20 seated    1 x 20 R/L alt unsupported sit 2 x 20 seated    TR 1 x 20 seated 2 x 20 seated 1 x 20 seated    1 x 20 R/L alt unsupported sit 1 x 20 seated     Seated marching 1 x 10 R/L alt      1x10 R/L alt unsupported sit    1 x 10 R/L std 2 x 15 R/L alt    2 x 10 R/L std w/ CS 1 x 15 R/L alt    1 x 15 R/L alt unsupported sit     Std knee flex 1 x 10 R/L w/ RW and CGA   1 x 10 R/L w/ RW and CGA    Scapular retraction w/ low row  2 x 10 YTB  1 x 15 YTB supported sit 1 x 15 YTB supported sit    Clamshell hook-lying HOB elevated Unsupported sit 2 x 15 B/L YTB  Supported sit 2 x 15 RTB Supported sit 2 x 20 RTB     Bridges         Std hip ext/ abd        Pulleys        Shoulder Cane ROM Flex unsupported sit 2 x 10  B/L flex 3 x 10  B/L flex 2 x 10      Scapular retraction Seated W vs YTB 2 x 10 w/ man assist on RUE Seated W vs YTB 2 x 15  Seated W vs YTB 2 x 15 w/ man assist for RUE Seated W vs YTB 2 x 15     Objective " measures: AROM, MMT, 5x STS, TUG        EDUCATION: Pathology, review of impairements, prognosis, activity modification, POC, and HEP        Ther Activity 5' 10' 5' 10'     STS 1 x 5 w/ CS/ CGA, BUE    1 x 3 w/ CS/ CGA, BUE 1 x 5 STS w/ CS  2 x 3 w/ CS    Ambulation in clinic  50 ft x 3 w/ RW and  ft x 1 w/ RW and CS; intermittent standing rest 50 ft x 1 w/ RW and CS    Patient education on safety at home        Gait Training                          Modalities                          HEP:   Access Code: [email or web address]  URL: [email or web address]  Date: 03/14/2024  Prepared by: Radha Castro    Exercises  - Seated Long Arc Quad  - 2 x daily - 1 sets - 10 reps  - Seated March  - 2 x daily - 1 sets - 10 reps  - Sit to Stand with Counter Support  - 2 x daily - 1 sets - 3 reps  - Standing March with Counter Support  - 2 x daily - 1 sets - 10 reps  - Seated Heel Raise  - 2 x daily - 1 sets - 10 reps  - Seated Bilateral Shoulder External Rotation with Resistance  - 1 x daily - 1 sets - 10 reps

## 2024-04-08 ENCOUNTER — TELEPHONE (OUTPATIENT)
Dept: GASTROENTEROLOGY | Facility: CLINIC | Age: 89
End: 2024-04-08

## 2024-04-08 ENCOUNTER — OFFICE VISIT (OUTPATIENT)
Dept: PHYSICAL THERAPY | Facility: CLINIC | Age: 89
End: 2024-04-08
Payer: MEDICARE

## 2024-04-08 DIAGNOSIS — M25.512 LEFT SHOULDER PAIN, UNSPECIFIED CHRONICITY: Primary | ICD-10-CM

## 2024-04-08 DIAGNOSIS — R53.1 GENERALIZED WEAKNESS: ICD-10-CM

## 2024-04-08 DIAGNOSIS — M25.611 SHOULDER STIFFNESS, RIGHT: ICD-10-CM

## 2024-04-08 DIAGNOSIS — M25.511 RIGHT SHOULDER PAIN, UNSPECIFIED CHRONICITY: ICD-10-CM

## 2024-04-08 PROCEDURE — 97110 THERAPEUTIC EXERCISES: CPT

## 2024-04-08 NOTE — PROGRESS NOTES
Daily Note     Today's date: 2024  Patient name: Kristen Maguire  : 1935  MRN: [id number]  Referring provider: Mary Fajardo MD  Dx:   Encounter Diagnosis     ICD-10-CM    1. Left shoulder pain, unspecified chronicity  M25.512       2. Right shoulder pain, unspecified chronicity  M25.511       3. Generalized weakness  R53.1       4. Shoulder stiffness, right  M25.611                      Subjective: Pt is a poor historian in regard to PMHx. Pt has a lot of pain in R shoulder today. States that her legs are okay. She denies updates since last PT session. Pt has been taking pain medication. She states that she has to call rheumatologist; states that she had a shot at some point and needs to follow-up with him. She is unsure of when her appointment w/ him was. Pt states that the swelling in her R arm is familiar; it started on her left arm but now it is her right. Pt denies fall since last session.       Objective: See treatment diary below    RUE: Min to moderate swelling noted along RUE into forearm and dorsum of hand. No ecchymosis or discoloration noted.     Assessment: Pt demonstrating fatigue on arrival to PT today. Pt required Jason to ModA to stand from chair t/o session. Limited tolerance to standing today. Session limited due to R arm irritability. Pt unable to tolerate passive R shoulder ROM. Discussed swelling noted on RUE; pt agreeable to discussing RUE swelling w/ caregiver. Instructed pt and caregiver to monitor RUE swelling and present to MD if swelling/ pain persists. Tolerated treatment well. Patient would benefit from continued PT.       Plan: Continue per plan of care.  Progress treatment as tolerated.       Precautions:   *PACEMAKER*  *FALL RISK*    Referral: Help w/ frozen shoulder and help strengthen upper and lower body  Past Medical History:   Diagnosis Date    Arthritis     Cardiac disease     Gout     Lyme disease      Past Surgical History:   Procedure Laterality Date     "CARDIAC PACEMAKER PLACEMENT      CHOLECYSTECTOMY      HYSTERECTOMY      JOINT REPLACEMENT       SOC: 1/23/2024  FOTO: 1/23/2024  POC Expiration: 4/16/2024   Daily Treatment Log  Date: Next session  3/28/2024 4/3/2024 4/4/2024 4/8/2024   Visit#/ auth:  12 13 14 15   Objective Measures        Vitals  Mid tx:  HR 60 bpm  SpO2: 95%  /62 mmHg    Post -tx BP:  To 130/60 mmHg  SpO2: 94%  HR: 71 bpm   Mid tx:  HR 76 bpm  SpO2: 93%  BP  120/64 mmHg      Mid tx:  HR 70bpm  SpO2 92%      End of session BP:  To 120/62 mmHg  SpO2: 92%  HR: 71 bpm Pre-tx   128/70 mmHg  HR: 60 bpm  SpO2 93%    Mid tx:  HR 84 bpm  SpO2: 91%    Post -tx BP:  To 120/64 mmHg  SpO2: 92%  HR: 60 bpm Pre-tx   140/64 mmHg  HR: 71 bpm  SpO2 91%      Post -tx BP:  To 124/60 mmHg  SpO2: 91%  HR: 70 bpm   mCTSIB        Manuals  10'  3' 5'    Shoulder flex AROM w/ man assist  2 x 10 R/L   1 x 10 R/L  1 x 10 L  R unable d/t pain    Shoulder abd AROM w/ man assist  2 x 10 R/L   1 x 10 R/L                       Neuro Re-Ed    2'    FT EO/EC (CGA)        Tandem        Backward walking +CS/CGA        Tandem walking +CS/CGA         High march length of rail          Bicep curl stand for balance + UE strength    1 x 10 R/L w/ CS, RW in front              Ther Ex  20' 30' 30' 35'   Cervical extension AROM   2 x 10 1 x 10 1 x 10 1 x 10   Thoracic extension/ pec stretch sitting   40\" x 1      LAQ  2 x 15 R/L alt 1 x 15 R/L alt    1 x 15 R/L alt unsupported sit 1 x 15 R/L alt 1 x 10 R/L alt   HR  2 x 20 seated 1 x 20 seated    1 x 20 R/L alt unsupported sit 2 x 20 seated 2 x 20 seated   TR  2 x 20 seated 1 x 20 seated    1 x 20 R/L alt unsupported sit 1 x 20 seated 1 x 20 seated    Seated marching  2 x 15 R/L alt    2 x 10 R/L std w/ CS 1 x 15 R/L alt    1 x 15 R/L alt unsupported sit  1 x 15 R/L alt    1 x 5 R/L w/ RW and CGA   Std knee flex    1 x 10 R/L w/ RW and CGA     squeeze     1 x 10 R/L    Scapular retraction w/ low row    1 x 15 YTB supported sit 1 x " 15 YTB supported sit    Clamshell hook-lying HOB elevated   Supported sit 2 x 15 RTB Supported sit 2 x 20 RTB Supported sit 2 x 20 RTB    Bridges         Std hip ext/ abd        Pulleys        Shoulder Cane ROM  B/L flex 3 x 10  B/L flex 2 x 10      Scapular retraction  Seated W vs YTB 2 x 15  Seated W vs YTB 2 x 15 w/ man assist for RUE Seated W vs YTB 2 x 15     Objective measures: AROM, MMT, 5x STS, TUG        EDUCATION: Pathology, review of impairements, prognosis, activity modification, POC, and HEP        Ther Activity  10' 5' 10'     STS  1 x 5 STS w/ CS  2 x 3 w/ CS    Ambulation in clinic  50 ft x 3 w/ RW and  ft x 1 w/ RW and CS; intermittent standing rest 50 ft x 1 w/ RW and CS    Patient education on safety at home        Gait Training                          Modalities                          HEP:   Access Code: [email or web address]  URL: [email or web address]  Date: 03/14/2024  Prepared by: Radha Castro    Exercises  - Seated Long Arc Quad  - 2 x daily - 1 sets - 10 reps  - Seated March  - 2 x daily - 1 sets - 10 reps  - Sit to Stand with Counter Support  - 2 x daily - 1 sets - 3 reps  - Standing March with Counter Support  - 2 x daily - 1 sets - 10 reps  - Seated Heel Raise  - 2 x daily - 1 sets - 10 reps  - Seated Bilateral Shoulder External Rotation with Resistance  - 1 x daily - 1 sets - 10 reps

## 2024-04-08 NOTE — TELEPHONE ENCOUNTER
Results were received and scanned under media   Last OV   Last EGD w/ Biopsy Report  Last CT Scan

## 2024-04-11 ENCOUNTER — EVALUATION (OUTPATIENT)
Dept: PHYSICAL THERAPY | Facility: CLINIC | Age: 89
End: 2024-04-11
Payer: MEDICARE

## 2024-04-11 DIAGNOSIS — M25.611 SHOULDER STIFFNESS, RIGHT: ICD-10-CM

## 2024-04-11 DIAGNOSIS — M25.512 LEFT SHOULDER PAIN, UNSPECIFIED CHRONICITY: Primary | ICD-10-CM

## 2024-04-11 DIAGNOSIS — R53.1 GENERALIZED WEAKNESS: ICD-10-CM

## 2024-04-11 DIAGNOSIS — M25.511 RIGHT SHOULDER PAIN, UNSPECIFIED CHRONICITY: ICD-10-CM

## 2024-04-11 PROCEDURE — 97110 THERAPEUTIC EXERCISES: CPT

## 2024-04-11 NOTE — PROGRESS NOTES
PT Re-Evaluation     Today's date: 2024  Patient name: Kristen Maguire  : 1935  MRN: [id number]  Referring provider: Mary Fajardo MD  Dx:   Encounter Diagnosis     ICD-10-CM    1. Left shoulder pain, unspecified chronicity  M25.512       2. Right shoulder pain, unspecified chronicity  M25.511       3. Generalized weakness  R53.1       4. Shoulder stiffness, right  M25.611                      Assessment  Assessment details: 2024: Kristen Maguire is a 88 y.o. female who presents with bilateral shoulder pain and weakness of bilateral LE. Pt has a significant medical history and multiple hospitalizations as of recent. Pt is currently experiencing flank pain addressed at most recent ED visit  and urinary incontinence. Pt and care-giver instructed to contact PCP regarding bowel and bladder dysfunction. On evaluation, pt demonstrates impaired shoulder AROM, PROM, and strength and impaired BLE weakness, transfers, gait, and balance. Due to these impairments, patient has difficulty reaching overhead, with STS, bed mobility and ambulation affecting her ability to ambulate in her home/outside and perform ADLs without pain and fear of falling. Patient's clinical presentation is consistent with their referring diagnosis of Left shoulder pain, unspecified chronicity  (primary encounter diagnosis), Right shoulder pain, unspecified chronicity, and Generalized weakness. Evaluation limited today due to patient's late arrival, however, pt will benefit from further evaluation next visit. Patient has been educated in pathology, review of impairements, prognosis, activity modification, POC, and HEP. Patient would benefit from skilled physical therapy services to address their aforementioned functional limitations and progress towards prior level of function and independence with home exercise program.     2024: Kristen Maguire is a 88 y.o. female who presents to re-evaluation with bilateral shoulder pain  and weakness of bilateral LE. Pt returns to PT from 2 week period of inactivity d/t other health conditions. On examination today, pt presents with similar shoulder strength and functional limitations.  Pt also demonstrates similar LE strength compared to initial evaluation. Pt demonstrates decline in bilateral shoulder AROM and 5xSTS time which can be attributed to recent period of PT inactivity and ongoing health conditions. Pt does, however, demonstrate improved TUG score indicating progress in regard to LE function; of note, pt did perform this test w/ rollator vs RW as completed on initial evaluation, today. Pt educated on remaining deficits, progress, and POC. Pt with preference to continue PT at this time despite limited progress demonstrated on exam. Pt educated that w/ pt's significant medical hx and complaint of symptoms outside of PT scope of care, pt is to prioritize health care appointments in these areas as she voices transportation being an issue. Pt agreeable to seeing appropriate healthcare professionals in regard to gastrointestinal function and mental health, however plans to continue physical therapy at this time. Patient would benefit from skilled physical therapy services to address their aforementioned functional limitations and progress towards prior level of function and independence with home exercise program.     4/11/2024: Kristen Maguire is a 88 y.o. female who presents to re-evaluation with continued complaint of R shoulder pain and BLE weakness. Pt w/ significant PMHx affecting her progress w/ PT. Pt has consistently been attending PT, yet demonstrates decrease in functional mobility compared to last re-evaluation. She demonstrates minimal improvements in L shoulder AROM and strength. Despite improvements, pt continues to present with impaired BUE AROM, strength, BLE strength, balance, gait and functional mobility affecting her ability to stand and walk. Pt is limited in her ability to  participate in community ambulation safely and utilize RUE for ADLs. Pt educated on remaining deficits, progress, and POC. Despite limited progress noted on re-eval, pt will continue to benefit from skilled PT to prevent decline in function. Educated pt on potential to discharge from skilled PT in upcoming sessions to THRIVE program for strengthening. Pt opposed to this suggestion currently; pt will consider this option and report back to PT next session.Patient would benefit from skilled physical therapy services to address their aforementioned functional limitations and progress towards prior level of function and independence with home exercise program.       Impairments: abnormal gait, abnormal or restricted ROM, abnormal movement, activity intolerance, impaired balance, impaired physical strength, lacks appropriate home exercise program, safety issue, poor posture  and poor body mechanics  Understanding of Dx/Px/POC: good   Prognosis: good    Goals  Short term goals to be achieved by 4 weeks: 2/20/2024  STG1: Patient will be independent with simple HEP to maximize progress between therapy sessions (MET 2/29/2024)  STG2: Patient will improve BLE strength to no less than 4+/5 for improved 5X STS.  (Ongoing 4/11/2024)  STG3: Pt will demo functional shoulder ER reach to C7 bilaterally for improved ability to reach behind head for bathing and to put on seat belt.  (Ongoing 4/11/2024)  STG4: Patient will improve TUG by 3 seconds to demo decreased risk for falls. (MET 2/29/2024)     Long Term Goals to be achieved by 12 weeks: 4/16/2024 updated to 6/4/2024  LTG1: Patient will be independent with updated HEP to demo progress toward discharge to HEP. (Ongoing 4/11/2024)  LTG2: Patient will improve 5X STS to 14.8 sec w/ BUE assist to demo improved BLE strength necessary for safe transfers chair to RW. (Ongoing 4/11/2024)  LTG3: Pt will demo B/L shoulder flex and abd AROM of at least 80 degrees to perform ADLs at chest  height.  (Progressing 4/11/2024)  LTG4: Patient will improve TUG to less than 20 seconds (w/ RW + BUE assist for transfer) to return to independence with basic transfers.(Ongoing 4/11/2024)  LTG4: Pt will report RPE of no greater than 3/10 w/ 5xSTS to demo improved activity tolerance necessary for transfers, dressing, and ambulation in home. (Ongoing 4/11/2024)      Plan  Plan details: HEP development, stretching, strengthening, A/AA/PROM, joint mobilizations, posture education, STM/MI as needed to reduce muscle tension, muscle reeducation, balance training, transfer training, gait training, and safe AD utilization training. PLOC discussed and agreed upon with patient.   Patient would benefit from: PT eval and skilled physical therapy  Planned modality interventions: cryotherapy and thermotherapy: hydrocollator packs  Planned therapy interventions: balance/weight bearing training, coordination, gait training, home exercise program, therapeutic exercise, therapeutic activities, strengthening, patient education, neuromuscular re-education, abdominal trunk stabilization, activity modification, manual therapy, balance, functional ROM exercises and transfer training  Frequency: 2-3x/week.  Plan of Care beginning date: 1/23/2024  Plan of Care expiration date: 6/4/2024  Treatment plan discussed with: patient        Subjective Evaluation    History of Present Illness  Mechanism of injury: I.E. Pt is a 88 y.o. female who presents to PT with complaint of BLE weakness; pt ambulates with RW at baseline. Pt is a poor historian and care-giver (Clari) was not present for evaluation. Care-giver provides little insight at end of session. Kristen is responsible for her own medical management as per care-giver's report including scheduling doctors appointments. Pt states that she had pain in her hand one night. She prolonged calling ambulance; after trying to sleep she realized she needed to call ED. She was told to turn the light on  for the ambulance, while walking in house to prepare for transfer to ER, she passed out and fell onto B/L knees. At hospital she came to find out that she had gout. Pt then was in inpatient rehab for two months where she was ambulating with WC. At end of October, pt had been home about three days and had to call 911 d/t paleness which was due to COVID-19. Was in rehab for an additional month. Pt was hospitalized 1/13 due to report of flank pain as per medical chart, however patient reports most recent hospitalization being due to COVID-19. Pt states that rib pain began a few weeks ago and does not recall it being addressed in hospital until reminded by PT as read in chart review. Pt recalls having UTI prior to hospital admission. Pt states that she has seen PCP twice since release from rehab. Pt has bilateral shoulder pain in front and back. This began over the summer; she was treated in physical therapy for this, however as of recently her shoulder pain has returned (R pain > L). Pt states that she has weak hands. Pt has pain in bilateral knees; she has hx of bilateral knee replacement. She has left foot pain which she thinks is d/t gout. She feels unsteady while walking. Pt does not drive; she has a . Pt states that she does not have a caregiver 24/7. She has a ramp to enter home. She has family in [facility]. Pt has difficulty reaching arms overhead, standing from a chair, walking getting in and out of bed, and using her hands as they are stiff in the morning.     CT abdomen 1/13 Impression:   1. Moderate sized fat-containing hernia inferior to the umbilicus with scattered infiltration of fat and mild fluid. Incarcerated hernia is not excluded.  Correlate with site of pain.  2. Moderate distention of the partially visualized distal esophagus which is fluid-filled. Question related to reflux or esophageal dysfunction.  3. 4.3 cm hypodense area in the left lobe of the liver posteriorly indeterminate.  Numerous cystic areas in the pancreas. Numerous renal cystic lesions bilaterally some of which appear complex warranting further evaluation. Next best imaging test for this   patient would likely be CT with and without IV contrast renal protocol on a nonemergent basis.    Chest x-ray 1/13:   Suspected developing peripheral infiltrate left midlung      2/29/2024: Pt missed last PT appointment due to being ill. Pt recently had visit to ER for feelings of lightheaded and shoulder pain; 2/20/2024. Pt reports administration of injection into the R shoulder which has offered pain relief. States that the L shoulder pain is more prominent now. Pt states that she rarely attempts to use her R shoulder. Pt states that she does feel physical therapy is helping her. She states that if she didn't do the exercises she was prescribed for home she feels she wouldn't be able to walk. She has called some of the numbers provided by PT at last tx session. She is trying to coordinate rides w/ transportation service. She reports throwing up at night and not being able to wear a sleep apnea machine d/t this problem. States that she has multiple doctors appointment that she needs to attend but they are all far away; she is working to coordinate rates. She states that she did talk to her psychologist regarding thoughts expressed at last PT visit and mentions the potential for group therapy. States that this is actively being addressed in meetings w/ psychologist 2x/week.     4/11/2024: Pt states that doing the exercises before standing up helps her to keep her balance. States that when she stands without doing her exercises she does not feel as steady. Pt does not feel ready to d/c from skilled PT as she feels it is helping her functionally.  Caregiver informs PT that pt has a UTI and she is disoriented.   Patient Goals  Patient goals for therapy: increased strength, decreased pain, improved balance, increased motion and independence with  ADLs/IADLs  Patient goal: to become more limber and walk further without feeling exhausted.  Pain  Current pain ratin  At best pain ratin  At worst pain ratin  Location: R shoulder  Quality: sharp and dull ache    Social Support  Steps to enter house: yes (Has ramp access)  5    Treatments  Previous treatment: physical therapy  Current treatment: physical therapy        Objective    RESTING VITALS:  2024:  /70 mmHg  HR 60 BPM *Pacemaker*  2024   Pre-tx   /58 mmHg  HR 60 bpm  SpO2% = 92%    End of tx  / 60 mmHg  HR 62 bpm  SpO2% = 93%  2024:    See tx grid       AROM: sitting   R   L  R  L  R  L      2024  Shoulder flexion  40*   80  20*  75  Unable * 85  Shoulder abduction  52*   70  35  55*  30  60  Functional sh. ER  Occiput*  C7  Ear  C4  Ear  C7  Functional sh. IR  PSIS   PSIS  NT  NT  NT  NT    PROM: sitting   R   L  R  L  B/L      2024  Shoulder flexion  75*   70*  NT  NT  NT   Shoulder abduction  70 firm EF  80  NT  NT  NT    STRENGTH:    R   L  R  L  R  L      2024  Shoulder flexion  2+/5   2+/5  2+/5  2+/5  2+/5*  2+/5    Shoulder abduction  2+/5   2+/5  2+/5  2+/5  2+/5*  2+/5   Shoulder ER   3+/5*   3+/5*  3+/5  3+/5  NT  NT  Shoulder IR   3+/5*   3+/5*  3+/5  3+/5  NT  NT    Posture:   2024 Pt's sitting posture is kyphotic. Shoulders are anteriorly rounded. Decreased lumbar lordosis.   2024: Unchanged  2024: Unchanged    Cervical Screen: cervical AROM limitations    2024  Flexion  Nil   Nil   Nil  Extension Max  Max  Max  R rotation Max*  Max*  Max  L rotation Max*  Max*  Mod*  R sidebend Mod  Mod  mod  L sidebend Mod   Mod   Mod   Retraction Max  Max  Max     Mechanical assessment:  2024 NT  2024: NT  2024: NT    Special Tests:   2024: NT    2/29/2024: NT  4/11/2024: NT    LE Strength: MMT      R  L  R  L  R  L      1/23/2024 1/23/2024 2/29/2024 2/29/2024 4/11/2024 4/11/2024  Hip flexion:    3+/5  3+/5  3+/5  3+/5  3+/5  4/5  Hip extension:   NT  NT  NT  NT  NT  NT  Hip abduction (sit):   4/5  4/5  4/5  4/5  NT  NT  Knee extension:  4/5  4/5  4/5  4/5  4+/5  4+/5  Knee flexion:    4+/5  4+/5  4/5  4/5  4+/5  4+/5  Ankle dorsiflexion:  4/5  4/5  4/5  4/5  4/5  4/5  Ankle plantarflexion:   4/5  4/5  4/5  4/5  4/5  4/5    Flexibility:  1/23/2024  Hamstrings: Deferred d/t time restraint  Hip flexors: Deferred d/t time restraint  2/29/2024:   NT  4/11/2024:    NT    Function:  1/23/2024:   Gait: decreased stride length, impaired speed, increased WB into RW, increased hip flexion bilaterally, unable to stand erect  Stairs: Deferred d/t time restraint  ADLs: difficulty reaching overhead, STS, bed mobility, ambulation  STS: pt with urinary incontinence on first STS, states that she is recovering from urinary tract infection. Is using diapers; stool has been firm. Pt with recent admission to ED 1/13 where she received CT scan of abdomen.   2/29/2024  Gait: no change  Stairs: NT  ADLs: difficulty reaching overhead, STS, bed mobility, ambulation  STS: BUE assist  4/11/2024  Gait: slowed, antaglic, L knee genu valgum on toe off  Stairs: NT  ADLs: unchanged; difficulty reaching overhead, STS, bed mobility, ambulation  STS: BUE assist required      Cut off scores:  All data taken from [facility] Neuro Section or Rehab Measures    Ashraf: <46/56=falls risk  MDC: 6 pts  Age Norms:  60-69: M - 55   F - 55  70-79: M - 54   F - 53  80-89: M - 53   F - 50 5xSTS: Case et al 2010  MDC: 2.3 sec  Age Norms:  60-69: 11.1 sec  70-79: 12.6 sec  80-89: 14.8 sec   TUG  MDC: 4.14 sec  Cut off score:  >13.5 sec community dwelling adults  >32.2 sec frail elderly  <20 sec: I for basic transfers  >30: dependent for transfers 10 Meter Walk Test Norms: Isa et al 2011  20-29: M -  1.35 m/s   F - 1.34 m/s  30-39: M - 1.43 m/s   F - 1.34 m/s  40-49: M - 1.43 m/s   F - 1.39 m/s  50-59: M - 1.43 m/s   F - 1.31 m/s  60-69: M - 1.34 m/s   F - 1.24 m/s  70-79: M - 1.26 m/s   F - 1.13 m/s  80-89: M - 0.97 m/s   F - 0.94 m/s   FGA  MCID: 4 pts  Geriatrics/community < 22/30 fall risk  Geriatrics/community < 20/30 unexplained falls DGI  MDC: vestibular - 4 pts  MDC: geriatric/community - 3 pts  Falls risk <19/24   6 Minute Walk Test  Age Norms  60-69: M - 1876 ft (571.80 m)  F - 1765 ft (537.98 m)  70-79: M - 1729 ft (527.00 m)  F - 1545 ft (470.92 m)  80-89: M - 1368 ft (416.97 m)  F - 1286 ft (391.97 m) mCTSIB  Norm: 20-60 yrs  Eyes open firm: norm sway 0.21-0.48  Eyes closed firm: norm sway 0.48-0.99  Eyes open foam: norm sway 0.38-0.71  Eyes closed foam: norm sway 0.70-2.22       Outcome Measures Initial Eval  1/23/2024 2/29/2024 4/11/2024      5xSTS 31 sec BUE; RPE 8/10 40.4 sec BUE  71 seconds BUE       TUG 61 sec BUE RW 38 sec BUE w/ rollator 47.3 seconds BUE RW      10 meter (Deferred) ft/sec  (Deferred)  m/s NT       GRAHAM (Deferred) /56 NT       FGA (Deferred) /30 NT       DGI (Deferred) /24 NT       mCTSIB  - FTEO (firm)  - FTEC (firm)  - FTEO (foam)  - FTEC (foam) 1/25/2024  50 sec mod sway  25 seconds max sway   NT  NT NT 60 sec min to mod sway  21 seconds mod  sway           6MWT (Deferred)  meters  NT       ABC         DHI              Precautions:   *PACEMAKER*  *FALL RISK*    Referral: Help w/ frozen shoulder and help strengthen upper and lower body  Past Medical History:   Diagnosis Date    Arthritis     Cardiac disease     Gout     Lyme disease      Past Surgical History:   Procedure Laterality Date    CARDIAC PACEMAKER PLACEMENT      CHOLECYSTECTOMY      HYSTERECTOMY      JOINT REPLACEMENT         SOC: 1/23/2024  FOTO: 1/23/2024  POC Expiration: 4/16/2024  updated to 6/4/2024  Daily Treatment Log  Date: 4/11/2024  4/3/2024 4/4/2024 4/8/2024   Visit#/ auth: 16  13 14 15   Objective  "Measures        Vitals Pre-tx   118/64 mmHg    Post -tx BP:  To 110/60 mmHg  SpO2: 94%  HR: 60 bpm  Mid tx:  HR 76 bpm  SpO2: 93%  BP  120/64 mmHg      Mid tx:  HR 70bpm  SpO2 92%      End of session BP:  To 120/62 mmHg  SpO2: 92%  HR: 71 bpm Pre-tx   128/70 mmHg  HR: 60 bpm  SpO2 93%    Mid tx:  HR 84 bpm  SpO2: 91%    Post -tx BP:  To 120/64 mmHg  SpO2: 92%  HR: 60 bpm Pre-tx   140/64 mmHg  HR: 71 bpm  SpO2 91%      Post -tx BP:  To 124/60 mmHg  SpO2: 91%  HR: 70 bpm   mCTSIB        Manuals    3' 5'    Shoulder flex AROM w/ man assist    1 x 10 R/L  1 x 10 L  R unable d/t pain    Shoulder abd AROM w/ man assist    1 x 10 R/L                       Neuro Re-Ed    2'    FT EO/EC (CGA)        Tandem        Backward walking +CS/CGA        Tandem walking +CS/CGA         High march length of rail          Bicep curl stand for balance + UE strength Sitting 2 x 20 B/L   1 x 10 R/L w/ CS, RW in front              Ther Ex 45'  30' 30' 35'   Cervical extension AROM    1 x 10 1 x 10 1 x 10   Thoracic extension/ pec stretch sitting   40\" x 1      LAQ   1 x 15 R/L alt    1 x 15 R/L alt unsupported sit 1 x 15 R/L alt 1 x 10 R/L alt   HR 2 x 20 seated  1 x 20 seated    1 x 20 R/L alt unsupported sit 2 x 20 seated 2 x 20 seated   TR   1 x 20 seated    1 x 20 R/L alt unsupported sit 1 x 20 seated 1 x 20 seated    Seated marching 1 x 10 R/L   1 x 15 R/L alt    1 x 15 R/L alt unsupported sit  1 x 15 R/L alt    1 x 5 R/L w/ RW and CGA   Std knee flex    1 x 10 R/L w/ RW and CGA     squeeze     1 x 10 R/L    Scapular retraction w/ low row    1 x 15 YTB supported sit 1 x 15 YTB supported sit    Clamshell hook-lying HOB elevated   Supported sit 2 x 15 RTB Supported sit 2 x 20 RTB Supported sit 2 x 20 RTB    Bridges         Std hip ext/ abd        Pulleys        Shoulder Cane ROM   B/L flex 2 x 10      Scapular retraction   Seated W vs YTB 2 x 15 w/ man assist for RUE Seated W vs YTB 2 x 15     Objective measures: AROM, MMT, 5x STS, " TUG KE       EDUCATION: Pathology, review of impairements, prognosis, activity modification, POC, and HEP KE; informed of THRIVE program       Ther Activity   5' 10'     STS    2 x 3 w/ CS    Ambulation in clinic   100 ft x 1 w/ RW and CS; intermittent standing rest 50 ft x 1 w/ RW and CS    Patient education on safety at home        Gait Training                          Modalities                          HEP:   Access Code: [email or web address]  URL: [email or web address]  Date: 03/14/2024  Prepared by: Radha Castro    Exercises  - Seated Long Arc Quad  - 2 x daily - 1 sets - 10 reps  - Seated March  - 2 x daily - 1 sets - 10 reps  - Sit to Stand with Counter Support  - 2 x daily - 1 sets - 3 reps  - Standing March with Counter Support  - 2 x daily - 1 sets - 10 reps  - Seated Heel Raise  - 2 x daily - 1 sets - 10 reps  - Seated Bilateral Shoulder External Rotation with Resistance  - 1 x daily - 1 sets - 10 reps

## 2024-04-11 NOTE — LETTER
2024    Mary Fajardo MD  [address] [facility] [address]    Patient: Kristen Maguire   YOB: 1935   Date of Visit: 2024     Encounter Diagnosis     ICD-10-CM    1. Left shoulder pain, unspecified chronicity  M25.512       2. Right shoulder pain, unspecified chronicity  M25.511       3. Generalized weakness  R53.1       4. Shoulder stiffness, right  M25.611           Dear Dr. Fajardo:    Thank you for your recent referral of Kristen Maguire. Please review the attached evaluation summary from Kristen's recent visit.     Please verify that you agree with the plan of care by signing the attached order.     If you have any questions or concerns, please do not hesitate to call.     I sincerely appreciate the opportunity to share in the care of one of your patients and hope to have another opportunity to work with you in the near future.       Sincerely,    Radha Castro, PT      Referring Provider:      I certify that I have read the below Plan of Care and certify the need for these services furnished under this plan of treatment while under my care.                    Mary Fajardo MD  [address] [facility] [address]  Via Fax: [phone]          PT Re-Evaluation     Today's date: 2024  Patient name: Kristen Maguire  : 1935  MRN: [id number]  Referring provider: Mary Fajardo MD  Dx:   Encounter Diagnosis     ICD-10-CM    1. Left shoulder pain, unspecified chronicity  M25.512       2. Right shoulder pain, unspecified chronicity  M25.511       3. Generalized weakness  R53.1       4. Shoulder stiffness, right  M25.611                      Assessment  Assessment details: 2024: Kristen Maguire is a 88 y.o. female who presents with bilateral shoulder pain and weakness of bilateral LE. Pt has a significant medical history and multiple hospitalizations as of recent. Pt is currently experiencing flank pain addressed at most recent ED visit  and urinary  incontinence. Pt and care-giver instructed to contact PCP regarding bowel and bladder dysfunction. On evaluation, pt demonstrates impaired shoulder AROM, PROM, and strength and impaired BLE weakness, transfers, gait, and balance. Due to these impairments, patient has difficulty reaching overhead, with STS, bed mobility and ambulation affecting her ability to ambulate in her home/outside and perform ADLs without pain and fear of falling. Patient's clinical presentation is consistent with their referring diagnosis of Left shoulder pain, unspecified chronicity  (primary encounter diagnosis), Right shoulder pain, unspecified chronicity, and Generalized weakness. Evaluation limited today due to patient's late arrival, however, pt will benefit from further evaluation next visit. Patient has been educated in pathology, review of impairements, prognosis, activity modification, POC, and HEP. Patient would benefit from skilled physical therapy services to address their aforementioned functional limitations and progress towards prior level of function and independence with home exercise program.     2/29/2024: Kristen Maguire is a 88 y.o. female who presents to re-evaluation with bilateral shoulder pain and weakness of bilateral LE. Pt returns to PT from 2 week period of inactivity d/t other health conditions. On examination today, pt presents with similar shoulder strength and functional limitations.  Pt also demonstrates similar LE strength compared to initial evaluation. Pt demonstrates decline in bilateral shoulder AROM and 5xSTS time which can be attributed to recent period of PT inactivity and ongoing health conditions. Pt does, however, demonstrate improved TUG score indicating progress in regard to LE function; of note, pt did perform this test w/ rollator vs RW as completed on initial evaluation, today. Pt educated on remaining deficits, progress, and POC. Pt with preference to continue PT at this time despite limited  progress demonstrated on exam. Pt educated that w/ pt's significant medical hx and complaint of symptoms outside of PT scope of care, pt is to prioritize health care appointments in these areas as she voices transportation being an issue. Pt agreeable to seeing appropriate healthcare professionals in regard to gastrointestinal function and mental health, however plans to continue physical therapy at this time. Patient would benefit from skilled physical therapy services to address their aforementioned functional limitations and progress towards prior level of function and independence with home exercise program.     4/11/2024: Kristen Maguire is a 88 y.o. female who presents to re-evaluation with continued complaint of R shoulder pain and BLE weakness. Pt w/ significant PMHx affecting her progress w/ PT. Pt has consistently been attending PT, yet demonstrates decrease in functional mobility compared to last re-evaluation. She demonstrates minimal improvements in L shoulder AROM and strength. Despite improvements, pt continues to present with impaired BUE AROM, strength, BLE strength, balance, gait and functional mobility affecting her ability to stand and walk. Pt is limited in her ability to participate in community ambulation safely and utilize RUE for ADLs. Pt educated on remaining deficits, progress, and POC. Despite limited progress noted on re-eval, pt will continue to benefit from skilled PT to prevent decline in function. Educated pt on potential to discharge from skilled PT in upcoming sessions to [facility] program for strengthening. Pt opposed to this suggestion currently; pt will consider this option and report back to PT next session.Patient would benefit from skilled physical therapy services to address their aforementioned functional limitations and progress towards prior level of function and independence with home exercise program.       Impairments: abnormal gait, abnormal or restricted ROM, abnormal  movement, activity intolerance, impaired balance, impaired physical strength, lacks appropriate home exercise program, safety issue, poor posture  and poor body mechanics  Understanding of Dx/Px/POC: good   Prognosis: good    Goals  Short term goals to be achieved by 4 weeks: 2/20/2024  STG1: Patient will be independent with simple HEP to maximize progress between therapy sessions (MET 2/29/2024)  STG2: Patient will improve BLE strength to no less than 4+/5 for improved 5X STS.  (Ongoing 4/11/2024)  STG3: Pt will demo functional shoulder ER reach to C7 bilaterally for improved ability to reach behind head for bathing and to put on seat belt.  (Ongoing 4/11/2024)  STG4: Patient will improve TUG by 3 seconds to demo decreased risk for falls. (MET 2/29/2024)     Long Term Goals to be achieved by 12 weeks: 4/16/2024 updated to 6/4/2024  LTG1: Patient will be independent with updated HEP to demo progress toward discharge to HEP. (Ongoing 4/11/2024)  LTG2: Patient will improve 5X STS to 14.8 sec w/ BUE assist to demo improved BLE strength necessary for safe transfers chair to RW. (Ongoing 4/11/2024)  LTG3: Pt will demo B/L shoulder flex and abd AROM of at least 80 degrees to perform ADLs at chest height.  (Progressing 4/11/2024)  LTG4: Patient will improve TUG to less than 20 seconds (w/ RW + BUE assist for transfer) to return to independence with basic transfers.(Ongoing 4/11/2024)  LTG4: Pt will report RPE of no greater than 3/10 w/ 5xSTS to demo improved activity tolerance necessary for transfers, dressing, and ambulation in home. (Ongoing 4/11/2024)      Plan  Plan details: HEP development, stretching, strengthening, A/AA/PROM, joint mobilizations, posture education, STM/MI as needed to reduce muscle tension, muscle reeducation, balance training, transfer training, gait training, and safe AD utilization training. PLOC discussed and agreed upon with patient.   Patient would benefit from: PT eval and skilled physical  therapy  Planned modality interventions: cryotherapy and thermotherapy: hydrocollator packs  Planned therapy interventions: balance/weight bearing training, coordination, gait training, home exercise program, therapeutic exercise, therapeutic activities, strengthening, patient education, neuromuscular re-education, abdominal trunk stabilization, activity modification, manual therapy, balance, functional ROM exercises and transfer training  Frequency: 2-3x/week.  Plan of Care beginning date: 1/23/2024  Plan of Care expiration date: 6/4/2024  Treatment plan discussed with: patient        Subjective Evaluation    History of Present Illness  Mechanism of injury: I.E. Pt is a 88 y.o. female who presents to PT with complaint of BLE weakness; pt ambulates with RW at baseline. Pt is a poor historian and care-giver (Clari) was not present for evaluation. Care-giver provides little insight at end of session. Kristen is responsible for her own medical management as per care-giver's report including scheduling doctors appointments. Pt states that she had pain in her hand one night. She prolonged calling ambulance; after trying to sleep she realized she needed to call ED. She was told to turn the light on for the ambulance, while walking in house to prepare for transfer to ER, she passed out and fell onto B/L knees. At hospital she came to find out that she had gout. Pt then was in inpatient rehab for two months where she was ambulating with WC. At end of October, pt had been home about three days and had to call 911 d/t paleness which was due to COVID-19. Was in rehab for an additional month. Pt was hospitalized 1/13 due to report of flank pain as per medical chart, however patient reports most recent hospitalization being due to COVID-19. Pt states that rib pain began a few weeks ago and does not recall it being addressed in hospital until reminded by PT as read in chart review. Pt recalls having UTI prior to hospital  admission. Pt states that she has seen PCP twice since release from rehab. Pt has bilateral shoulder pain in front and back. This began over the summer; she was treated in physical therapy for this, however as of recently her shoulder pain has returned (R pain > L). Pt states that she has weak hands. Pt has pain in bilateral knees; she has hx of bilateral knee replacement. She has left foot pain which she thinks is d/t gout. She feels unsteady while walking. Pt does not drive; she has a . Pt states that she does not have a caregiver 24/7. She has a ramp to enter home. She has family in [facility]. Pt has difficulty reaching arms overhead, standing from a chair, walking getting in and out of bed, and using her hands as they are stiff in the morning.     CT abdomen 1/13 Impression:   1. Moderate sized fat-containing hernia inferior to the umbilicus with scattered infiltration of fat and mild fluid. Incarcerated hernia is not excluded.  Correlate with site of pain.  2. Moderate distention of the partially visualized distal esophagus which is fluid-filled. Question related to reflux or esophageal dysfunction.  3. 4.3 cm hypodense area in the left lobe of the liver posteriorly indeterminate. Numerous cystic areas in the pancreas. Numerous renal cystic lesions bilaterally some of which appear complex warranting further evaluation. Next best imaging test for this   patient would likely be CT with and without IV contrast renal protocol on a nonemergent basis.    Chest x-ray 1/13:   Suspected developing peripheral infiltrate left midlung      2/29/2024: Pt missed last PT appointment due to being ill. Pt recently had visit to ER for feelings of lightheaded and shoulder pain; 2/20/2024. Pt reports administration of injection into the R shoulder which has offered pain relief. States that the L shoulder pain is more prominent now. Pt states that she rarely attempts to use her R shoulder. Pt states that she does feel  physical therapy is helping her. She states that if she didn't do the exercises she was prescribed for home she feels she wouldn't be able to walk. She has called some of the numbers provided by PT at last tx session. She is trying to coordinate rides w/ transportation service. She reports throwing up at night and not being able to wear a sleep apnea machine d/t this problem. States that she has multiple doctors appointment that she needs to attend but they are all far away; she is working to coordinate rates. She states that she did talk to her psychologist regarding thoughts expressed at last PT visit and mentions the potential for group therapy. States that this is actively being addressed in meetings w/ psychologist 2x/week.     2024: Pt states that doing the exercises before standing up helps her to keep her balance. States that when she stands without doing her exercises she does not feel as steady. Pt does not feel ready to d/c from skilled PT as she feels it is helping her functionally.  Caregiver informs PT that pt has a UTI and she is disoriented.   Patient Goals  Patient goals for therapy: increased strength, decreased pain, improved balance, increased motion and independence with ADLs/IADLs  Patient goal: to become more limber and walk further without feeling exhausted.  Pain  Current pain ratin  At best pain ratin  At worst pain ratin  Location: R shoulder  Quality: sharp and dull ache    Social Support  Steps to enter house: yes (Has ramp access)  5    Treatments  Previous treatment: physical therapy  Current treatment: physical therapy        Objective    RESTING VITALS:  2024:  /70 mmHg  HR 60 BPM *Pacemaker*  2024   Pre-tx   /58 mmHg  HR 60 bpm  SpO2% = 92%    End of tx  / 60 mmHg  HR 62 bpm  SpO2% = 93%  2024:    See tx grid       AROM: sitting   R   L  R  L  R  L      2024  Shoulder  flexion  40*   80  20*  75  Unable * 85  Shoulder abduction  52*   70  35  55*  30  60  Functional sh. ER  Occiput*  C7  Ear  C4  Ear  C7  Functional sh. IR  PSIS   PSIS  NT  NT  NT  NT    PROM: sitting   R   L  R  L  B/L      1/23/2024 1/23/2024 2/29/2024 2/29/2024 4/11/2024  Shoulder flexion  75*   70*  NT  NT  NT   Shoulder abduction  70 firm EF  80  NT  NT  NT    STRENGTH:    R   L  R  L  R  L      1/23/2024 1/23/2024 2/29/2024 2/29/2024 4/11/2024 4/11/2024  Shoulder flexion  2+/5   2+/5  2+/5  2+/5  2+/5*  2+/5    Shoulder abduction  2+/5   2+/5  2+/5  2+/5  2+/5*  2+/5   Shoulder ER   3+/5*   3+/5*  3+/5  3+/5  NT  NT  Shoulder IR   3+/5*   3+/5*  3+/5  3+/5  NT  NT    Posture:   1/23/2024 Pt's sitting posture is kyphotic. Shoulders are anteriorly rounded. Decreased lumbar lordosis.   2/29/2024: Unchanged  4/11/2024: Unchanged    Cervical Screen: cervical AROM limitations    1/23/2024 2/29/2024 4/11/2024  Flexion  Nil   Nil   Nil  Extension Max  Max  Max  R rotation Max*  Max*  Max  L rotation Max*  Max*  Mod*  R sidebend Mod  Mod  mod  L sidebend Mod   Mod   Mod   Retraction Max  Max  Max     Mechanical assessment:  1/23/2024 NT  2/29/2024: NT  4/11/2024: NT    Special Tests:   1/23/2024: NT   2/29/2024: NT  4/11/2024: NT    LE Strength: MMT      R  L  R  L  R  L      1/23/2024 1/23/2024 2/29/2024 2/29/2024 4/11/2024 4/11/2024  Hip flexion:    3+/5  3+/5  3+/5  3+/5  3+/5  4/5  Hip extension:   NT  NT  NT  NT  NT  NT  Hip abduction (sit):   4/5  4/5  4/5  4/5  NT  NT  Knee extension:  4/5  4/5  4/5  4/5  4+/5  4+/5  Knee flexion:    4+/5  4+/5  4/5  4/5  4+/5  4+/5  Ankle dorsiflexion:  4/5  4/5  4/5  4/5  4/5  4/5  Ankle plantarflexion:   4/5  4/5  4/5  4/5  4/5  4/5    Flexibility:  1/23/2024  Hamstrings: Deferred d/t time restraint  Hip flexors: Deferred d/t time restraint  2/29/2024:   NT  4/11/2024:    NT    Function:  1/23/2024:   Gait: decreased stride length, impaired speed, increased WB into RW,  increased hip flexion bilaterally, unable to stand erect  Stairs: Deferred d/t time restraint  ADLs: difficulty reaching overhead, STS, bed mobility, ambulation  STS: pt with urinary incontinence on first STS, states that she is recovering from urinary tract infection. Is using diapers; stool has been firm. Pt with recent admission to ED 1/13 where she received CT scan of abdomen.   2/29/2024  Gait: no change  Stairs: NT  ADLs: difficulty reaching overhead, STS, bed mobility, ambulation  STS: BUE assist  4/11/2024  Gait: slowed, antaglic, L knee genu valgum on toe off  Stairs: NT  ADLs: unchanged; difficulty reaching overhead, STS, bed mobility, ambulation  STS: BUE assist required      Cut off scores:  All data taken from [facility] Neuro Section or Rehab Measures    Ashraf: <46/56=falls risk  MDC: 6 pts  Age Norms:  60-69: M - 55   F - 55  70-79: M - 54   F - 53  80-89: M - 53   F - 50 5xSTS: Case et al 2010  MDC: 2.3 sec  Age Norms:  60-69: 11.1 sec  70-79: 12.6 sec  80-89: 14.8 sec   TUG  MDC: 4.14 sec  Cut off score:  >13.5 sec community dwelling adults  >32.2 sec frail elderly  <20 sec: I for basic transfers  >30: dependent for transfers 10 Meter Walk Test Norms: Kath and Jose et al 2011  20-29: M - 1.35 m/s   F - 1.34 m/s  30-39: M - 1.43 m/s   F - 1.34 m/s  40-49: M - 1.43 m/s   F - 1.39 m/s  50-59: M - 1.43 m/s   F - 1.31 m/s  60-69: M - 1.34 m/s   F - 1.24 m/s  70-79: M - 1.26 m/s   F - 1.13 m/s  80-89: M - 0.97 m/s   F - 0.94 m/s   FGA  MCID: 4 pts  Geriatrics/community < 22/30 fall risk  Geriatrics/community < 20/30 unexplained falls DGI  MDC: vestibular - 4 pts  MDC: geriatric/community - 3 pts  Falls risk <19/24   6 Minute Walk Test  Age Norms  60-69: M - 1876 ft (571.80 m)  F - 1765 ft (537.98 m)  70-79: M - 1729 ft (527.00 m)  F - 1545 ft (470.92 m)  80-89: M - 1368 ft (416.97 m)  F - 1286 ft (391.97 m) mCTSIB  Norm: 20-60 yrs  Eyes open firm: norm sway 0.21-0.48  Eyes closed firm: norm sway  0.48-0.99  Eyes open foam: norm sway 0.38-0.71  Eyes closed foam: norm sway 0.70-2.22       Outcome Measures Initial Eval  1/23/2024 2/29/2024 4/11/2024      5xSTS 31 sec BUE; RPE 8/10 40.4 sec BUE  71 seconds BUE       TUG 61 sec BUE RW 38 sec BUE w/ rollator 47.3 seconds BUE RW      10 meter (Deferred) ft/sec  (Deferred)  m/s NT       GRAHAM (Deferred) /56 NT       FGA (Deferred) /30 NT       DGI (Deferred) /24 NT       mCTSIB  - FTEO (firm)  - FTEC (firm)  - FTEO (foam)  - FTEC (foam) 1/25/2024  50 sec mod sway  25 seconds max sway   NT  NT NT 60 sec min to mod sway  21 seconds mod  sway           6MWT (Deferred)  meters  NT       ABC         DHI              Precautions:   *PACEMAKER*  *FALL RISK*    Referral: Help w/ frozen shoulder and help strengthen upper and lower body  Past Medical History:   Diagnosis Date   • Arthritis    • Cardiac disease    • Gout    • Lyme disease      Past Surgical History:   Procedure Laterality Date   • CARDIAC PACEMAKER PLACEMENT     • CHOLECYSTECTOMY     • HYSTERECTOMY     • JOINT REPLACEMENT         SOC: 1/23/2024  FOTO: 1/23/2024  POC Expiration: 4/16/2024  updated to 6/4/2024  Daily Treatment Log  Date: 4/11/2024  4/3/2024 4/4/2024 4/8/2024   Visit#/ auth: 16  13 14 15   Objective Measures        Vitals Pre-tx   118/64 mmHg    Post -tx BP:  To 110/60 mmHg  SpO2: 94%  HR: 60 bpm  Mid tx:  HR 76 bpm  SpO2: 93%  BP  120/64 mmHg      Mid tx:  HR 70bpm  SpO2 92%      End of session BP:  To 120/62 mmHg  SpO2: 92%  HR: 71 bpm Pre-tx   128/70 mmHg  HR: 60 bpm  SpO2 93%    Mid tx:  HR 84 bpm  SpO2: 91%    Post -tx BP:  To 120/64 mmHg  SpO2: 92%  HR: 60 bpm Pre-tx   140/64 mmHg  HR: 71 bpm  SpO2 91%      Post -tx BP:  To 124/60 mmHg  SpO2: 91%  HR: 70 bpm   mCTSIB        Manuals    3' 5'    Shoulder flex AROM w/ man assist    1 x 10 R/L  1 x 10 L  R unable d/t pain    Shoulder abd AROM w/ man assist    1 x 10 R/L                       Neuro Re-Ed    2'    FT EO/EC (CGA)        Tandem       "  Backward walking +CS/CGA        Tandem walking +CS/CGA         High march length of rail          Bicep curl stand for balance + UE strength Sitting 2 x 20 B/L   1 x 10 R/L w/ CS, RW in front              Ther Ex 45'  30' 30' 35'   Cervical extension AROM    1 x 10 1 x 10 1 x 10   Thoracic extension/ pec stretch sitting   40\" x 1      LAQ   1 x 15 R/L alt    1 x 15 R/L alt unsupported sit 1 x 15 R/L alt 1 x 10 R/L alt   HR 2 x 20 seated  1 x 20 seated    1 x 20 R/L alt unsupported sit 2 x 20 seated 2 x 20 seated   TR   1 x 20 seated    1 x 20 R/L alt unsupported sit 1 x 20 seated 1 x 20 seated    Seated marching 1 x 10 R/L   1 x 15 R/L alt    1 x 15 R/L alt unsupported sit  1 x 15 R/L alt    1 x 5 R/L w/ RW and CGA   Std knee flex    1 x 10 R/L w/ RW and CGA     squeeze     1 x 10 R/L    Scapular retraction w/ low row    1 x 15 YTB supported sit 1 x 15 YTB supported sit    Clamshell hook-lying HOB elevated   Supported sit 2 x 15 RTB Supported sit 2 x 20 RTB Supported sit 2 x 20 RTB    Bridges         Std hip ext/ abd        Pulleys        Shoulder Cane ROM   B/L flex 2 x 10      Scapular retraction   Seated W vs YTB 2 x 15 w/ man assist for RUE Seated W vs YTB 2 x 15     Objective measures: AROM, MMT, 5x STS, TUG KE       EDUCATION: Pathology, review of impairements, prognosis, activity modification, POC, and HEP KE; informed of THRIVE program       Ther Activity   5' 10'     STS    2 x 3 w/ CS    Ambulation in clinic   100 ft x 1 w/ RW and CS; intermittent standing rest 50 ft x 1 w/ RW and CS    Patient education on safety at home        Gait Training                          Modalities                          HEP:   Access Code: [email or web address]  URL: [email or web address]  Date: 03/14/2024  Prepared by: Radha Castro    Exercises  - Seated Long Arc Quad  - 2 x daily - 1 sets - 10 reps  - Seated March  - 2 x daily - 1 sets - 10 reps  - Sit to Stand with Counter Support  - 2 x daily - 1 sets - 3 " reps  - Standing March with Counter Support  - 2 x daily - 1 sets - 10 reps  - Seated Heel Raise  - 2 x daily - 1 sets - 10 reps  - Seated Bilateral Shoulder External Rotation with Resistance  - 1 x daily - 1 sets - 10 reps

## 2024-04-12 ENCOUNTER — HOSPITAL ENCOUNTER (EMERGENCY)
Facility: HOSPITAL | Age: 89
Discharge: HOME | End: 2024-04-12
Attending: EMERGENCY MEDICINE
Payer: MEDICARE

## 2024-04-12 VITALS
OXYGEN SATURATION: 94 % | SYSTOLIC BLOOD PRESSURE: 122 MMHG | HEIGHT: 64 IN | TEMPERATURE: 97.4 F | BODY MASS INDEX: 35.68 KG/M2 | HEART RATE: 58 BPM | WEIGHT: 209 LBS | DIASTOLIC BLOOD PRESSURE: 58 MMHG | RESPIRATION RATE: 18 BRPM

## 2024-04-12 DIAGNOSIS — N39.0 UTI (URINARY TRACT INFECTION): Primary | ICD-10-CM

## 2024-04-12 LAB
ANION GAP SERPL CALCULATED.3IONS-SCNC: 5 MMOL/L (ref 4–13)
BACTERIA UR QL AUTO: ABNORMAL /HPF
BASOPHILS # BLD AUTO: 0.04 THOUSANDS/ÂΜL (ref 0–0.1)
BASOPHILS NFR BLD AUTO: 0 % (ref 0–1)
BILIRUB UR QL STRIP: ABNORMAL
BUN SERPL-MCNC: 13 MG/DL (ref 5–25)
CALCIUM SERPL-MCNC: 10.9 MG/DL (ref 8.4–10.2)
CHLORIDE SERPL-SCNC: 99 MMOL/L (ref 96–108)
CLARITY UR: ABNORMAL
CO2 SERPL-SCNC: 30 MMOL/L (ref 21–32)
COLOR UR: YELLOW
CREAT SERPL-MCNC: 0.82 MG/DL (ref 0.6–1.3)
EOSINOPHIL # BLD AUTO: 0.14 THOUSAND/ÂΜL (ref 0–0.61)
EOSINOPHIL NFR BLD AUTO: 1 % (ref 0–6)
ERYTHROCYTE [DISTWIDTH] IN BLOOD BY AUTOMATED COUNT: 14.3 % (ref 11.6–15.1)
GFR SERPL CREATININE-BSD FRML MDRD: 64 ML/MIN/1.73SQ M
GLUCOSE SERPL-MCNC: 115 MG/DL (ref 65–140)
GLUCOSE UR STRIP-MCNC: NEGATIVE MG/DL
HCT VFR BLD AUTO: 31.1 % (ref 34.8–46.1)
HGB BLD-MCNC: 9.5 G/DL (ref 11.5–15.4)
HGB UR QL STRIP.AUTO: ABNORMAL
IMM GRANULOCYTES # BLD AUTO: 0.04 THOUSAND/UL (ref 0–0.2)
IMM GRANULOCYTES NFR BLD AUTO: 0 % (ref 0–2)
KETONES UR STRIP-MCNC: NEGATIVE MG/DL
LEUKOCYTE ESTERASE UR QL STRIP: ABNORMAL
LYMPHOCYTES # BLD AUTO: 1.29 THOUSANDS/ÂΜL (ref 0.6–4.47)
LYMPHOCYTES NFR BLD AUTO: 10 % (ref 14–44)
MCH RBC QN AUTO: 26.4 PG (ref 26.8–34.3)
MCHC RBC AUTO-ENTMCNC: 30.5 G/DL (ref 31.4–37.4)
MCV RBC AUTO: 86 FL (ref 82–98)
MONOCYTES # BLD AUTO: 1.29 THOUSAND/ÂΜL (ref 0.17–1.22)
MONOCYTES NFR BLD AUTO: 10 % (ref 4–12)
NEUTROPHILS # BLD AUTO: 10.2 THOUSANDS/ÂΜL (ref 1.85–7.62)
NEUTS SEG NFR BLD AUTO: 79 % (ref 43–75)
NITRITE UR QL STRIP: NEGATIVE
NON-SQ EPI CELLS URNS QL MICRO: ABNORMAL /HPF
NRBC BLD AUTO-RTO: 0 /100 WBCS
PH UR STRIP.AUTO: 7.5 [PH]
PLATELET # BLD AUTO: 496 THOUSANDS/UL (ref 149–390)
PMV BLD AUTO: 9.3 FL (ref 8.9–12.7)
POTASSIUM SERPL-SCNC: 4.1 MMOL/L (ref 3.5–5.3)
PROT UR STRIP-MCNC: ABNORMAL MG/DL
RBC # BLD AUTO: 3.6 MILLION/UL (ref 3.81–5.12)
RBC #/AREA URNS AUTO: ABNORMAL /HPF
SODIUM SERPL-SCNC: 134 MMOL/L (ref 135–147)
SP GR UR STRIP.AUTO: 1.01 (ref 1–1.03)
UROBILINOGEN UR QL STRIP.AUTO: 0.2 E.U./DL
WBC # BLD AUTO: 13 THOUSAND/UL (ref 4.31–10.16)
WBC #/AREA URNS AUTO: ABNORMAL /HPF

## 2024-04-12 PROCEDURE — 87086 URINE CULTURE/COLONY COUNT: CPT | Performed by: PHYSICIAN ASSISTANT

## 2024-04-12 PROCEDURE — 80048 BASIC METABOLIC PNL TOTAL CA: CPT | Performed by: PHYSICIAN ASSISTANT

## 2024-04-12 PROCEDURE — 97162 PT EVAL MOD COMPLEX 30 MIN: CPT

## 2024-04-12 PROCEDURE — 81001 URINALYSIS AUTO W/SCOPE: CPT | Performed by: PHYSICIAN ASSISTANT

## 2024-04-12 PROCEDURE — 87077 CULTURE AEROBIC IDENTIFY: CPT | Performed by: PHYSICIAN ASSISTANT

## 2024-04-12 PROCEDURE — 85025 COMPLETE CBC W/AUTO DIFF WBC: CPT | Performed by: PHYSICIAN ASSISTANT

## 2024-04-12 PROCEDURE — 99283 EMERGENCY DEPT VISIT LOW MDM: CPT

## 2024-04-12 PROCEDURE — 99284 EMERGENCY DEPT VISIT MOD MDM: CPT | Performed by: PHYSICIAN ASSISTANT

## 2024-04-12 PROCEDURE — 36415 COLL VENOUS BLD VENIPUNCTURE: CPT | Performed by: PHYSICIAN ASSISTANT

## 2024-04-12 PROCEDURE — 97167 OT EVAL HIGH COMPLEX 60 MIN: CPT

## 2024-04-12 RX ORDER — NITROFURANTOIN 25; 75 MG/1; MG/1
100 CAPSULE ORAL 2 TIMES DAILY
Qty: 10 CAPSULE | Refills: 0 | Status: SHIPPED | OUTPATIENT
Start: 2024-04-12 | End: 2024-04-17

## 2024-04-12 RX ORDER — NITROFURANTOIN 25; 75 MG/1; MG/1
100 CAPSULE ORAL ONCE
Status: COMPLETED | OUTPATIENT
Start: 2024-04-12 | End: 2024-04-12

## 2024-04-12 RX ADMIN — NITROFURANTOIN (MONOHYDRATE/MACROCRYSTALS) 100 MG: 75; 25 CAPSULE ORAL at 15:19

## 2024-04-12 NOTE — ED PROVIDER NOTES
History  Chief Complaint   Patient presents with    Possible UTI     States her urine smells bad, also talking about hand pain back in August and bilateral shoulder pain     88-year-old female presenting today for evaluation of foul-smelling urine.  States that she is unsure when this started however states she has a long history of UTIs and believes she might have a UTI today.  Caregiver who brought the patient to the ED.  Patient reports that caregiver thought that she was more fatigued than usual however does not offer any other complaints, does not report any injuries or falls.  States that she has chronic joint pain which has not worsened.  Denies chest pain, shortness of breath, cough, congestion, abdominal pain, back pain, confusion, headache.  Differential includes but is not limited to UTI, ELVIS, electrolyte abnormality etc.        Prior to Admission Medications   Prescriptions Last Dose Informant Patient Reported? Taking?   Apixaban (ELIQUIS PO)  Self Yes No   Sig: Take 5 mg by mouth 2 (two) times a day   Diclofenac Sodium (VOLTAREN) 1 %  Self No No   Sig: Apply 2 g topically 4 (four) times a day   acetaminophen (TYLENOL) 325 mg tablet  Outside Facility (Specify), Self Yes No   Sig: Take 650 mg by mouth every 6 (six) hours as needed for mild pain   Patient not taking: Reported on 4/4/2024   albuterol (PROVENTIL HFA,VENTOLIN HFA) 90 mcg/act inhaler  Self Yes No   Patient not taking: Reported on 4/4/2024   allopurinol (ZYLOPRIM) 100 mg tablet  Self Yes No   bisacodyl (bisacodyl) 5 mg EC tablet  Self Yes No   Sig: Take 5 mg by mouth daily as needed for constipation   carvedilol (COREG) 3.125 mg tablet  Self Yes No   carvedilol (COREG) 6.25 mg tablet  Self Yes No   Sig: Take 6.25 mg by mouth in the morning   Patient not taking: Reported on 4/4/2024   clotrimazole (LOTRIMIN) 1 % cream  Self No No   Sig: Apply topically 2 (two) times a day   Patient not taking: Reported on 1/13/2024   colchicine (COLCRYS) 0.6 mg  tablet  Self Yes No   dextromethorphan-guaiFENesin (ROBITUSSIN DM)  mg/5 mL syrup  Self No No   Sig: Take 10 mL by mouth every 4 (four) hours as needed for cough or congestion   Patient not taking: Reported on 1/13/2024   docusate sodium (COLACE) 100 mg capsule  Self Yes No   Sig: Take 100 mg by mouth 2 (two) times a day   estradiol (ESTRACE) 0.1 mg/g vaginal cream  Self Yes No   Sig: Not taking currently but will 4/4/24   famotidine (PEPCID) 40 MG tablet  Self Yes No   Sig: Take 40 mg by mouth daily   febuxostat (ULORIC) 40 mg tablet  Self Yes No   Patient not taking: Reported on 4/4/2024   furosemide (LASIX) 20 mg tablet  Self Yes No   Sig: Take 20 mg by mouth in the morning   Patient not taking: Reported on 4/4/2024   furosemide (LASIX) 40 mg tablet  Self Yes No   lidocaine (Lidoderm) 5 %  Self No No   Sig: Apply 1 patch topically over 12 hours daily Remove & Discard patch within 12 hours or as directed by MD   Patient not taking: Reported on 4/4/2024   montelukast (SINGULAIR) 10 mg tablet  Self Yes No   nitrofurantoin (MACROBID) 100 mg capsule  Self Yes No   ondansetron (ZOFRAN-ODT) 4 mg disintegrating tablet   No No   Sig: Take 1 tablet (4 mg total) by mouth every 6 (six) hours as needed for nausea for up to 3 days   Patient not taking: Reported on 4/4/2024   pantoprazole (PROTONIX) 20 mg tablet  Self No No   Sig: Take 1 tablet (20 mg total) by mouth daily   Patient not taking: Reported on 4/4/2024   risperiDONE (RisperDAL) 0.25 mg tablet  Self Yes No   Sig: Take 0.25 mg by mouth daily   sertraline (ZOLOFT) 25 mg tablet  Self Yes No   Sig: Take 25 mg by mouth daily      Facility-Administered Medications: None       Past Medical History:   Diagnosis Date    Arthritis     Cardiac disease     Gout     Lyme disease        Past Surgical History:   Procedure Laterality Date    CARDIAC PACEMAKER PLACEMENT      CHOLECYSTECTOMY      HYSTERECTOMY      JOINT REPLACEMENT         History reviewed. No pertinent family  history.  I have reviewed and agree with the history as documented.    E-Cigarette/Vaping    E-Cigarette Use Never User      E-Cigarette/Vaping Substances    Nicotine No     THC No     CBD No     Flavoring No     Other No     Unknown No      Social History     Tobacco Use    Smoking status: Never    Smokeless tobacco: Never   Vaping Use    Vaping status: Never Used   Substance Use Topics    Alcohol use: Never    Drug use: No       Review of Systems   Constitutional:  Positive for fatigue. Negative for chills and fever.   HENT: Negative.  Negative for congestion, postnasal drip, rhinorrhea and sore throat.    Eyes: Negative.    Respiratory: Negative.  Negative for cough, shortness of breath and wheezing.    Cardiovascular: Negative.    Gastrointestinal: Negative.  Negative for abdominal pain, diarrhea, nausea and vomiting.   Endocrine: Negative.    Genitourinary: Negative.    Musculoskeletal: Negative.    Skin: Negative.    Neurological: Negative.    Hematological: Negative.    Psychiatric/Behavioral: Negative.     All other systems reviewed and are negative.      Physical Exam  Physical Exam  Vitals and nursing note reviewed.   Constitutional:       General: She is not in acute distress.     Appearance: Normal appearance. She is well-developed. She is obese. She is not diaphoretic.   HENT:      Head: Normocephalic and atraumatic.      Right Ear: External ear normal.      Left Ear: External ear normal.      Nose: Nose normal.      Mouth/Throat:      Pharynx: No oropharyngeal exudate.   Eyes:      General: No scleral icterus.        Right eye: No discharge.         Left eye: No discharge.      Conjunctiva/sclera: Conjunctivae normal.      Pupils: Pupils are equal, round, and reactive to light.   Cardiovascular:      Rate and Rhythm: Normal rate and regular rhythm.      Pulses: Normal pulses.      Heart sounds: Normal heart sounds. No murmur heard.     No friction rub. No gallop.   Pulmonary:      Effort: Pulmonary  effort is normal. No respiratory distress.      Breath sounds: Normal breath sounds. No stridor. No wheezing, rhonchi or rales.   Chest:      Chest wall: No tenderness.   Abdominal:      General: Bowel sounds are normal. There is no distension.      Palpations: Abdomen is soft. There is no mass.      Tenderness: There is no abdominal tenderness. There is no right CVA tenderness, left CVA tenderness, guarding or rebound.      Hernia: No hernia is present.   Musculoskeletal:      Cervical back: Normal range of motion and neck supple.   Lymphadenopathy:      Cervical: No cervical adenopathy.   Skin:     General: Skin is warm and dry.      Capillary Refill: Capillary refill takes less than 2 seconds.      Coloration: Skin is not pale.      Findings: No erythema or rash.   Neurological:      General: No focal deficit present.      Mental Status: She is alert and oriented to person, place, and time. Mental status is at baseline.   Psychiatric:         Thought Content: Thought content normal.         Judgment: Judgment normal.         Vital Signs  ED Triage Vitals [04/12/24 1051]   Temperature Pulse Respirations Blood Pressure SpO2   (!) 97.4 °F (36.3 °C) 58 18 122/58 94 %      Temp src Heart Rate Source Patient Position - Orthostatic VS BP Location FiO2 (%)   -- -- -- -- --      Pain Score       No Pain           Vitals:    04/12/24 1051   BP: 122/58   Pulse: 58         Visual Acuity      ED Medications  Medications   nitrofurantoin (MACROBID) extended-release capsule 100 mg (has no administration in time range)       Diagnostic Studies  Results Reviewed       Procedure Component Value Units Date/Time    Urine Microscopic [[id number]]  (Abnormal) Collected: 04/12/24 1356    Lab Status: Final result Specimen: Urine, Straight Cath Updated: 04/12/24 1413     RBC, UA       Field obscured, unable to enumerate     /hpf     WBC, UA Innumerable /hpf      Epithelial Cells       Field obscured, unable to enumerate     /hpf      Bacteria, UA Innumerable /hpf     Urine culture [[id number]] Collected: 04/12/24 1356    Lab Status: In process Specimen: Urine, Straight Cath Updated: 04/12/24 1412    UA w Reflex to Microscopic w Reflex to Culture [[id number]]  (Abnormal) Collected: 04/12/24 1356    Lab Status: Final result Specimen: Urine, Straight Cath Updated: 04/12/24 1409     Color, UA Yellow     Clarity, UA Cloudy     Specific Gravity, UA 1.010     pH, UA 7.5     Leukocytes, UA Large     Nitrite, UA Negative     Protein, UA 30 (1+) mg/dl      Glucose, UA Negative mg/dl      Ketones, UA Negative mg/dl      Urobilinogen, UA 0.2 E.U./dl      Bilirubin, UA Small     Occult Blood, UA Moderate    Basic metabolic panel [[id number]]  (Abnormal) Collected: 04/12/24 1121    Lab Status: Final result Specimen: Blood from Arm, Right Updated: 04/12/24 1148     Sodium 134 mmol/L      Potassium 4.1 mmol/L      Chloride 99 mmol/L      CO2 30 mmol/L      ANION GAP 5 mmol/L      BUN 13 mg/dL      Creatinine 0.82 mg/dL      Glucose 115 mg/dL      Calcium 10.9 mg/dL      eGFR 64 ml/min/1.73sq m     Narrative:      National Kidney Disease Foundation guidelines for Chronic Kidney Disease (CKD):     Stage 1 with normal or high GFR (GFR > 90 mL/min/1.73 square meters)    Stage 2 Mild CKD (GFR = 60-89 mL/min/1.73 square meters)    Stage 3A Moderate CKD (GFR = 45-59 mL/min/1.73 square meters)    Stage 3B Moderate CKD (GFR = 30-44 mL/min/1.73 square meters)    Stage 4 Severe CKD (GFR = 15-29 mL/min/1.73 square meters)    Stage 5 End Stage CKD (GFR <15 mL/min/1.73 square meters)  Note: GFR calculation is accurate only with a steady state creatinine    CBC and differential [[id number]]  (Abnormal) Collected: 04/12/24 1121    Lab Status: Final result Specimen: Blood from Arm, Right Updated: 04/12/24 1128     WBC 13.00 Thousand/uL      RBC 3.60 Million/uL      Hemoglobin 9.5 g/dL      Hematocrit 31.1 %      MCV 86 fL      MCH 26.4 pg      MCHC 30.5 g/dL      RDW 14.3 %       MPV 9.3 fL      Platelets 496 Thousands/uL      nRBC 0 /100 WBCs      Segmented % 79 %      Immature Grans % 0 %      Lymphocytes % 10 %      Monocytes % 10 %      Eosinophils Relative 1 %      Basophils Relative 0 %      Absolute Neutrophils 10.20 Thousands/µL      Absolute Immature Grans 0.04 Thousand/uL      Absolute Lymphocytes 1.29 Thousands/µL      Absolute Monocytes 1.29 Thousand/µL      Eosinophils Absolute 0.14 Thousand/µL      Basophils Absolute 0.04 Thousands/µL                    No orders to display              Procedures  Procedures         ED Course  ED Course as of 04/12/24 1511   Fri Apr 12, 2024   1227 Need urine    1246 Waiting on urine patient drinking    1325 Patient updated, still waiting for urine   1439 Patient does not think she will be able to go home, states her home health aid dropped her off and works another job. Will obtain PT/OT eval.    1505 PT/OT at bedside                                SBIRT 22yo+      Flowsheet Row Most Recent Value   Initial Alcohol Screen: US AUDIT-C     1. How often do you have a drink containing alcohol? 0 Filed at: 04/12/2024 1055   2. How many drinks containing alcohol do you have on a typical day you are drinking?  0 Filed at: 04/12/2024 1055   3a. Male UNDER 65: How often do you have five or more drinks on one occasion? 0 Filed at: 04/12/2024 1055   3b. FEMALE Any Age, or MALE 65+: How often do you have 4 or more drinks on one occassion? 0 Filed at: 04/12/2024 1055   Audit-C Score 0 Filed at: 04/12/2024 1055   BERNARDINO: How many times in the past year have you...    Used an illegal drug or used a prescription medication for non-medical reasons? Never Filed at: 04/12/2024 1055                      Medical Decision Making  Patient appears well no distress, somewhat tangential with her history describing past ailments however today's concern for potential UTI, patient does exhibit signs of UTI.  She was seen by PT OT and cleared, safe to go home. patient we  will send urine culture and begin treatment for UTI and have patient follow-up with PCP.     Patient is informed to return to the emergency department for worsening of symptoms and was given proper education regarding their diagnosis and symptoms. Otherwise the patient is informed to follow up with their primary care doctor for re-evaluation. The patient verbalizes understanding and agrees with above assessment and plan. All questions were answered.          Amount and/or Complexity of Data Reviewed  Labs: ordered.    Risk  Prescription drug management.             Disposition  Final diagnoses:   UTI (urinary tract infection)     Time reflects when diagnosis was documented in both MDM as applicable and the Disposition within this note       Time User Action Codes Description Comment    4/12/2024  3:09 PM Eleonora Juarez Add [N39.0] UTI (urinary tract infection)           ED Disposition       ED Disposition   Discharge    Condition   Stable    Date/Time   Fri Apr 12, 2024 8190    Comment   Kristen Maguire discharge to home/self care.                   Follow-up Information       Follow up With Specialties Details Why Contact Info Additional Information    [facility] Emergency Department Emergency Medicine Go to  If symptoms worsen, otherwise please follow up with your family doctor [address] [place] [address]  [phone] [facility], [address] [place] [address], [phone]            Patient's Medications   Discharge Prescriptions    NITROFURANTOIN (MACROBID) 100 MG CAPSULE    Take 1 capsule (100 mg total) by mouth 2 (two) times a day for 5 days       Start Date: 4/12/2024 End Date: 4/17/2024       Order Dose: 100 mg       Quantity: 10 capsule    Refills: 0       No discharge procedures on file.    PDMP Review       None            ED Provider  Electronically Signed by             Eleonora Juarez PA-C  04/12/24 9010

## 2024-04-12 NOTE — ED NOTES
"Pt verbally upset and requesting to \"stay three days\". Discharge explained to pt. Pt currently does not have keys to access home if round trip was arrange. Will continue to attempt to secure transportation for pt.      Edwina Yanez RN  04/12/24 6937    "

## 2024-04-12 NOTE — ED NOTES
Clari called back and will be arriving in 15 minutes. Pt made aware.      Edwina Yanez RN  04/12/24 9245

## 2024-04-12 NOTE — ED NOTES
"When this RN entered room pt was offered bed acuña. Pt refused then said \"oh I think I am going right now\". This RN place pt on bed pan but was unsuccessful in collecting UA. INDIA Crews made aware.      Edwina Yanez RN  04/12/24 8194    "

## 2024-04-12 NOTE — ED NOTES
Patient unable to urinate at this time. Eleonora Juarez, PAC aware.  Patient given water.  Repositioned in bed and HOB raised.     Joanne Haq RN  04/12/24 9617

## 2024-04-12 NOTE — ED NOTES
Pt requested this RN to call Edd Zuniga for transportation home. Call unsuccessful. Message left. [phone].       Edwina Yanez RN  04/12/24 2894

## 2024-04-12 NOTE — ED NOTES
"Care Giver Clari Milanjanelleshyla contacted for transportation home. Care giver was transportation to ER. Care giver upset pt is being discharge home with UTI and states \"there is no one home and no one to get her\". Care giver states \"I only take care of her for two hours in the morning. I can try and call my son\" then caregiver hung up phone. Will continue to attempt to secure ride for pt home.      Edwina Yanez RN  04/12/24 9532    "

## 2024-04-12 NOTE — OCCUPATIONAL THERAPY NOTE
Occupational Therapy Evaluation     Patient Name: Kristen Maguire  Today's Date: 4/12/2024  Problem List  Active Problems:  There are no active Hospital Problems.    Past Medical History  Past Medical History:   Diagnosis Date    Arthritis     Cardiac disease     Gout     Lyme disease      Past Surgical History  Past Surgical History:   Procedure Laterality Date    CARDIAC PACEMAKER PLACEMENT      CHOLECYSTECTOMY      HYSTERECTOMY      JOINT REPLACEMENT          04/12/24 1510   OT Last Visit   OT Visit Date 04/12/24  (Friday)   Note Type   Note type Evaluation  (Eval [id number])   Additional Comments Identified pt by full name and birthdate   Pain Assessment   Pain Assessment Tool 0-10   Pain Score No Pain   Restrictions/Precautions   Weight Bearing Precautions Per Order No   Other Precautions Fall Risk  (recommend bed / chair alarm)   Home Living   Type of Home House   Home Layout One level;Bed/bath upstairs;Performs ADLs on one level   Bathroom Shower/Tub Walk-in shower   Bathroom Toilet Standard   Bathroom Equipment Shower chair;Grab bars in shower;Grab bars around toilet   Bathroom Accessibility Accessible   Home Equipment Walker;Grab bars;Cane;Wheelchair-manual  (rollator and RW)   Additional Comments Pt reports living alone in 1 SH   Prior Function   Level of [place] Needs assistance with IADLS;Independent with functional mobility;Needs assistance with ADLs   Lives With (S)  Alone   Receives Help From Outpatient therapy;Other (Comment)  (home health aide / caregiver 8AM-12; hx OPPT)   IADLs Family/Friend/Other provides transportation;Family/Friend/Other provides meals;Family/Friend/Other provides medication management  (pt reports she has a car but does not drive)   Falls in the last 6 months 0   Vocational Retired   Comments Pt reports use of rollator for functional mobility and needs assistance w/ IADLs. Pt reports mod I for + time basic ADLs and added that her aide assists becuase it is faster  "  Lifestyle   Autonomy Pt reports mod I w/ basic ADLs using rollator   Reciprocal Relationships Pt reports living alone and has home health aide that assists as needed w/ ADLs and w/ all IADLs.   Service to Others Pt reports retired    Intrinsic Gratification Pt reports enjoying reading and painting / drawing   General   Additional Pertinent History Pt admitted to ED at [facility] on 4/12/24 w/   Family/Caregiver Present No   Additional General Comments Personal and environmental factors supporting performance includes first floor set- up, access to AD/ DME; barriers include advanced age, limited support / assist and inability to complete IADLs.   Subjective   Subjective \"I am dizzy up to here and I will got down if it goes any higher\"   ADL   Where Assessed   (edge of stretcher vs bathroom)   Eating Assistance 7  Independent   Grooming Assistance 5  Supervision/Setup   Grooming Deficit Standing with assistive device   UB Bathing Assistance Unable to assess  (anticipate mod I seated after set- up based on fxal obs skills, clinical judgement)   LB Bathing Assistance Unable to assess  (anticipate S seated after set- up based on fxal obs skills, clinical judgement)   UB Dressing Assistance 6  Modified independent   UB Dressing Deficit Setup;Increased time to complete   LB Dressing Assistance 4  Minimal Assistance   LB Dressing Deficit Setup;Increased time to complete;Don/doff R shoe;Don/doff L shoe  (seated at edge of strether w/ feet off floor)   Toileting Assistance  5  Supervision/Setup   Toileting Deficit Setup;Increased time to complete;Grab bar use   Bed Mobility   Supine to Sit 5  Supervision   Additional items Assist x 1;Increased time required;Bedrails  (to pt's R on stretcher in ED)   Sit to Supine 5  Supervision   Additional items Assist x 1;Increased time required;Bedrails   Additional Comments Pt supine head of stretcher elevated upon arrival and post eval w/ needs met, call bell in reach "   Transfers   Sit to Stand 5  Supervision   Additional items Assist x 1   Stand to Sit 5  Supervision   Additional items Assist x 1   Toilet transfer 5  Supervision   Additional items Assist x 1   Additional Comments Pt performed sit <> stand 2 X w/ S   Functional Mobility   Functional Mobility 5  Supervision   Additional Comments engaged in functional mobiilty household distances w/ S using RW   Additional items Rolling walker   Balance   Static Sitting Fair +   Dynamic Sitting Fair   Static Standing Fair   Ambulatory Fair  (using RW)   Activity Tolerance   Activity Tolerance Patient limited by fatigue   Medical Staff Made Aware care coordination w/ PTVira due to decreased activity tolerance, regression from baseline   Nurse Made Aware spoke w/ RN and PA   RUE Assessment   RUE Assessment X  (limited AROM shoulder > approx 30*)   RUE Strength   RUE Overall Strength Deficits;Due to pain  (premorbid R shoulder deficits)   R Shoulder Flexion 2+/5   LUE Assessment   LUE Assessment X   LUE Strength   LUE Overall Strength Deficits;Due to pain   L Shoulder Flexion 3-/5   Hand Function   Gross Motor Coordination Functional   Fine Motor Coordination Functional   Hand Function Comments Pt reports R hand dominance   Cognition   Overall Cognitive Status WFL   Arousal/Participation Cooperative;Alert   Attention Attends with cues to redirect  (tangenetial, loquacious; benefits from redirection. Pt aware and reports that she is long winded)   Orientation Level Oriented X4   Memory Within functional limits  (appears WFL)   Following Commands Follows multistep commands with increased time or repetition   Comments Identified pt by full name and birthdate. Alert, generally oriented and able to follow directions during ADLs   Assessment   Limitation Decreased high-level ADLs;Decreased UE strength;Decreased UE ROM   Assessment Pt is an 89yo female presented to [facility] ED on 4/12/24 w/ foul smelling urine. Pt reports living alone in 1 SH  and mod I w/ ADLs using rollator. Pt reports having a caregiver / HHA that assists as needed. Significant PMH Impacting her occupational performance includes arthritis, Gout, pacemaker, a-fib. Upon eval, pt alert and oriented. Able to participate in conversation and follow directions w/ redirection at times due to loquacious. Pt required S to complete bed mobility, S sit <> stand, S using RW for functional mobility, S toileting, in A LBD, mod I UBD. Pt reports R hand dominance and demonstrated limited shoulder ROM. Pt reports premorbid shoulder deficits. Pt is completing ADLs at / near baseline level of I. Recommend level III rehab resource intensity when medically stable for discharge from acute care to improve engagement and max independence w/ her natural environment. Please re-consult OT as appropriate / change in functional status. DC OT   Goals   Patient Goals Pt stated that she would like to return home and draw   Plan   OT Frequency Eval only   Discharge Recommendation   Rehab Resource Intensity Level, OT III (Minimum Resource Intensity)   AM-PAC Daily Activity Inpatient   Lower Body Dressing 3   Bathing 3   Toileting 4   Upper Body Dressing 4   Grooming 4   Eating 4   Daily Activity Raw Score 22   Daily Activity Standardized Score (Calc for Raw Score >=11) 47.1   AM-PAC Applied Cognition Inpatient   Following a Speech/Presentation 3   Understanding Ordinary Conversation 4   Taking Medications 3   Remembering Where Things Are Placed or Put Away 4   Remembering List of 4-5 Errands 4   Taking Care of Complicated Tasks 3   Applied Cognition Raw Score 21   Applied Cognition Standardized Score 44.3   Barthel Index   Feeding 10   Bathing 0   Grooming Score 5   Dressing Score 5   Bladder Score 5   Bowels Score 10   Toilet Use Score 5   Transfers (Bed/Chair) Score 10   Mobility (Level Surface) Score 0   Stairs Score 5   Barthel Index Score 55   End of Consult   Education Provided Yes   Patient Position at End of  Consult Supine;All needs within reach   Nurse Communication Nurse aware of consult   Licensure   [place] License Number  Purnima Holcombjackbhakti, OTR/L [id number]       The patient's raw score on the AM-PAC Daily Activity Inpatient Short Form is 22. A raw score of greater than or equal to 19 suggests the patient may benefit from discharge to home. Please refer to the recommendation of the Occupational Therapist for safe discharge planning.    Purnima Gunderson, OTR/L  [id number]  [id number]

## 2024-04-12 NOTE — ED NOTES
This RN attempted to call pt daughter Yomaira with no success. No voicemail      Edwina Yanez RN  04/12/24 6356

## 2024-04-12 NOTE — PHYSICAL THERAPY NOTE
"   PT EVALUATION     04/12/24 1445   PT Last Visit   PT Visit Date 04/12/24   Note Type   Note type Evaluation   Pain Assessment   Pain Assessment Tool 0-10   Pain Score No Pain   Restrictions/Precautions   Other Precautions Fall Risk   Home Living   Type of Home House   Home Layout One level;Ramped entrance   Bathroom Equipment Shower chair   Home Equipment Walker;Wheelchair-manual   Prior Function   Level of [place] Independent with functional mobility;Needs assistance with ADLs  (Pt ambulates with a walker.)   Lives With Alone   Receives Help From Outpatient therapy  (home health aide 8-12am)   Falls in the last 6 months 0   General   Additional Pertinent History Pt admitted to the ED with possible UTI.  Pt reports she has been in bed x 1 week although pt was at OP PT yesterday.  Caregiver reports pt is more fatigued than usual.   Family/Caregiver Present No   Cognition   Overall Cognitive Status WFL   Arousal/Participation Alert   Attention Attends with cues to redirect   Orientation Level Oriented X4   Following Commands Follows multistep commands with increased time or repetition   Subjective   Subjective \"are you going to send me home?\"   RLE Assessment   RLE Assessment   (ROM WFL, MMT 4/5)   LLE Assessment   LLE Assessment   (ROM WFL, MMT 4/5)   Bed Mobility   Supine to Sit 5  Supervision   Additional items Increased time required   Sit to Supine 5  Supervision   Additional items Increased time required   Transfers   Sit to Stand 5  Supervision   Stand to Sit 5  Supervision   Ambulation/Elevation   Gait pattern   (slow steady gait)   Gait Assistance 5  Supervision   Assistive Device Rolling walker   Distance 75 feet   Balance   Static Sitting Fair +   Static Standing Fair   Ambulatory Fair   Assessment   Prognosis Good   Problem List Decreased mobility;Decreased endurance;Decreased strength   Assessment Pt is 88 y.o. female seen for PT evaluation s/p admit to [facility] on 4/12/2024 w/ c/o " "fatigue and possible UTI . PT consulted to assess pt's functional mobility and d/c needs. Order placed for PT eval and treat.  Co-morbidities affecting patient's physical performance include: afib, psychiatric disorder.  Personal factors affecting patient at time of initial evaluation include: ambulating with assistive device. Prior to admission, patient was independent with functional mobility with walker and requiring assist for ADLS.  Upon evaluation: pt was able to ambulate with a walker 75 feet with supervision assist.  Please find objective findings from Physical Therapy assessment regarding body systems outlined above with impairments and limitations including weakness, decreased activity tolerance, and fall risk.The Barthel Index was used as a functional outcome tool presenting with a score of Barthel Index Score: 55 today indicating marked limitations of functional mobility and ADLS.  Patient's clinical presentation is currently evolving as seen in patient's presentation of increased fall risk, new onset of impairment of functional mobility, and new onset of weakness. Pt would benefit from continued Physical Therapy treatment to address deficits as defined above and maximize level of functional mobility.     As demonstrated by objective findings, the assigned level of complexity for this evaluation is moderate.The patient's -[facility] Basic Mobility Inpatient Short Form Raw Score is 21. A Raw score of greater than 16 suggests the patient may benefit from discharge to home. Please also refer to the recommendation of the Physical Therapist for safe discharge planning.    Pt appears to be near her baseline level of function and should be safe to go home with assist of her caregiver.   Goals   Patient Goals \"I want more therapy\"   STG Expiration Date 04/19/24   Short Term Goal #1 Imporve ambulation tolerance to at least 150 feet to improve pt's tolerance to functional activity   LTG Expiration Date 04/26/24   Long " Term Goal #1 improve standing balane to at least fair + to decrease fall risk, pt will resume OP PT or OP fitness program to improve her balance and overall strength.   Plan   Treatment/Interventions Therapeutic exercise;LE strengthening/ROM;Endurance training   PT Frequency 2-3x/wk   Discharge Recommendation   Rehab Resource Intensity Level, PT III (Minimum Resource Intensity)   Equipment Recommended   (Pt has a walker)   AM-PAC Basic Mobility Inpatient   Turning in Flat Bed Without Bedrails 3   Lying on Back to Sitting on Edge of Flat Bed Without Bedrails 4   Moving Bed to Chair 3   Standing Up From Chair Using Arms 4   Walk in Room 4   Climb 3-5 Stairs With Railing 3   Basic Mobility Inpatient Raw Score 21   Basic Mobility Standardized Score 45.55   [facility] Highest Level Of Mobility   -HLM Goal 6: Walk 10 steps or more   -HLM Achieved 7: Walk 25 feet or more   Barthel Index   Feeding 10   Bathing 0   Grooming Score 5   Dressing Score 5   Bladder Score 5   Bowels Score 10   Toilet Use Score 5   Transfers (Bed/Chair) Score 15   Mobility (Level Surface) Score 0   Stairs Score 5   Barthel Index Score 60   End of Consult   Patient Position at End of Consult All needs within reach;Supine   Licensure   [place] License Number  Cornelia Arash PT [id number]

## 2024-04-14 LAB — BACTERIA UR CULT: ABNORMAL

## 2024-04-15 ENCOUNTER — APPOINTMENT (OUTPATIENT)
Dept: PHYSICAL THERAPY | Facility: CLINIC | Age: 89
End: 2024-04-15
Payer: MEDICARE

## 2024-04-15 ENCOUNTER — TELEPHONE (OUTPATIENT)
Dept: PHYSICAL THERAPY | Facility: CLINIC | Age: 89
End: 2024-04-15

## 2024-04-15 NOTE — TELEPHONE ENCOUNTER
Pt's caregiver presents to clinic. Informs primary PT that pt fell and is very sore in her knee. Per chart review, pt was in ED 4/12 for UTI. Caregiver states that she took pt to ED for UTI, she was released. Pt fell following release, called ambulance. She was assessed and assisted up. She was not taken to hospital. Caregiver states that she will not be attending appointment today but will be coming to appointment Thursday.    Radha Castro, PT, DPT

## 2024-04-18 ENCOUNTER — TELEPHONE (OUTPATIENT)
Dept: PHYSICAL THERAPY | Facility: CLINIC | Age: 89
End: 2024-04-18

## 2024-04-18 ENCOUNTER — APPOINTMENT (OUTPATIENT)
Dept: PHYSICAL THERAPY | Facility: CLINIC | Age: 89
End: 2024-04-18
Payer: MEDICARE

## 2024-04-19 ENCOUNTER — APPOINTMENT (OUTPATIENT)
Dept: PHYSICAL THERAPY | Facility: CLINIC | Age: 89
End: 2024-04-19
Payer: MEDICARE

## 2024-04-22 ENCOUNTER — OFFICE VISIT (OUTPATIENT)
Dept: PHYSICAL THERAPY | Facility: CLINIC | Age: 89
End: 2024-04-22
Payer: MEDICARE

## 2024-04-22 DIAGNOSIS — M25.611 SHOULDER STIFFNESS, RIGHT: ICD-10-CM

## 2024-04-22 DIAGNOSIS — M25.512 LEFT SHOULDER PAIN, UNSPECIFIED CHRONICITY: Primary | ICD-10-CM

## 2024-04-22 DIAGNOSIS — R53.1 GENERALIZED WEAKNESS: ICD-10-CM

## 2024-04-22 DIAGNOSIS — M25.511 RIGHT SHOULDER PAIN, UNSPECIFIED CHRONICITY: ICD-10-CM

## 2024-04-22 PROCEDURE — 97110 THERAPEUTIC EXERCISES: CPT

## 2024-04-22 NOTE — PROGRESS NOTES
Daily Note     Today's date: 2024  Patient name: Kristen Maguire  : 1935  MRN: [id number]  Referring provider: Mary Fajardo MD  Dx:   Encounter Diagnosis     ICD-10-CM    1. Left shoulder pain, unspecified chronicity  M25.512       2. Right shoulder pain, unspecified chronicity  M25.511       3. Generalized weakness  R53.1       4. Shoulder stiffness, right  M25.611                      Subjective: Pt was seen in ED 2024 for urinary tract infection per chart review. She says that she went to ED for pain in arm. Pt states that she was not admitted. Once home, she was walking toward bed room door and feel onto R knee which resulted in soreness. Ambulance came to assist pt off floor; pt was not taken to ER. Pt states that she is feeling okay now; states that weirdly her left knee is more uncomfortable than R; notes clicking in L knee. Pt has appointments with primary care and rheumatologist this week. She is going for a barium test. Pt states that her caregiver put cream on her R arm which helped to relieve pain.       Objective: See treatment diary below    Observation/ Inspection: No ecchymosis or discoloration noted along R knee.       Assessment: Pt able to tolerate static standing exercises today; however, did require rest d/t report of lightheadedness which subsided with rest. Vitals monitored throughout session. Pt with improved ability to WB into BUE with sit to stand transfer fro UE assist compared to previous session. Progress to standing dynamic exercise next session as appropriate. Added  squeeze vs foam to HEP. Tolerated treatment well. Patient would benefit from continued PT.       Plan: Continue per plan of care.  Progress treatment as tolerated.       Precautions:   *PACEMAKER*  *FALL RISK*    Referral: Help w/ frozen shoulder and help strengthen upper and lower body  Past Medical History:   Diagnosis Date    Arthritis     Cardiac disease     Gout     Lyme disease      Past  "Surgical History:   Procedure Laterality Date    CARDIAC PACEMAKER PLACEMENT      CHOLECYSTECTOMY      HYSTERECTOMY      JOINT REPLACEMENT         SOC: 1/23/2024  FOTO: 1/23/2024  POC Expiration: 4/16/2024  updated to 6/4/2024  Daily Treatment Log  Date: 4/11/2024 4/22/2024 Next session  4/4/2024 4/8/2024   Visit#/ auth: 16 17  14 15   Objective Measures        Observation/ Inspection  KE see objective      Shoulder AROM  Shoulder flexion  L =10 deg  R = 60 deg      Vitals Pre-tx   118/64 mmHg    Post -tx BP:  To 110/60 mmHg  SpO2: 94%  HR: 60 bpm Pre-tx   SpO2: 94%  HR: 60 bpm  130/68 mmHg    Mid tx  SpO2: 93%  HR: 61 bpm    Mid tx  SpO2: 92%  HR: 61 bpm    Post -tx BP:  To 114/60 mmHg  SpO2: 95%  HR: 63 bpm  Pre-tx   128/70 mmHg  HR: 60 bpm  SpO2 93%    Mid tx:  HR 84 bpm  SpO2: 91%    Post -tx BP:  To 120/64 mmHg  SpO2: 92%  HR: 60 bpm Pre-tx   140/64 mmHg  HR: 71 bpm  SpO2 91%      Post -tx BP:  To 124/60 mmHg  SpO2: 91%  HR: 70 bpm   mCTSIB        Manuals    3' 5'    Shoulder flex AROM w/ man assist    1 x 10 R/L  1 x 10 L  R unable d/t pain    Shoulder abd AROM w/ man assist    1 x 10 R/L                       Neuro Re-Ed    2'    FT EO/EC (CGA)        Tandem        Backward walking +CS/CGA        Tandem walking +CS/CGA         High march length of rail          Bicep curl stand for balance + UE strength Sitting 2 x 20 B/L Std 2 x 10 w/ CS  1 x 10 R/L w/ CS, RW in front              Ther Ex 45' 45'  30' 35'   Cervical extension AROM     1 x 10 1 x 10   Thoracic extension/ pec stretch sitting         LAQ   2 x 10 R/L alt  1 x 15 R/L alt 1 x 10 R/L alt   HR 2 x 20 seated 2 x 20 seated  2 x 20 seated 2 x 20 seated   TR    1 x 20 seated 1 x 20 seated    Seated marching 1 x 10 R/L  1 x 10 R/L     1 x 10 R/L unsupported    Std 2 x 10 R/L w/ RW and CGA   1 x 15 R/L alt    1 x 5 R/L w/ RW and CGA   Std knee flex    1 x 10 R/L w/ RW and CGA     squeeze  Unsupported sit 5\" x 5 R/L yellow foam   1 x 10 R/L  " "  Scapular retraction w/ low row   1 x 15 YTB supported sit  1 x 15 YTB supported sit    Clamshell hook-lying HOB elevated  Supported sit 2 x 20 RTB  Supported sit 2 x 20 RTB Supported sit 2 x 20 RTB    Bridges         Std hip ext/ abd        Pulleys        Shoulder Cane ROM        Scapular retraction  Seated W vs YTB 1 x 15     Unsupported W vs YTB 1 x 15   Seated W vs YTB 2 x 15     Objective measures: AROM, MMT, 5x STS, TUG KE       EDUCATION: Pathology, review of impairements, prognosis, activity modification, POC, and HEP KE; informed of THRIVE program Added  squeeze      Ther Activity    10'     STS    2 x 3 w/ CS    Ambulation in clinic    50 ft x 1 w/ RW and CS    Patient education on safety at home        Gait Training                          Modalities                          HEP:   Access Code: [email or web address]  URL: [email or web address]  Date: 03/14/2024  Prepared by: Radha Castro    Exercises  - Seated Long Arc Quad  - 2 x daily - 1 sets - 10 reps  - Seated March  - 2 x daily - 1 sets - 10 reps  - Sit to Stand with Counter Support  - 2 x daily - 1 sets - 3 reps  - Standing March with Counter Support  - 2 x daily - 1 sets - 10 reps  - Seated Heel Raise  - 2 x daily - 1 sets - 10 reps  - Seated Bilateral Shoulder External Rotation with Resistance  - 1 x daily - 1 sets - 10 reps   squeeze into yellow foam 5\" x 5 B/L (added 4/22/2024)         "

## 2024-04-24 ENCOUNTER — OFFICE VISIT (OUTPATIENT)
Dept: PHYSICAL THERAPY | Facility: CLINIC | Age: 89
End: 2024-04-24
Payer: MEDICARE

## 2024-04-24 DIAGNOSIS — M25.611 SHOULDER STIFFNESS, RIGHT: ICD-10-CM

## 2024-04-24 DIAGNOSIS — R53.1 GENERALIZED WEAKNESS: ICD-10-CM

## 2024-04-24 DIAGNOSIS — M25.511 RIGHT SHOULDER PAIN, UNSPECIFIED CHRONICITY: ICD-10-CM

## 2024-04-24 DIAGNOSIS — M25.512 LEFT SHOULDER PAIN, UNSPECIFIED CHRONICITY: Primary | ICD-10-CM

## 2024-04-24 PROCEDURE — 97110 THERAPEUTIC EXERCISES: CPT

## 2024-04-24 NOTE — PROGRESS NOTES
PT Discharge    Today's date: 2024  Patient name: Kristen Maguire  : 1935  MRN: [id number]  Referring provider: Mary Fajardo MD  Dx:   Encounter Diagnosis     ICD-10-CM    1. Left shoulder pain, unspecified chronicity  M25.512       2. Right shoulder pain, unspecified chronicity  M25.511       3. Generalized weakness  R53.1       4. Shoulder stiffness, right  M25.611                      Subjective: Pt states that she thinks she still has big problems in regard to pain in her shoulder pain; she has two additional doctors appointments this week; one with PCP and one w/ internist. Pt reports that she frequently feels as though she is going to faint. She throws up throughout the night and is dealing w/ extreme constipation. States that she has black and blue on her toe nails; states that she has to get to toe doctor to clip nails. She typically does not leave her house other than for doctors appointments. She has not been able to do much around her house recently. She complains about not having gone through her mail in a while. She has not felt well recently. Pt complains of R knee soreness d/t landing on this knee following discharge from hospital and L knee discomfort.       Objective: See treatment diary below    Inspection:  2024: Pt denies TTP distal to and at L knee. Pt TTP distal to R knee at medial aspect. No ecchymosis R knee noted. No increase in swelling, redness or warmth on R knee compared to contralateral LE.     Observation: Pt ambulating w/ RW w/ increased WB into AD. Decreased stride length.     Goals  Short term goals to be achieved by 4 weeks: 2024  STG1: Patient will be independent with simple HEP to maximize progress between therapy sessions (MET 2024)  STG2: Patient will improve BLE strength to no less than 4+/5 for improved 5X STS.  (Ongoing 2024)  STG3: Pt will demo functional shoulder ER reach to C7 bilaterally for improved ability to reach behind head for  bathing and to put on seat belt.  (Ongoing 4/11/2024)  STG4: Patient will improve TUG by 3 seconds to demo decreased risk for falls. (MET 2/29/2024)     Long Term Goals to be achieved by 12 weeks: 4/16/2024 updated to 6/4/2024  LTG1: Patient will be independent with updated HEP to demo progress toward discharge to HEP. (Ongoing 4/11/2024)  LTG2: Patient will improve 5X STS to 14.8 sec w/ BUE assist to demo improved BLE strength necessary for safe transfers chair to RW. (Ongoing 4/11/2024)  LTG3: Pt will demo B/L shoulder flex and abd AROM of at least 80 degrees to perform ADLs at chest height.  (Progressing 4/11/2024)  LTG4: Patient will improve TUG to less than 20 seconds (w/ RW + BUE assist for transfer) to return to independence with basic transfers.(Ongoing 4/11/2024)  LTG4: Pt will report RPE of no greater than 3/10 w/ 5xSTS to demo improved activity tolerance necessary for transfers, dressing, and ambulation in home. (Ongoing 4/11/2024)    Assessment: Pt with report of feeling faint t/o session which is familiar sensation. Pt required transition to supine w/ HOB elevated for relief in symptoms. Able to tolerate exercise in this position until bout of coughing which resulted in pt needing to clear throat in sitting. With return to sitting, pt again reports symptoms of dizziness and was returned to lying. Exercises in supine w/ HOB elevated limited due to knee and toe pain.  No report of dizziness on return to sitting at end of session; vitals within pt's norm. Pt and caregiver instructed to inform PCP of ongoing symptoms as described to PT t/o session including feeling faint, vomiting at night, and unchanging R shoulder pain. Instructed pt to discuss options of home PT/OT and or/ additional treatments as pt has demonstrated limited progress w/ PT and her sessions remain limited by ongoing health conditions. Pt agreeable to discuss ongoing symptoms w/MD. Pt is no longer a candidate for skilled PT due to lack of  progress and ongoing health conditions that require management at this time. Instructed pt to return to MD. Pt informed that she is welcome to return, however, emphasized importance of proper management of current health status at this time. Current episode of care to be discharged at this time.       Plan: Current episode of care to be discharged at this time.      Precautions:   *PACEMAKER*  *FALL RISK*    Referral: Help w/ frozen shoulder and help strengthen upper and lower body  Past Medical History:   Diagnosis Date    Arthritis     Cardiac disease     Gout     Lyme disease      Past Surgical History:   Procedure Laterality Date    CARDIAC PACEMAKER PLACEMENT      CHOLECYSTECTOMY      HYSTERECTOMY      JOINT REPLACEMENT         SOC: 1/23/2024  FOTO: 1/23/2024  POC Expiration: 4/16/2024  updated to 6/4/2024  Daily Treatment Log  Date: 4/11/2024 4/22/2024 4/24/2024 4/8/2024   Visit#/ auth: 16 17 18  15   Objective Measures        Observation/ Inspection  KE see objective      Shoulder AROM  Shoulder flexion  L =10 deg  R = 60 deg      Vitals Pre-tx   118/64 mmHg    Post -tx BP:  To 110/60 mmHg  SpO2: 94%  HR: 60 bpm Pre-tx   SpO2: 94%  HR: 60 bpm  130/68 mmHg    Mid tx  SpO2: 93%  HR: 61 bpm    Mid tx  SpO2: 92%  HR: 61 bpm    Post -tx BP:  To 114/60 mmHg  SpO2: 95%  HR: 63 bpm Mid seated exercise  HR: 60 bpm    Supine following seated exercises and report of symptoms  BP: 130/70mmHg  HR: 60 bpm  SpO2: 94%    Supine following second report of feeling faint momentarily after transitioning to sit  BP: 130/70mmHg  HR: 60 bpm  SpO2: 94%    Sit post session  BP: 130/70mmHg  HR: 60 bpm  SpO2: 94%    Pre-tx   140/64 mmHg  HR: 71 bpm  SpO2 91%      Post -tx BP:  To 124/60 mmHg  SpO2: 91%  HR: 70 bpm   mCTSIB        Manuals     5'    Shoulder flex AROM w/ man assist     1 x 10 L  R unable d/t pain    Shoulder abd AROM w/ man assist                          Neuro Re-Ed        FT EO/EC (CGA)        Tandem        Backward  "walking +CS/CGA        Tandem walking +CS/CGA         High march length of rail          Bicep curl stand for balance + UE strength Sitting 2 x 20 B/L Std 2 x 10 w/ CS               Ther Ex 45' 45' 40'  35'   Cervical extension AROM      1 x 10   Thoracic extension/ pec stretch sitting         LAQ   2 x 10 R/L alt 2 x 10 R/L alt  1 x 10 R/L alt   HR 2 x 20 seated 2 x 20 seated 2 x 20 seated  2 x 20 seated   TR   2 x 20 seated    1 x 5 R/L hook-lying - painful in toes  1 x 20 seated    Seated marching 1 x 10 R/L  1 x 10 R/L     1 x 10 R/L unsupported    Std 2 x 10 R/L w/ RW and CGA 1 x 10 R/L      Hook-lying 1 x 5 R/L painful in knees    1 x 15 R/L alt    1 x 5 R/L w/ RW and CGA   Std knee flex         squeeze  Unsupported sit 5\" x 5 R/L yellow foam   1 x 10 R/L    Scapular retraction w/ low row   1 x 15 YTB supported sit      Clamshell hook-lying HOB elevated  Supported sit 2 x 20 RTB Hook-lying 2 x 15 RTB  Supported sit 2 x 20 RTB    Bridges         Std hip ext/ abd        Pulleys        Shoulder Cane ROM        Scapular retraction  Seated W vs YTB 1 x 15     Unsupported W vs YTB 1 x 15       Objective measures: AROM, MMT, 5x STS, TUG KE       EDUCATION: Pathology, review of impairements, prognosis, activity modification, POC, and HEP KE; informed of THRIVE program Added  squeeze HEP updated and reviewed     Ther Activity         STS        Ambulation in clinic        Patient education on safety at home        Gait Training                          Modalities                          HEP:   Access Code: [email or web address]  URL: [email or web address]  Date: 04/24/2024  Prepared by: Radha Castro    Exercises  - Seated Long Arc Quad  - 2 x daily - 1 sets - 10 reps  - Seated March  - 2 x daily - 1 sets - 10 reps  - Sit to Stand with Counter Support  - 2 x daily - 1 sets - 3 reps  - Standing March with Counter Support  - 2 x daily - 1 sets - 10 reps  - Seated Heel Raise  - 2 x daily - 1 sets - 10 reps  - " Seated Bilateral Shoulder External Rotation with Resistance  - 1 x daily - 1 sets - 10 reps  - Putty Squeezes  - 3 x daily - 1 sets - 10 reps - 5 seconds hold

## 2024-04-30 ENCOUNTER — HOSPITAL ENCOUNTER (OUTPATIENT)
Dept: RADIOLOGY | Facility: HOSPITAL | Age: 89
Discharge: HOME | End: 2024-04-30
Attending: INTERNAL MEDICINE
Payer: MEDICARE

## 2024-04-30 DIAGNOSIS — R13.10 DYSPHAGIA, UNSPECIFIED TYPE: ICD-10-CM

## 2024-04-30 PROCEDURE — 74220 X-RAY XM ESOPHAGUS 1CNTRST: CPT

## 2024-07-22 ENCOUNTER — APPOINTMENT (EMERGENCY)
Dept: RADIOLOGY | Facility: HOSPITAL | Age: 89
DRG: 641 | End: 2024-07-22
Payer: MEDICARE

## 2024-07-22 ENCOUNTER — HOSPITAL ENCOUNTER (INPATIENT)
Facility: HOSPITAL | Age: 89
LOS: 7 days | Discharge: NURSING, REHAB OR LONG-TERM CARE | DRG: 641 | End: 2024-07-29
Attending: EMERGENCY MEDICINE | Admitting: STUDENT IN AN ORGANIZED HEALTH CARE EDUCATION/TRAINING PROGRAM
Payer: MEDICARE

## 2024-07-22 DIAGNOSIS — E87.1 HYPONATREMIA: ICD-10-CM

## 2024-07-22 DIAGNOSIS — Z87.39 HISTORY OF GOUT: ICD-10-CM

## 2024-07-22 DIAGNOSIS — I48.91 ATRIAL FIBRILLATION, UNSPECIFIED TYPE (HCC): ICD-10-CM

## 2024-07-22 DIAGNOSIS — E83.52 HYPERCALCEMIA: Primary | ICD-10-CM

## 2024-07-22 DIAGNOSIS — K59.00 CONSTIPATION: ICD-10-CM

## 2024-07-22 DIAGNOSIS — C79.9 METASTATIC DISEASE (HCC): ICD-10-CM

## 2024-07-22 DIAGNOSIS — R11.10 VOMITING: ICD-10-CM

## 2024-07-22 PROBLEM — K59.09 OTHER CONSTIPATION: Status: ACTIVE | Noted: 2024-07-22

## 2024-07-22 LAB
2HR DELTA HS TROPONIN: -1 NG/L
ALBUMIN SERPL BCG-MCNC: 3.5 G/DL (ref 3.5–5)
ALP SERPL-CCNC: 68 U/L (ref 34–104)
ALT SERPL W P-5'-P-CCNC: 7 U/L (ref 7–52)
AMMONIA PLAS-SCNC: 15 UMOL/L (ref 18–72)
ANION GAP SERPL CALCULATED.3IONS-SCNC: 8 MMOL/L (ref 4–13)
AST SERPL W P-5'-P-CCNC: 14 U/L (ref 13–39)
ATRIAL RATE: 60 BPM
BASOPHILS # BLD AUTO: 0.05 THOUSANDS/ÂΜL (ref 0–0.1)
BASOPHILS NFR BLD AUTO: 0 % (ref 0–1)
BILIRUB SERPL-MCNC: 0.55 MG/DL (ref 0.2–1)
BNP SERPL-MCNC: 55 PG/ML (ref 0–100)
BUN SERPL-MCNC: 19 MG/DL (ref 5–25)
CA-I BLD-SCNC: 1.69 MMOL/L (ref 1.12–1.32)
CALCIUM SERPL-MCNC: 13.5 MG/DL (ref 8.4–10.2)
CARDIAC TROPONIN I PNL SERPL HS: 13 NG/L
CARDIAC TROPONIN I PNL SERPL HS: 14 NG/L
CHLORIDE SERPL-SCNC: 98 MMOL/L (ref 96–108)
CO2 SERPL-SCNC: 27 MMOL/L (ref 21–32)
CREAT SERPL-MCNC: 0.89 MG/DL (ref 0.6–1.3)
EOSINOPHIL # BLD AUTO: 0.14 THOUSAND/ÂΜL (ref 0–0.61)
EOSINOPHIL NFR BLD AUTO: 1 % (ref 0–6)
ERYTHROCYTE [DISTWIDTH] IN BLOOD BY AUTOMATED COUNT: 17.5 % (ref 11.6–15.1)
FLUAV RNA RESP QL NAA+PROBE: NEGATIVE
FLUBV RNA RESP QL NAA+PROBE: NEGATIVE
GFR SERPL CREATININE-BSD FRML MDRD: 58 ML/MIN/1.73SQ M
GLUCOSE SERPL-MCNC: 85 MG/DL (ref 65–140)
HCT VFR BLD AUTO: 36 % (ref 34.8–46.1)
HGB BLD-MCNC: 11 G/DL (ref 11.5–15.4)
IMM GRANULOCYTES # BLD AUTO: 0.05 THOUSAND/UL (ref 0–0.2)
IMM GRANULOCYTES NFR BLD AUTO: 0 % (ref 0–2)
LYMPHOCYTES # BLD AUTO: 1.85 THOUSANDS/ÂΜL (ref 0.6–4.47)
LYMPHOCYTES NFR BLD AUTO: 13 % (ref 14–44)
MAGNESIUM SERPL-MCNC: 2.2 MG/DL (ref 1.9–2.7)
MCH RBC QN AUTO: 24.7 PG (ref 26.8–34.3)
MCHC RBC AUTO-ENTMCNC: 30.6 G/DL (ref 31.4–37.4)
MCV RBC AUTO: 81 FL (ref 82–98)
MONOCYTES # BLD AUTO: 1.06 THOUSAND/ÂΜL (ref 0.17–1.22)
MONOCYTES NFR BLD AUTO: 8 % (ref 4–12)
NEUTROPHILS # BLD AUTO: 10.69 THOUSANDS/ÂΜL (ref 1.85–7.62)
NEUTS SEG NFR BLD AUTO: 78 % (ref 43–75)
NRBC BLD AUTO-RTO: 0 /100 WBCS
PLATELET # BLD AUTO: 498 THOUSANDS/UL (ref 149–390)
PMV BLD AUTO: 9.6 FL (ref 8.9–12.7)
POTASSIUM SERPL-SCNC: 3.6 MMOL/L (ref 3.5–5.3)
PR INTERVAL: 282 MS
PROT SERPL-MCNC: 7.3 G/DL (ref 6.4–8.4)
QRS AXIS: -37 DEGREES
QRSD INTERVAL: 180 MS
QT INTERVAL: 486 MS
QTC INTERVAL: 486 MS
RBC # BLD AUTO: 4.45 MILLION/UL (ref 3.81–5.12)
RSV RNA RESP QL NAA+PROBE: NEGATIVE
SARS-COV-2 RNA RESP QL NAA+PROBE: NEGATIVE
SODIUM SERPL-SCNC: 133 MMOL/L (ref 135–147)
T WAVE AXIS: 117 DEGREES
VENTRICULAR RATE: 60 BPM
WBC # BLD AUTO: 13.84 THOUSAND/UL (ref 4.31–10.16)

## 2024-07-22 PROCEDURE — 74176 CT ABD & PELVIS W/O CONTRAST: CPT

## 2024-07-22 PROCEDURE — 93005 ELECTROCARDIOGRAM TRACING: CPT

## 2024-07-22 PROCEDURE — 71045 X-RAY EXAM CHEST 1 VIEW: CPT

## 2024-07-22 PROCEDURE — 99291 CRITICAL CARE FIRST HOUR: CPT | Performed by: EMERGENCY MEDICINE

## 2024-07-22 PROCEDURE — 83880 ASSAY OF NATRIURETIC PEPTIDE: CPT | Performed by: EMERGENCY MEDICINE

## 2024-07-22 PROCEDURE — 83735 ASSAY OF MAGNESIUM: CPT | Performed by: EMERGENCY MEDICINE

## 2024-07-22 PROCEDURE — 36415 COLL VENOUS BLD VENIPUNCTURE: CPT | Performed by: EMERGENCY MEDICINE

## 2024-07-22 PROCEDURE — 99222 1ST HOSP IP/OBS MODERATE 55: CPT | Performed by: INTERNAL MEDICINE

## 2024-07-22 PROCEDURE — 82140 ASSAY OF AMMONIA: CPT | Performed by: EMERGENCY MEDICINE

## 2024-07-22 PROCEDURE — 82330 ASSAY OF CALCIUM: CPT | Performed by: EMERGENCY MEDICINE

## 2024-07-22 PROCEDURE — 96372 THER/PROPH/DIAG INJ SC/IM: CPT

## 2024-07-22 PROCEDURE — 0241U HB NFCT DS VIR RESP RNA 4 TRGT: CPT | Performed by: EMERGENCY MEDICINE

## 2024-07-22 PROCEDURE — 96374 THER/PROPH/DIAG INJ IV PUSH: CPT

## 2024-07-22 PROCEDURE — 85025 COMPLETE CBC W/AUTO DIFF WBC: CPT | Performed by: EMERGENCY MEDICINE

## 2024-07-22 PROCEDURE — 80053 COMPREHEN METABOLIC PANEL: CPT | Performed by: EMERGENCY MEDICINE

## 2024-07-22 PROCEDURE — 99285 EMERGENCY DEPT VISIT HI MDM: CPT

## 2024-07-22 PROCEDURE — 70450 CT HEAD/BRAIN W/O DYE: CPT

## 2024-07-22 PROCEDURE — 93010 ELECTROCARDIOGRAM REPORT: CPT | Performed by: INTERNAL MEDICINE

## 2024-07-22 PROCEDURE — 84484 ASSAY OF TROPONIN QUANT: CPT | Performed by: EMERGENCY MEDICINE

## 2024-07-22 RX ORDER — AZITHROMYCIN 250 MG/1
TABLET, FILM COATED ORAL
COMMUNITY
Start: 2024-07-08

## 2024-07-22 RX ORDER — FAMOTIDINE 20 MG/1
40 TABLET, FILM COATED ORAL DAILY
Status: DISCONTINUED | OUTPATIENT
Start: 2024-07-23 | End: 2024-07-29 | Stop reason: HOSPADM

## 2024-07-22 RX ORDER — POLYETHYLENE GLYCOL 3350 17 G/17G
17 POWDER, FOR SOLUTION ORAL DAILY
Status: DISCONTINUED | OUTPATIENT
Start: 2024-07-23 | End: 2024-07-29 | Stop reason: HOSPADM

## 2024-07-22 RX ORDER — ACETAMINOPHEN 325 MG/1
650 TABLET ORAL EVERY 6 HOURS PRN
Status: DISCONTINUED | OUTPATIENT
Start: 2024-07-22 | End: 2024-07-29 | Stop reason: HOSPADM

## 2024-07-22 RX ORDER — CALCITONIN SALMON 200 [USP'U]/ML
4 INJECTION, SOLUTION INTRAMUSCULAR; SUBCUTANEOUS ONCE
Status: COMPLETED | OUTPATIENT
Start: 2024-07-22 | End: 2024-07-22

## 2024-07-22 RX ORDER — SENNOSIDES 8.6 MG
1 TABLET ORAL DAILY
Status: DISCONTINUED | OUTPATIENT
Start: 2024-07-23 | End: 2024-07-29 | Stop reason: HOSPADM

## 2024-07-22 RX ORDER — MONTELUKAST SODIUM 10 MG/1
10 TABLET ORAL EVERY EVENING
Status: DISCONTINUED | OUTPATIENT
Start: 2024-07-23 | End: 2024-07-29 | Stop reason: HOSPADM

## 2024-07-22 RX ORDER — RISPERIDONE 0.25 MG/1
0.25 TABLET ORAL DAILY
Status: DISCONTINUED | OUTPATIENT
Start: 2024-07-23 | End: 2024-07-29 | Stop reason: HOSPADM

## 2024-07-22 RX ORDER — CARVEDILOL 3.12 MG/1
3.12 TABLET ORAL 2 TIMES DAILY WITH MEALS
Status: DISCONTINUED | OUTPATIENT
Start: 2024-07-23 | End: 2024-07-29 | Stop reason: HOSPADM

## 2024-07-22 RX ORDER — BISACODYL 10 MG
10 SUPPOSITORY, RECTAL RECTAL DAILY PRN
Status: DISCONTINUED | OUTPATIENT
Start: 2024-07-22 | End: 2024-07-29 | Stop reason: HOSPADM

## 2024-07-22 RX ORDER — DOCUSATE SODIUM 100 MG/1
100 CAPSULE, LIQUID FILLED ORAL 2 TIMES DAILY
Status: DISCONTINUED | OUTPATIENT
Start: 2024-07-22 | End: 2024-07-29 | Stop reason: HOSPADM

## 2024-07-22 RX ORDER — BISACODYL 10 MG
10 SUPPOSITORY, RECTAL RECTAL ONCE
Status: COMPLETED | OUTPATIENT
Start: 2024-07-22 | End: 2024-07-23

## 2024-07-22 RX ORDER — ONDANSETRON 4 MG/1
TABLET, FILM COATED ORAL
COMMUNITY
Start: 2024-06-07

## 2024-07-22 RX ORDER — COLCHICINE 0.6 MG/1
0.6 TABLET ORAL DAILY
Status: DISCONTINUED | OUTPATIENT
Start: 2024-07-23 | End: 2024-07-29 | Stop reason: HOSPADM

## 2024-07-22 RX ORDER — ALLOPURINOL 100 MG/1
100 TABLET ORAL DAILY
Status: DISCONTINUED | OUTPATIENT
Start: 2024-07-23 | End: 2024-07-29 | Stop reason: HOSPADM

## 2024-07-22 RX ORDER — ONDANSETRON 2 MG/ML
4 INJECTION INTRAMUSCULAR; INTRAVENOUS EVERY 6 HOURS PRN
Status: DISCONTINUED | OUTPATIENT
Start: 2024-07-22 | End: 2024-07-29 | Stop reason: HOSPADM

## 2024-07-22 RX ORDER — VANCOMYCIN HYDROCHLORIDE 125 MG/1
CAPSULE ORAL
COMMUNITY
Start: 2024-06-13 | End: 2024-07-29

## 2024-07-22 RX ORDER — CLINDAMYCIN HYDROCHLORIDE 300 MG/1
300 CAPSULE ORAL EVERY 8 HOURS
COMMUNITY
Start: 2024-06-17 | End: 2024-07-29

## 2024-07-22 RX ORDER — ONDANSETRON 2 MG/ML
4 INJECTION INTRAMUSCULAR; INTRAVENOUS ONCE
Status: COMPLETED | OUTPATIENT
Start: 2024-07-22 | End: 2024-07-22

## 2024-07-22 RX ORDER — SERTRALINE HYDROCHLORIDE 25 MG/1
25 TABLET, FILM COATED ORAL DAILY
Status: DISCONTINUED | OUTPATIENT
Start: 2024-07-23 | End: 2024-07-29 | Stop reason: HOSPADM

## 2024-07-22 RX ORDER — SODIUM CHLORIDE 9 MG/ML
50 INJECTION, SOLUTION INTRAVENOUS CONTINUOUS
Status: DISCONTINUED | OUTPATIENT
Start: 2024-07-22 | End: 2024-07-26

## 2024-07-22 RX ADMIN — CALCITONIN SALMON 380 UNITS: 200 INJECTION, SOLUTION INTRAMUSCULAR; SUBCUTANEOUS at 16:33

## 2024-07-22 RX ADMIN — DOCUSATE SODIUM 100 MG: 100 CAPSULE, LIQUID FILLED ORAL at 20:01

## 2024-07-22 RX ADMIN — SODIUM CHLORIDE 50 ML/HR: 0.9 INJECTION, SOLUTION INTRAVENOUS at 20:02

## 2024-07-22 RX ADMIN — ONDANSETRON 4 MG: 2 INJECTION INTRAMUSCULAR; INTRAVENOUS at 12:17

## 2024-07-22 RX ADMIN — APIXABAN 5 MG: 5 TABLET, FILM COATED ORAL at 20:01

## 2024-07-22 RX ADMIN — SODIUM CHLORIDE 1000 ML: 0.9 INJECTION, SOLUTION INTRAVENOUS at 16:31

## 2024-07-22 NOTE — ED PROVIDER NOTES
History  Chief Complaint   Patient presents with    Weakness - Generalized     Pt. States a few days of vomiting and   unable to keep anything down no diarrhea.pt.states overall weakness at this time    Vomiting    Abdominal Pain     Patient is an 88-year-old female with a history of liver cancer, pancreatic cancer, CAD presents to the emergency department with complaint of vomiting and generalized weakness.  Patient states that symptoms have been ongoing for few days.  She denies abdominal pain or diarrhea.  Patient is quite forgetful during my initial evaluation.      History provided by:  Patient  History limited by:  Mental status change   used: No        Prior to Admission Medications   Prescriptions Last Dose Informant Patient Reported? Taking?   Apixaban (ELIQUIS PO) Unknown Self Yes No   Sig: Take 5 mg by mouth 2 (two) times a day   Diclofenac Sodium (VOLTAREN) 1 % Unknown Self No No   Sig: Apply 2 g topically 4 (four) times a day   acetaminophen (TYLENOL) 325 mg tablet Unknown Outside Facility (Specify), Self Yes No   Sig: Take 650 mg by mouth every 6 (six) hours as needed for mild pain   albuterol (PROVENTIL HFA,VENTOLIN HFA) 90 mcg/act inhaler  Self Yes No   allopurinol (ZYLOPRIM) 100 mg tablet Unknown Self Yes No   azithromycin (ZITHROMAX) 250 mg tablet   Yes No   bisacodyl (bisacodyl) 5 mg EC tablet Unknown Self Yes No   Sig: Take 5 mg by mouth daily as needed for constipation   carvedilol (COREG) 3.125 mg tablet Unknown Self Yes No   carvedilol (COREG) 6.25 mg tablet Unknown Self Yes No   Sig: Take 6.25 mg by mouth in the morning   Patient not taking: Reported on 4/4/2024   clindamycin (CLEOCIN) 300 MG capsule Unknown  Yes No   Sig: Take 300 mg by mouth every 8 (eight) hours   clotrimazole (LOTRIMIN) 1 % cream  Self No No   Sig: Apply topically 2 (two) times a day   colchicine (COLCRYS) 0.6 mg tablet Unknown Self Yes No   dextromethorphan-guaiFENesin (ROBITUSSIN DM)  mg/5 mL  syrup  Self No No   Sig: Take 10 mL by mouth every 4 (four) hours as needed for cough or congestion   Patient not taking: Reported on 1/13/2024   docusate sodium (COLACE) 100 mg capsule Unknown Self Yes No   Sig: Take 100 mg by mouth 2 (two) times a day   estradiol (ESTRACE) 0.1 mg/g vaginal cream Unknown Self Yes No   Sig: Not taking currently but will 4/4/24   famotidine (PEPCID) 40 MG tablet Unknown Self Yes No   Sig: Take 40 mg by mouth daily   febuxostat (ULORIC) 40 mg tablet  Self Yes No   Patient not taking: Reported on 4/4/2024   furosemide (LASIX) 20 mg tablet  Self Yes No   Sig: Take 20 mg by mouth in the morning   Patient not taking: Reported on 4/4/2024   furosemide (LASIX) 40 mg tablet Unknown Self Yes No   lidocaine (Lidoderm) 5 %  Self No No   Sig: Apply 1 patch topically over 12 hours daily Remove & Discard patch within 12 hours or as directed by MD   Patient not taking: Reported on 4/4/2024   montelukast (SINGULAIR) 10 mg tablet Unknown Self Yes No   nitrofurantoin (MACROBID) 100 mg capsule Unknown Self Yes No   ondansetron (ZOFRAN) 4 mg tablet Unknown  Yes No   ondansetron (ZOFRAN-ODT) 4 mg disintegrating tablet   No No   Sig: Take 1 tablet (4 mg total) by mouth every 6 (six) hours as needed for nausea for up to 3 days   Patient not taking: Reported on 4/4/2024   pantoprazole (PROTONIX) 20 mg tablet  Self No No   Sig: Take 1 tablet (20 mg total) by mouth daily   Patient not taking: Reported on 4/4/2024   risperiDONE (RisperDAL) 0.25 mg tablet Unknown Self Yes No   Sig: Take 0.25 mg by mouth daily   sertraline (ZOLOFT) 25 mg tablet Unknown Self Yes No   Sig: Take 25 mg by mouth daily   vancomycin (VANCOCIN) 125 MG capsule Unknown  Yes No      Facility-Administered Medications: None       Past Medical History:   Diagnosis Date    Arthritis     Cardiac disease     Gout     Liver cancer (HCC)     Lyme disease     Pancreatic cancer (HCC)        Past Surgical History:   Procedure Laterality Date     CARDIAC PACEMAKER PLACEMENT      CHOLECYSTECTOMY      HYSTERECTOMY      JOINT REPLACEMENT         History reviewed. No pertinent family history.  I have reviewed and agree with the history as documented.    E-Cigarette/Vaping    E-Cigarette Use Never User      E-Cigarette/Vaping Substances    Nicotine No     THC No     CBD No     Flavoring No     Other No     Unknown No      Social History     Tobacco Use    Smoking status: Never    Smokeless tobacco: Never   Vaping Use    Vaping status: Never Used   Substance Use Topics    Alcohol use: Never    Drug use: No       Review of Systems   Constitutional:  Negative for chills and fever.   Respiratory:  Negative for cough, chest tightness and shortness of breath.    Gastrointestinal:  Positive for vomiting. Negative for abdominal pain, diarrhea and nausea.   Genitourinary:  Negative for dysuria, frequency, hematuria and urgency.   Musculoskeletal:  Negative for back pain, neck pain and neck stiffness.   Neurological:  Positive for weakness.   All other systems reviewed and are negative.      Physical Exam  Physical Exam  Vitals and nursing note reviewed.   Constitutional:       General: She is not in acute distress.     Appearance: She is well-developed. She is not diaphoretic.   HENT:      Head: Normocephalic and atraumatic.   Eyes:      Conjunctiva/sclera: Conjunctivae normal.      Pupils: Pupils are equal, round, and reactive to light.   Cardiovascular:      Rate and Rhythm: Normal rate and regular rhythm.      Heart sounds: Normal heart sounds. No murmur heard.  Pulmonary:      Effort: Pulmonary effort is normal. No respiratory distress.      Breath sounds: Normal breath sounds.   Abdominal:      General: Abdomen is protuberant. Bowel sounds are normal. There is no distension.      Palpations: Abdomen is soft.      Tenderness: There is no abdominal tenderness.   Musculoskeletal:         General: No deformity. Normal range of motion.      Cervical back: Normal range of  motion and neck supple.   Skin:     General: Skin is warm and dry.      Capillary Refill: Capillary refill takes less than 2 seconds.      Coloration: Skin is not pale.      Findings: No rash.   Neurological:      Mental Status: She is alert.      Cranial Nerves: No cranial nerve deficit.   Psychiatric:         Behavior: Behavior normal.         Vital Signs  ED Triage Vitals   Temperature Pulse Respirations Blood Pressure SpO2   07/22/24 1142 07/22/24 1142 07/22/24 1142 07/22/24 1142 07/22/24 1142   98 °F (36.7 °C) 60 20 130/58 94 %      Temp Source Heart Rate Source Patient Position - Orthostatic VS BP Location FiO2 (%)   07/22/24 1142 07/22/24 1142 07/22/24 1142 07/22/24 1142 --   Oral Monitor Lying Left arm       Pain Score       07/22/24 1845       No Pain           Vitals:    07/25/24 1550 07/25/24 1711 07/25/24 1929 07/26/24 0001   BP: 108/56 109/57 109/57 115/58   Pulse: 60 61 61 62   Patient Position - Orthostatic VS:             Visual Acuity  Visual Acuity      Flowsheet Row Most Recent Value   L Pupil Size (mm) 3   R Pupil Size (mm) 3            ED Medications  Medications   allopurinol (ZYLOPRIM) tablet 100 mg (100 mg Oral Given 7/25/24 1053)   apixaban (ELIQUIS) tablet 5 mg (5 mg Oral Given 7/25/24 2221)   carvedilol (COREG) tablet 3.125 mg (3.125 mg Oral Not Given 7/25/24 1712)   colchicine (COLCRYS) tablet 0.6 mg (0.6 mg Oral Given 7/25/24 1053)   famotidine (PEPCID) tablet 40 mg (40 mg Oral Given 7/25/24 1052)   montelukast (SINGULAIR) tablet 10 mg (10 mg Oral Given 7/25/24 1709)   risperiDONE (RisperDAL) tablet 0.25 mg (0.25 mg Oral Given 7/25/24 1052)   sertraline (ZOLOFT) tablet 25 mg (25 mg Oral Given 7/25/24 1053)   sodium chloride 0.9 % infusion (100 mL/hr Intravenous New Bag 7/25/24 2222)   acetaminophen (TYLENOL) tablet 650 mg (has no administration in time range)   bisacodyl (DULCOLAX) rectal suppository 10 mg (has no administration in time range)   polyethylene glycol (MIRALAX) packet 17 g  (17 g Oral Not Given 7/25/24 1052)   senna (SENOKOT) tablet 8.6 mg (8.6 mg Oral Given 7/25/24 1053)   docusate sodium (COLACE) capsule 100 mg (100 mg Oral Given 7/25/24 1709)   ondansetron (ZOFRAN) injection 4 mg (4 mg Intravenous Given 7/23/24 0507)   bumetanide (BUMEX) injection 1 mg (1 mg Intravenous Given 7/25/24 1929)   ondansetron (ZOFRAN) injection 4 mg (4 mg Intravenous Given 7/22/24 1217)   sodium chloride 0.9 % bolus 1,000 mL (1,000 mL Intravenous New Bag 7/22/24 1631)   calcitonin (salmon) (MIACALCIN) injection 380 Units (380 Units Intramuscular Given 7/22/24 1633)   bisacodyl (DULCOLAX) rectal suppository 10 mg (10 mg Rectal Given 7/23/24 0919)   potassium chloride (Klor-Con M20) CR tablet 40 mEq (40 mEq Oral Given 7/23/24 0633)   bumetanide (BUMEX) injection 1 mg (1 mg Intravenous Given 7/23/24 0859)   calcitonin (salmon) (MIACALCIN) injection 100 Units (100 Units Intramuscular Given 7/23/24 1011)   potassium chloride oral solution 40 mEq (40 mEq Oral Given 7/23/24 1245)   potassium chloride (Klor-Con M20) CR tablet 40 mEq (40 mEq Oral Given 7/25/24 1052)   pamidronate (AREDIA) 30 mg in sodium chloride 0.9 % 250 mL IVPB (30 mg Intravenous New Bag 7/25/24 1055)       Diagnostic Studies  Results Reviewed       Procedure Component Value Units Date/Time    UA w Reflex to Microscopic w Reflex to Culture [[id number]]  (Abnormal) Collected: 07/25/24 2247    Lab Status: Final result Specimen: Urine, Straight Cath Updated: 07/25/24 2300     Color, UA Colorless     Clarity, UA Clear     Specific Gravity, UA 1.015     pH, UA 6.0     Leukocytes, UA Large     Nitrite, UA Negative     Protein, UA Negative mg/dl      Glucose, UA Negative mg/dl      Ketones, UA Negative mg/dl      Urobilinogen, UA <2.0 mg/dl      Bilirubin, UA Negative     Occult Blood, UA Negative    Calcium, ionized [[id number]]  (Abnormal) Collected: 07/22/24 1455    Lab Status: Final result Specimen: Blood from Arm, Left Updated: 07/22/24 7094      Calcium, Ionized 1.69 mmol/L     HS Troponin I 2hr [[id number]]  (Normal) Collected: 07/22/24 1452    Lab Status: Final result Specimen: Blood from Arm, Left Updated: 07/22/24 1533     hs TnI 2hr 13 ng/L      Delta 2hr hsTnI -1 ng/L     Comprehensive metabolic panel [[id number]]  (Abnormal) Collected: 07/22/24 1212    Lab Status: Final result Specimen: Blood from Arm, Right Updated: 07/22/24 1315     Sodium 133 mmol/L      Potassium 3.6 mmol/L      Chloride 98 mmol/L      CO2 27 mmol/L      ANION GAP 8 mmol/L      BUN 19 mg/dL      Creatinine 0.89 mg/dL      Glucose 85 mg/dL      Calcium 13.5 mg/dL      AST 14 U/L      ALT 7 U/L      Alkaline Phosphatase 68 U/L      Total Protein 7.3 g/dL      Albumin 3.5 g/dL      Total Bilirubin 0.55 mg/dL      eGFR 58 ml/min/1.73sq m     Narrative:      National Kid[employer] Foundation guidelines for Chronic Kidney Disease (CKD):     Stage 1 with normal or high GFR (GFR > 90 mL/min/1.73 square meters)    Stage 2 Mild CKD (GFR = 60-89 mL/min/1.73 square meters)    Stage 3A Moderate CKD (GFR = 45-59 mL/min/1.73 square meters)    Stage 3B Moderate CKD (GFR = 30-44 mL/min/1.73 square meters)    Stage 4 Severe CKD (GFR = 15-29 mL/min/1.73 square meters)    Stage 5 End Stage CKD (GFR <15 mL/min/1.73 square meters)  Note: GFR calculation is accurate only with a steady state creatinine    FLU/RSV/COVID - if FLU/RSV clinically relevant [[id number]]  (Normal) Collected: 07/22/24 1212    Lab Status: Final result Specimen: Nares from Nose Updated: 07/22/24 1259     SARS-CoV-2 Negative     INFLUENZA A PCR Negative     INFLUENZA B PCR Negative     RSV PCR Negative    Narrative:      FOR PEDIATRIC PATIENTS - copy/paste COVID Guidelines URL to browser: [email or web address]    SARS-CoV-2 assay is a Nucleic Acid Amplification assay intended for the  qualitative detection of nucleic acid from SARS-CoV-2 in nasopharyngeal  swabs. Results are for  the presumptive identification of SARS-CoV-2 RNA.    Positive results are indicative of infection with SARS-CoV-2, the virus  causing COVID-19, but do not rule out bacterial infection or co-infection  with other viruses. Laboratories within the [place] and its  territories are required to report all positive results to the appropriate  public health authorities. Negative results do not preclude SARS-CoV-2  infection and should not be used as the sole basis for treatment or other  patient management decisions. Negative results must be combined with  clinical observations, patient history, and epidemiological information.  This test has not been FDA cleared or approved.    This test has been authorized by FDA under an Emergency Use Authorization  (EUA). This test is only authorized for the duration of time the  declaration that circumstances exist justifying the authorization of the  emergency use of an in vitro diagnostic tests for detection of SARS-CoV-2  virus and/or diagnosis of COVID-19 infection under section 564(b)(1) of  the Act, 21 U.S.C. 360bbb-3(b)(1), unless the authorization is terminated  or revoked sooner. The test has been validated but independent review by FDA  and CLIA is pending.    Test performed using [employer] GeneXpert: This RT-PCR assay targets N2,  a region unique to SARS-CoV-2. A conserved region in the E-gene was chosen  for pan-Sarbecovirus detection which includes SARS-CoV-2.    According to CMS-2020-01-R, this platform meets the definition of high-throughput technology.    Magnesium [[id number]]  (Normal) Collected: 07/22/24 1212    Lab Status: Final result Specimen: Blood from Arm, Right Updated: 07/22/24 1258     Magnesium 2.2 mg/dL     Ammonia [[id number]]  (Abnormal) Collected: 07/22/24 1212    Lab Status: Final result Specimen: Blood from Arm, Right Updated: 07/22/24 1258     Ammonia 15 umol/L     HS Troponin 0hr (reflex protocol) [[id number]]  (Normal) Collected: 07/22/24 1212     Lab Status: Final result Specimen: Blood from Arm, Right Updated: 07/22/24 1248     hs TnI 0hr 14 ng/L     B-Type Natriuretic Peptide(BNP) [[id number]]  (Normal) Collected: 07/22/24 1212    Lab Status: Final result Specimen: Blood from Arm, Right Updated: 07/22/24 1247     BNP 55 pg/mL     CBC and differential [[id number]]  (Abnormal) Collected: 07/22/24 1212    Lab Status: Final result Specimen: Blood from Arm, Right Updated: 07/22/24 1223     WBC 13.84 Thousand/uL      RBC 4.45 Million/uL      Hemoglobin 11.0 g/dL      Hematocrit 36.0 %      MCV 81 fL      MCH 24.7 pg      MCHC 30.6 g/dL      RDW 17.5 %      MPV 9.6 fL      Platelets 498 Thousands/uL      nRBC 0 /100 WBCs      Segmented % 78 %      Immature Grans % 0 %      Lymphocytes % 13 %      Monocytes % 8 %      Eosinophils Relative 1 %      Basophils Relative 0 %      Absolute Neutrophils 10.69 Thousands/µL      Absolute Immature Grans 0.05 Thousand/uL      Absolute Lymphocytes 1.85 Thousands/µL      Absolute Monocytes 1.06 Thousand/µL      Eosinophils Absolute 0.14 Thousand/µL      Basophils Absolute 0.05 Thousands/µL                    CT abdomen pelvis wo contrast   Final Result by Max Kaye MD (07/22 1607)      Fluid-filled distal esophagus.   The stomach is unremarkable without hernia.   No evidence of obstruction.      Stable appearance of an anterior pelvic abdominal wall fat-containing hernia with similar infiltration of the involved herniated fat.   Clinical correlation advised for focal pain and reducibility.      Enlarging central left hepatic lesion, not well evaluated on this noncontrast exam.   Interval decrease in size of a exophytic irregular lesion from the right kidney upper pole, possibly representing an involuting cyst component.   Grossly stable cystic pancreatic lesions as described, with large ill-defined lesion in the pancreatic head.      New enlarged abdominal nodes as described, measuring up to 3 cm.   New 5 mm left  lower lobe pulmonary nodule.      Overall the above findings are consistent with worsening malignancy and considerably worsening metastatic disease.   Limit evaluation on this noncontrast exam.   Postcontrast MRI versus CT recommended for further evaluation.         The study was marked in EPIC for immediate notification.      Workstation performed: [id number]         XR chest 1 view   Final Result by Devyn Scanlon MD (07/22 4697)      Prominent left paramediastinal density which may be accentuated due to positioning. Known left thyroid goiter. Follow-up with be recommended.      The study was marked in EPIC for immediate notification.      Workstation performed: [id number]         CT head without contrast   Final Result by Laci Miller MD (07/22 1400)      No acute intracranial abnormality.                  Workstation performed: [id number]                    Procedures  ECG 12 Lead Documentation Only    Date/Time: 7/22/2024 12:05 PM    Performed by: Edgar Dominique DO  Authorized by: Edgar Dominique DO    Indications / Diagnosis:  Weakness  ECG reviewed by me, the ED Provider: yes    Patient location:  ED  Previous ECG:     Previous ECG:  Compared to current    Similarity:  No change    Comparison to cardiac monitor: Yes    Interpretation:     Interpretation: abnormal    Rate:     ECG rate:  60bpm    ECG rate assessment: normal    Rhythm:     Rhythm: paced    Pacing:     Type of pacing:  Atrial  Ectopy:     Ectopy: none    QRS:     QRS axis:  Normal  Conduction:     Conduction: normal    ST segments:     ST segments:  Normal  T waves:     T waves: normal    CriticalCare Time    Date/Time: 7/22/2024 4:00 PM    Performed by: Edgar Dominique DO  Authorized by: Edgar Dominique DO    Critical care provider statement:     Critical care time (minutes):  65    Critical care time was exclusive of:  Separately billable procedures and treating other patients and teaching time    Critical  care was necessary to treat or prevent imminent or life-threatening deterioration of the following conditions:  Metabolic crisis and cardiac failure    Critical care was time spent personally by me on the following activities:  Blood draw for specimens, obtaining history from patient or surrogate, development of treatment plan with patient or surrogate, discussions with primary provider, evaluation of patient's response to treatment, examination of patient, interpretation of cardiac output measurements, ordering and review of laboratory studies, ordering and review of radiographic studies, ordering and performing treatments and interventions and review of old charts    I assumed direction of critical care for this patient from another provider in my specialty: no             ED Course                                 SBIRT 20yo+      Flowsheet Row Most Recent Value   Initial Alcohol Screen: US AUDIT-C     1. How often do you have a drink containing alcohol? 0 Filed at: 07/22/2024 1145   2. How many drinks containing alcohol do you have on a typical day you are drinking?  0 Filed at: 07/22/2024 1145   3a. Male UNDER 65: How often do you have five or more drinks on one occasion? 0 Filed at: 07/22/2024 1145   3b. FEMALE Any Age, or MALE 65+: How often do you have 4 or more drinks on one occassion? 0 Filed at: 07/22/2024 1145   Audit-C Score 0 Filed at: 07/22/2024 1145   BERNARDINO: How many times in the past year have you...    Used an illegal drug or used a prescription medication for non-medical reasons? Never Filed at: 07/22/2024 1145                      Medical Decision Making  88-year-old female with a history of metastatic cancer, in the ED with complaints of vomiting and abdominal pain.  Labs ordered and reviewed.  Patient noted to be significantly hypercalcemic, likely secondary to metastatic disease.  CT abdomen pelvis ordered and reviewed. -Consistent with worsening malignancy and worsening metastatic disease.   Will start patient on IV fluids and calcitonin and admit for further evaluation.    Amount and/or Complexity of Data Reviewed  Labs: ordered.  Radiology: ordered.    Risk  Prescription drug management.  Decision regarding hospitalization.                 Disposition  Final diagnoses:   Hypercalcemia   Vomiting   Constipation   Metastatic disease (HCC)     Time reflects when diagnosis was documented in both MDM as applicable and the Disposition within this note       Time User Action Codes Description Comment    7/22/2024  4:24 PM Oyesanmi, Olubusola O Add [E83.52] Hypercalcemia     7/22/2024  4:24 PM Oyesanmi, Olubusola O Add [R11.10] Vomiting     7/22/2024  4:24 PM Oyesanmi, Olubusola O Add [K59.00] Constipation     7/22/2024  4:24 PM Oyesanmi, Olubusola O Add [C79.9] Metastatic disease (HCC)           ED Disposition       ED Disposition   Admit    Condition   Stable    Date/Time   Mon Jul 22, 2024 1624    Comment   Case was discussed with Dr Haro and the patient's admission status was agreed to be Admission Status: inpatient status to the service of Dr. Haro .               Follow-up Information    None         Current Discharge Medication List        CONTINUE these medications which have NOT CHANGED    Details   acetaminophen (TYLENOL) 325 mg tablet Take 650 mg by mouth every 6 (six) hours as needed for mild pain      albuterol (PROVENTIL HFA,VENTOLIN HFA) 90 mcg/act inhaler       allopurinol (ZYLOPRIM) 100 mg tablet       Apixaban (ELIQUIS PO) Take 5 mg by mouth 2 (two) times a day      azithromycin (ZITHROMAX) 250 mg tablet       bisacodyl (bisacodyl) 5 mg EC tablet Take 5 mg by mouth daily as needed for constipation      !! carvedilol (COREG) 3.125 mg tablet       !! carvedilol (COREG) 6.25 mg tablet Take 6.25 mg by mouth in the morning      clindamycin (CLEOCIN) 300 MG capsule Take 300 mg by mouth every 8 (eight) hours      clotrimazole (LOTRIMIN) 1 % cream Apply topically 2 (two) times a day  Qty: 30 g,  Refills: 0    Associated Diagnoses: Vaginal itching      colchicine (COLCRYS) 0.6 mg tablet       dextromethorphan-guaiFENesin (ROBITUSSIN DM)  mg/5 mL syrup Take 10 mL by mouth every 4 (four) hours as needed for cough or congestion  Refills: 0    Associated Diagnoses: COVID      Diclofenac Sodium (VOLTAREN) 1 % Apply 2 g topically 4 (four) times a day  Qty: 4 g, Refills: 0    Associated Diagnoses: Right shoulder pain      docusate sodium (COLACE) 100 mg capsule Take 100 mg by mouth 2 (two) times a day      estradiol (ESTRACE) 0.1 mg/g vaginal cream Not taking currently but will 4/4/24      famotidine (PEPCID) 40 MG tablet Take 40 mg by mouth daily      febuxostat (ULORIC) 40 mg tablet       !! furosemide (LASIX) 20 mg tablet Take 20 mg by mouth in the morning      !! furosemide (LASIX) 40 mg tablet       lidocaine (Lidoderm) 5 % Apply 1 patch topically over 12 hours daily Remove & Discard patch within 12 hours or as directed by MD  Qty: 15 patch, Refills: 0    Associated Diagnoses: Right shoulder pain      montelukast (SINGULAIR) 10 mg tablet       nitrofurantoin (MACROBID) 100 mg capsule       ondansetron (ZOFRAN) 4 mg tablet       ondansetron (ZOFRAN-ODT) 4 mg disintegrating tablet Take 1 tablet (4 mg total) by mouth every 6 (six) hours as needed for nausea for up to 3 days  Qty: 9 tablet, Refills: 0    Associated Diagnoses: Liver lesion; Gastritis      pantoprazole (PROTONIX) 20 mg tablet Take 1 tablet (20 mg total) by mouth daily  Qty: 20 tablet, Refills: 0    Associated Diagnoses: Liver lesion; Gastritis      risperiDONE (RisperDAL) 0.25 mg tablet Take 0.25 mg by mouth daily      sertraline (ZOLOFT) 25 mg tablet Take 25 mg by mouth daily      vancomycin (VANCOCIN) 125 MG capsule        !! - Potential duplicate medications found. Please discuss with provider.          No discharge procedures on file.    PDMP Review       None            ED Provider  Electronically Signed by             Edgar MCGHEE  DO Trip  07/26/24 0106

## 2024-07-22 NOTE — ASSESSMENT & PLAN NOTE
Likely reactive due to acute medical issue(s) occluding a degree of hemoconcentration from hypercalcemia requiring IV fluids  Monitor WBC count

## 2024-07-22 NOTE — ASSESSMENT & PLAN NOTE
As evidenced by calcium 13.5 and ionized 1.69  Likely secondary to worsening malignancy as evidenced by findings on CT abdomen/pelvis which showed worsening malignancy and considerably worsening metastatic disease compared to CT abdomen/pelvis of 1/13/2024.  ED gave calcitonin  units  Oncology consult  Nephrology consulted for hypercalcemia management           Patient to and from CT with RT and this RN. Patient tolerated procedure well. Continuously monitored vitals via portable monitor during transport.

## 2024-07-22 NOTE — H&P
[facility]  Progress Note  Name: Kristen Maguire I  MRN: [id number]  Unit/Bed#: ED 09 I Date of Admission: 7/22/2024   Date of Service: 7/22/2024 I Hospital Day: 0    Assessment & Plan   Leukocytosis  Assessment & Plan  Admission WBC of 13.84 likely secondary to disease process  Monitor CBC with diff             VTE Pharmacologic Prophylaxis:   High Risk (Score >/= 5) - Pharmacological DVT Prophylaxis Ordered: apixaban (Eliquis). Sequential Compression Devices Ordered.  Code Status: Full    Discussion with family: Attempted to update  (daughter) via phone. Left voicemail.     Anticipated Length of Stay: Patient will be admitted on an inpatient basis with an anticipated length of stay of greater than 2 midnights secondary to hypocalcemia, oncology and nephrology consult.    Total Time Spent on Date of Encounter in care of patient:  mins. This time was spent on one or more of the following: performing physical exam; counseling and coordination of care; obtaining or reviewing history; documenting in the medical record; reviewing/ordering tests, medications or procedures; communicating with other healthcare professionals and discussing with patient's family/caregivers.    Chief Complaint: Generalized weakness    History of Present Illness:  Kristen Maguire is a 88 y.o. female with a PMH of of GERD, asthma, atrial fibrillation on Eliquis, metastatic pancreatic cancer who presents with generalized weakness and vomiting for a few days.  CT abdomen/pelvis revealed worsening malignancy with mets.  Lab work revealed calcium of 13.5 and ionized calcium 1.69.  ED gave calcitonin 380 units IM and 1 L bolus NSS.  Will admit to medicine service on full CODE STATUS with consultation to nephrology and oncology.    Review of Systems:  Review of Systems   Constitutional:  Positive for appetite change.   Gastrointestinal:  Positive for abdominal pain and vomiting.   Neurological:  Positive for  weakness.       Past Medical and Surgical History:   Past Medical History:   Diagnosis Date    Arthritis     Cardiac disease     Gout     Liver cancer (HCC)     Lyme disease     Pancreatic cancer (HCC)        Past Surgical History:   Procedure Laterality Date    CARDIAC PACEMAKER PLACEMENT      CHOLECYSTECTOMY      HYSTERECTOMY      JOINT REPLACEMENT         Meds/Allergies:  Prior to Admission medications    Medication Sig Start Date End Date Taking? Authorizing Provider   azithromycin (ZITHROMAX) 250 mg tablet  7/8/24  Yes Historical Provider, MD   clindamycin (CLEOCIN) 300 MG capsule Take 300 mg by mouth every 8 (eight) hours 6/17/24  Yes Historical Provider, MD   ondansetron (ZOFRAN) 4 mg tablet  6/7/24  Yes Historical Provider, MD   vancomycin (VANCOCIN) 125 MG capsule  6/13/24  Yes Historical Provider, MD   acetaminophen (TYLENOL) 325 mg tablet Take 650 mg by mouth every 6 (six) hours as needed for mild pain  Patient not taking: Reported on 4/4/2024    Historical Provider, MD   albuterol (PROVENTIL HFA,VENTOLIN HFA) 90 mcg/act inhaler  2/8/23   Historical Provider, MD   allopurinol (ZYLOPRIM) 100 mg tablet  3/19/24   Historical Provider, MD   Apixaban (ELIQUIS PO) Take 5 mg by mouth 2 (two) times a day    Historical Provider, MD   bisacodyl (bisacodyl) 5 mg EC tablet Take 5 mg by mouth daily as needed for constipation    Historical Provider, MD   carvedilol (COREG) 3.125 mg tablet  2/20/24   Historical Provider, MD   carvedilol (COREG) 6.25 mg tablet Take 6.25 mg by mouth in the morning  Patient not taking: Reported on 4/4/2024    Historical Provider, MD   clotrimazole (LOTRIMIN) 1 % cream Apply topically 2 (two) times a day  Patient not taking: Reported on 1/13/2024 3/8/23   Radha Gibbs MD   colchicine (COLCRYS) 0.6 mg tablet  1/4/24   Historical Provider, MD   dextromethorphan-guaiFENesin (ROBITUSSIN DM)  mg/5 mL syrup Take 10 mL by mouth every 4 (four) hours as needed for cough or  congestion  Patient not taking: Reported on 1/13/2024 10/30/23   Milady Wiggins MD   Diclofenac Sodium (VOLTAREN) 1 % Apply 2 g topically 4 (four) times a day 2/7/24   Wesly Katz MD   docusate sodium (COLACE) 100 mg capsule Take 100 mg by mouth 2 (two) times a day    Historical Provider, MD   estradiol (ESTRACE) 0.1 mg/g vaginal cream Not taking currently but will 4/4/24 2/22/24   Historical Provider, MD   famotidine (PEPCID) 40 MG tablet Take 40 mg by mouth daily    Historical Provider, MD   febuxostat (ULORIC) 40 mg tablet  2/22/24   Historical Provider, MD   furosemide (LASIX) 20 mg tablet Take 20 mg by mouth in the morning  Patient not taking: Reported on 4/4/2024    Historical Provider, MD   furosemide (LASIX) 40 mg tablet  2/8/24   Historical Provider, MD   lidocaine (Lidoderm) 5 % Apply 1 patch topically over 12 hours daily Remove & Discard patch within 12 hours or as directed by MD  Patient not taking: Reported on 4/4/2024 2/7/24   Wesly Katz MD   montelukast (SINGULAIR) 10 mg tablet  3/7/23   Historical Provider, MD   nitrofurantoin (MACROBID) 100 mg capsule  2/1/24   Historical Provider, MD   ondansetron (ZOFRAN-ODT) 4 mg disintegrating tablet Take 1 tablet (4 mg total) by mouth every 6 (six) hours as needed for nausea for up to 3 days  Patient not taking: Reported on 4/4/2024 1/13/24 1/16/24  Larissa Lagunas DO   pantoprazole (PROTONIX) 20 mg tablet Take 1 tablet (20 mg total) by mouth daily  Patient not taking: Reported on 4/4/2024 1/13/24   Larissa Lagunas DO   risperiDONE (RisperDAL) 0.25 mg tablet Take 0.25 mg by mouth daily    Historical Provider, MD   sertraline (ZOLOFT) 25 mg tablet Take 25 mg by mouth daily    Historical Provider, MD SKY have reviewed home medications with patient personally.    Allergies:   Allergies   Allergen Reactions    Aspirin Other (See Comments)    Chocolate - Food Allergy Other (See Comments)     constipation    Ciprofloxacin Hives    Doxycycline Hives     Iodinated Contrast Media Other (See Comments)    Peanut Oil - Food Allergy Hives    Penicillins Hives    Soy Allergy - Food Allergy Other (See Comments)    Soybean Oil - Food Allergy Nausea Only    Erythromycin Base Rash       Social History:  Marital Status:    Occupation:   Patient Pre-hospital Living Situation:   Patient Pre-hospital Level of Mobility:   Patient Pre-hospital Diet Restrictions:   Substance Use History:   Social History     Substance and Sexual Activity   Alcohol Use Never     Social History     Tobacco Use   Smoking Status Never   Smokeless Tobacco Never     Social History     Substance and Sexual Activity   Drug Use No       Family History:  History reviewed. No pertinent family history.    Physical Exam:     Vitals:   Blood Pressure: 137/60 (07/22/24 1715)  Pulse: 60 (07/22/24 1715)  Temperature: 98.1 °F (36.7 °C) (07/22/24 1143)  Temp Source: Oral (07/22/24 1142)  Respirations: 20 (07/22/24 1645)  SpO2: 94 % (07/22/24 1715)    Physical Exam  Vitals and nursing note reviewed.   Constitutional:       General: She is not in acute distress.     Appearance: She is well-developed.   HENT:      Head: Normocephalic.   Eyes:      Conjunctiva/sclera: Conjunctivae normal.   Cardiovascular:      Rate and Rhythm: Normal rate.      Heart sounds: No murmur heard.  Pulmonary:      Effort: Pulmonary effort is normal. No respiratory distress.      Breath sounds: Normal breath sounds.   Abdominal:      Tenderness: There is abdominal tenderness.   Musculoskeletal:         General: No swelling.      Cervical back: Normal range of motion.   Skin:     General: Skin is warm and dry.      Capillary Refill: Capillary refill takes less than 2 seconds.   Neurological:      Mental Status: She is alert. Mental status is at baseline.   Psychiatric:         Behavior: Behavior normal.          Additional Data:     Lab Results:  Results from last 7 days   Lab Units 07/22/24  1212   WBC Thousand/uL 13.84*   HEMOGLOBIN  "g/dL 11.0*   HEMATOCRIT % 36.0   PLATELETS Thousands/uL 498*   SEGS PCT % 78*   LYMPHO PCT % 13*   MONO PCT % 8   EOS PCT % 1     Results from last 7 days   Lab Units 07/22/24  1212   SODIUM mmol/L 133*   POTASSIUM mmol/L 3.6   CHLORIDE mmol/L 98   CO2 mmol/L 27   BUN mg/dL 19   CREATININE mg/dL 0.89   ANION GAP mmol/L 8   CALCIUM mg/dL 13.5*   ALBUMIN g/dL 3.5   TOTAL BILIRUBIN mg/dL 0.55   ALK PHOS U/L 68   ALT U/L 7   AST U/L 14   GLUCOSE RANDOM mg/dL 85             No results found for: \"HGBA1C\"        Lines/Drains:  Invasive Devices       Peripheral Intravenous Line  Duration             Peripheral IV 04/12/24 Right Antecubital 101 days    Peripheral IV 07/22/24 Right;Upper;Ventral (anterior) Arm <1 day                        Imaging: Reviewed radiology reports from this admission including: chest xray, abdominal/pelvic CT, and CT head  CT abdomen pelvis wo contrast   Final Result by Max Kaye MD (07/22 1607)      Fluid-filled distal esophagus.   The stomach is unremarkable without hernia.   No evidence of obstruction.      Stable appearance of an anterior pelvic abdominal wall fat-containing hernia with similar infiltration of the involved herniated fat.   Clinical correlation advised for focal pain and reducibility.      Enlarging central left hepatic lesion, not well evaluated on this noncontrast exam.   Interval decrease in size of a exophytic irregular lesion from the right kidney upper pole, possibly representing an involuting cyst component.   Grossly stable cystic pancreatic lesions as described, with large ill-defined lesion in the pancreatic head.      New enlarged abdominal nodes as described, measuring up to 3 cm.   New 5 mm left lower lobe pulmonary nodule.      Overall the above findings are consistent with worsening malignancy and considerably worsening metastatic disease.   Limit evaluation on this noncontrast exam.   Postcontrast MRI versus CT recommended for further evaluation.    "      The study was marked in EPIC for immediate notification.      Workstation performed: [id number]         XR chest 1 view   Final Result by Devyn Scanlon MD (07/22 2169)      Prominent left paramediastinal density which may be accentuated due to positioning. Known left thyroid goiter. Follow-up with be recommended.      The study was marked in EPIC for immediate notification.      Workstation performed: [id number]         CT head without contrast   Final Result by Laci Miller MD (07/22 1400)      No acute intracranial abnormality.                  Workstation performed: [id number]             EKG and Other Studies Reviewed on Admission:   EKG: Paced rhythm. HR 60.    ** Please Note: This note has been constructed using a voice recognition system. **

## 2024-07-22 NOTE — ASSESSMENT & PLAN NOTE
CT abdomen/pelvis demonstrated large colonic stool burden.  Small bowel unremarkable without evidence of obstruction  Will start bowel regimen of Colace, MiraLAX and Senokot  Will give Dulcolax rectal x 1 dose and as needed  Monitor for results

## 2024-07-22 NOTE — ASSESSMENT & PLAN NOTE
Presented to the ED with complaints of a few days of vomiting and poor oral intake. Reports abdominal pain and generalized weakness also for a few days.  Reports of more confusion than usual  Chest x-ray showed pulm left paramediastinal density which will be accentuated due to positioning and known left thyroid goiter  CT abdomen/pelvis without contrast demonstrated worsening malignancy and considerably worsening metastatic disease compared to CT abdomen/pelvis of 1/13/2024.  CT head without contrast with no evidence of acute intracranial abnormality  Low level ammonia at 15  ED gave 1 L bolus NSS  Will continue gentle IV hydration at 50 cc/h  Follow-up on UA with reflex  PT/OT eval and treat  Fall precautions  Supportive care

## 2024-07-22 NOTE — Clinical Note
Case was discussed with and the patient's admission status was agreed to be Admission Status: inpatient status to the service of Dr. Jesus

## 2024-07-23 PROBLEM — E87.1 HYPONATREMIA: Status: ACTIVE | Noted: 2024-07-23

## 2024-07-23 PROBLEM — E87.6 HYPOKALEMIA: Status: ACTIVE | Noted: 2024-07-23

## 2024-07-23 PROBLEM — K86.9 PANCREATIC LESION: Status: ACTIVE | Noted: 2024-07-23

## 2024-07-23 PROBLEM — Z87.39 HISTORY OF GOUT: Status: ACTIVE | Noted: 2024-07-23

## 2024-07-23 PROBLEM — R93.5 ABNORMAL CT OF THE ABDOMEN: Status: ACTIVE | Noted: 2024-07-23

## 2024-07-23 LAB
ALBUMIN SERPL BCG-MCNC: 3 G/DL (ref 3.5–5)
ALP SERPL-CCNC: 55 U/L (ref 34–104)
ALT SERPL W P-5'-P-CCNC: 4 U/L (ref 7–52)
ANION GAP SERPL CALCULATED.3IONS-SCNC: 7 MMOL/L (ref 4–13)
AST SERPL W P-5'-P-CCNC: 12 U/L (ref 13–39)
BASOPHILS # BLD AUTO: 0.07 THOUSANDS/ÂΜL (ref 0–0.1)
BASOPHILS NFR BLD AUTO: 1 % (ref 0–1)
BILIRUB SERPL-MCNC: 0.46 MG/DL (ref 0.2–1)
BUN SERPL-MCNC: 17 MG/DL (ref 5–25)
CALCIUM ALBUM COR SERPL-MCNC: 12.4 MG/DL (ref 8.3–10.1)
CALCIUM SERPL-MCNC: 11.6 MG/DL (ref 8.4–10.2)
CHLORIDE SERPL-SCNC: 100 MMOL/L (ref 96–108)
CO2 SERPL-SCNC: 24 MMOL/L (ref 21–32)
CREAT SERPL-MCNC: 0.78 MG/DL (ref 0.6–1.3)
EOSINOPHIL # BLD AUTO: 0.22 THOUSAND/ÂΜL (ref 0–0.61)
EOSINOPHIL NFR BLD AUTO: 2 % (ref 0–6)
ERYTHROCYTE [DISTWIDTH] IN BLOOD BY AUTOMATED COUNT: 17.5 % (ref 11.6–15.1)
GFR SERPL CREATININE-BSD FRML MDRD: 68 ML/MIN/1.73SQ M
GLUCOSE SERPL-MCNC: 102 MG/DL (ref 65–140)
HCT VFR BLD AUTO: 31.8 % (ref 34.8–46.1)
HGB BLD-MCNC: 9.6 G/DL (ref 11.5–15.4)
IMM GRANULOCYTES # BLD AUTO: 0.05 THOUSAND/UL (ref 0–0.2)
IMM GRANULOCYTES NFR BLD AUTO: 0 % (ref 0–2)
LYMPHOCYTES # BLD AUTO: 1.83 THOUSANDS/ÂΜL (ref 0.6–4.47)
LYMPHOCYTES NFR BLD AUTO: 13 % (ref 14–44)
MAGNESIUM SERPL-MCNC: 2 MG/DL (ref 1.9–2.7)
MCH RBC QN AUTO: 24.5 PG (ref 26.8–34.3)
MCHC RBC AUTO-ENTMCNC: 30.2 G/DL (ref 31.4–37.4)
MCV RBC AUTO: 81 FL (ref 82–98)
MONOCYTES # BLD AUTO: 1.22 THOUSAND/ÂΜL (ref 0.17–1.22)
MONOCYTES NFR BLD AUTO: 9 % (ref 4–12)
NEUTROPHILS # BLD AUTO: 10.43 THOUSANDS/ÂΜL (ref 1.85–7.62)
NEUTS SEG NFR BLD AUTO: 75 % (ref 43–75)
NRBC BLD AUTO-RTO: 0 /100 WBCS
PLATELET # BLD AUTO: 460 THOUSANDS/UL (ref 149–390)
PMV BLD AUTO: 9.4 FL (ref 8.9–12.7)
POTASSIUM SERPL-SCNC: 3.2 MMOL/L (ref 3.5–5.3)
PROT SERPL-MCNC: 6.1 G/DL (ref 6.4–8.4)
RBC # BLD AUTO: 3.92 MILLION/UL (ref 3.81–5.12)
SODIUM SERPL-SCNC: 131 MMOL/L (ref 135–147)
WBC # BLD AUTO: 13.82 THOUSAND/UL (ref 4.31–10.16)

## 2024-07-23 PROCEDURE — 99223 1ST HOSP IP/OBS HIGH 75: CPT | Performed by: PHYSICIAN ASSISTANT

## 2024-07-23 PROCEDURE — 85025 COMPLETE CBC W/AUTO DIFF WBC: CPT

## 2024-07-23 PROCEDURE — 97110 THERAPEUTIC EXERCISES: CPT

## 2024-07-23 PROCEDURE — 97167 OT EVAL HIGH COMPLEX 60 MIN: CPT

## 2024-07-23 PROCEDURE — 97535 SELF CARE MNGMENT TRAINING: CPT

## 2024-07-23 PROCEDURE — 80053 COMPREHEN METABOLIC PANEL: CPT

## 2024-07-23 PROCEDURE — 86301 IMMUNOASSAY TUMOR CA 19-9: CPT | Performed by: INTERNAL MEDICINE

## 2024-07-23 PROCEDURE — 99232 SBSQ HOSP IP/OBS MODERATE 35: CPT | Performed by: INTERNAL MEDICINE

## 2024-07-23 PROCEDURE — 99223 1ST HOSP IP/OBS HIGH 75: CPT | Performed by: STUDENT IN AN ORGANIZED HEALTH CARE EDUCATION/TRAINING PROGRAM

## 2024-07-23 PROCEDURE — 83735 ASSAY OF MAGNESIUM: CPT

## 2024-07-23 PROCEDURE — 97163 PT EVAL HIGH COMPLEX 45 MIN: CPT

## 2024-07-23 RX ORDER — BUMETANIDE 0.25 MG/ML
1 INJECTION INTRAMUSCULAR; INTRAVENOUS ONCE
Status: COMPLETED | OUTPATIENT
Start: 2024-07-23 | End: 2024-07-23

## 2024-07-23 RX ORDER — POTASSIUM CHLORIDE 20MEQ/15ML
40 LIQUID (ML) ORAL ONCE
Status: COMPLETED | OUTPATIENT
Start: 2024-07-23 | End: 2024-07-23

## 2024-07-23 RX ORDER — CALCITONIN SALMON 200 [USP'U]/ML
100 INJECTION, SOLUTION INTRAMUSCULAR; SUBCUTANEOUS ONCE
Status: COMPLETED | OUTPATIENT
Start: 2024-07-23 | End: 2024-07-23

## 2024-07-23 RX ORDER — POTASSIUM CHLORIDE 20 MEQ/1
40 TABLET, EXTENDED RELEASE ORAL ONCE
Status: COMPLETED | OUTPATIENT
Start: 2024-07-23 | End: 2024-07-23

## 2024-07-23 RX ADMIN — SENNOSIDES 8.6 MG: 8.6 TABLET, FILM COATED ORAL at 08:31

## 2024-07-23 RX ADMIN — APIXABAN 5 MG: 5 TABLET, FILM COATED ORAL at 08:33

## 2024-07-23 RX ADMIN — DOCUSATE SODIUM 100 MG: 100 CAPSULE, LIQUID FILLED ORAL at 18:58

## 2024-07-23 RX ADMIN — CARVEDILOL 3.12 MG: 3.12 TABLET, FILM COATED ORAL at 16:18

## 2024-07-23 RX ADMIN — RISPERIDONE 0.25 MG: 0.25 TABLET, FILM COATED ORAL at 08:33

## 2024-07-23 RX ADMIN — CALCITONIN SALMON 100 UNITS: 200 INJECTION, SOLUTION INTRAMUSCULAR; SUBCUTANEOUS at 10:11

## 2024-07-23 RX ADMIN — ONDANSETRON 4 MG: 2 INJECTION INTRAMUSCULAR; INTRAVENOUS at 05:07

## 2024-07-23 RX ADMIN — MONTELUKAST 10 MG: 10 TABLET, FILM COATED ORAL at 18:58

## 2024-07-23 RX ADMIN — SODIUM CHLORIDE 125 ML/HR: 0.9 INJECTION, SOLUTION INTRAVENOUS at 16:03

## 2024-07-23 RX ADMIN — POLYETHYLENE GLYCOL 3350 17 G: 17 POWDER, FOR SOLUTION ORAL at 09:08

## 2024-07-23 RX ADMIN — DOCUSATE SODIUM 100 MG: 100 CAPSULE, LIQUID FILLED ORAL at 08:30

## 2024-07-23 RX ADMIN — FAMOTIDINE 40 MG: 20 TABLET, FILM COATED ORAL at 08:33

## 2024-07-23 RX ADMIN — APIXABAN 5 MG: 5 TABLET, FILM COATED ORAL at 20:49

## 2024-07-23 RX ADMIN — SERTRALINE HYDROCHLORIDE 25 MG: 25 TABLET ORAL at 08:31

## 2024-07-23 RX ADMIN — BUMETANIDE 1 MG: 0.25 INJECTION INTRAMUSCULAR; INTRAVENOUS at 08:59

## 2024-07-23 RX ADMIN — BISACODYL 10 MG: 10 SUPPOSITORY RECTAL at 09:19

## 2024-07-23 RX ADMIN — COLCHICINE 0.6 MG: 0.6 TABLET ORAL at 08:31

## 2024-07-23 RX ADMIN — POTASSIUM CHLORIDE 40 MEQ: 20 SOLUTION ORAL at 12:45

## 2024-07-23 RX ADMIN — POTASSIUM CHLORIDE 40 MEQ: 1500 TABLET, EXTENDED RELEASE ORAL at 06:33

## 2024-07-23 RX ADMIN — CARVEDILOL 3.12 MG: 3.12 TABLET, FILM COATED ORAL at 08:31

## 2024-07-23 RX ADMIN — ALLOPURINOL 100 MG: 100 TABLET ORAL at 08:31

## 2024-07-23 NOTE — CASE MANAGEMENT
Case Management Assessment & Discharge Planning Note    Patient name Kristen Maguire  Location [address] [place] [address]/[address] [place] 411-* MRN [id number]  : 1935 Date 2024       Current Admission Date: 2024  Current Admission Diagnosis:Generalized weakness   Patient Active Problem List    Diagnosis Date Noted Date Diagnosed    Hypercalcemia 2024     Other constipation 2024     Dysphagia 2024     Generalized weakness 10/26/2023     COVID-19 virus infection 10/26/2023     Leukocytosis 10/26/2023     Thrombocytosis 10/26/2023     Atrial fibrillation (HCC) 10/26/2023     GERD (gastroesophageal reflux disease) 10/26/2023     Psychiatric disorder 10/26/2023     History of asthma 10/26/2023       LOS (days): 1  Geometric Mean LOS (GMLOS) (days): 2.6  Days to GMLOS:1.7     OBJECTIVE:    Risk of Unplanned Readmission Score: 23.15         Current admission status: Inpatient       [facility]:   [facility] [place] [id number] [address]   [facility] [address]  Phone: [phone] Fax: [phone]    Primary Care Provider: John Nguyen DO    Primary Insurance: MEDICARE  Secondary Insurance: [facility]    ASSESSMENT:  Active Health Care Proxies    There are no active Health Care Proxies on file.                 Readmission Root Cause  30 Day Readmission: No    Patient Information  Admitted from:: Home  Mental Status: Alert  During Assessment patient was accompanied by: Daughter  Assessment information provided by:: Patient  Primary Caregiver: Other (Comment)  Caregiver's Name:: Clari Ramos (private caregiver)  Caregiver's Relationship to Patient:: Other (Specify)  Caregiver's Telephone Number:: [phone]  Support Systems: Private Caregivers  County of Residence: [place]  What city do you live in[place]  Home entry access options. Select all that apply.: Stairs  Number of steps to enter home.: 6  Do the steps have railings?: No  Type of  Current Residence: [facility]  Living Arrangements: Lives Alone (Has private caregiver from 5pm-9pm and 9pm through the next day.)  Is patient a ?: No    Activities of Daily Living Prior to Admission  Functional Status: Independent  Completes ADLs independently?: No  Level of ADL dependence: Assistance  Ambulates independently?: No  Level of ambulatory dependence: Assistance  Does patient use assisted devices?: Yes  Assisted Devices (DME) used: Rollator, Shower Chair  Does patient currently own DME?: Yes  What DME does the patient currently own?: Rollator, Shower Chair  Does patient have a history of Outpatient Therapy (PT/OT)?: No  Does the patient have a history of Short-Term Rehab?: Yes  Does patient have a history of HHC?: Yes  Does patient currently have HHC?: Yes    Current Home Health Care  Type of Current Home Care Services: Home health aide  Current Home Health Agency:: Other (please enter name in comment) (Private pay caregiver.)  Current Home Health Follow-Up Provider:: PCP    Patient Information Continued  Income Source: SSI/SSD  Does patient have prescription coverage?: Yes  Does patient receive dialysis treatments?: No  Does patient have a history of substance abuse?: No  Does patient have a history of Mental Health Diagnosis?: No         Means of Transportation  Means of Transport to Appts:: Other (Comment) (Caregiver)      Social Determinants of Health (SDOH)      Flowsheet Row Most Recent Value   Housing Stability    In the last 12 months, was there a time when you were not able to pay the mortgage or rent on time? N   In the past 12 months, how many times have you moved where you were living? 0   At any time in the past 12 months, were you homeless or living in a shelter (including now)? N   Transportation Needs    In the past 12 months, has lack of transportation kept you from medical appointments or from getting medications? no   In the past 12 months, has lack of transportation kept you from  meetings, work, or from getting things needed for daily living? No   Food Insecurity    Within the past 12 months, you worried that your food would run out before you got the money to buy more. Never true   Within the past 12 months, the food you bought just didn't last and you didn't have money to get more. Never true   Utilities    In the past 12 months has the electric, gas, oil, or water company threatened to shut off services in your home? No            DISCHARGE DETAILS:    Discharge planning discussed with:: Patient and pt's daughter at bedside.  Freedom of Choice: Yes  Comments - Freedom of Choice: STR rec. Pt agreeable to blanket STR referral being made.  CM contacted family/caregiver?: Yes  Were Treatment Team discharge recommendations reviewed with patient/caregiver?: Yes  Did patient/caregiver verbalize understanding of patient care needs?: Yes  Were patient/caregiver advised of the risks associated with not following Treatment Team discharge recommendations?: Yes    Contacts  Patient Contacts: Yomaira-daughter  Relationship to Patient:: Family  Contact Method: In Person  Reason/Outcome: Continuity of Care, Discharge Planning, Emergency Contact    Requested Home Health Care         Is the patient interested in HHC at discharge?: No    DME Referral Provided  Referral made for DME?: No    Other Referral/Resources/Interventions Provided:  Interventions: Short Term Rehab  Referral Comments: [place] STR referral sent at this time.         Treatment Team Recommendation: Short Term Rehab  Discharge Destination Plan:: Short Term Rehab     Additional Comments: CM met with pt and pt's daughter at bedside and introduced self and role. Pt reports she resides alone but as private caregivers who are with her throughout the night. Pt was unclear as to how many hours her caregivers are with her, but reported someone is always with her. Pt reports hx of STR and HHC. Pt denied hx of MH and substance abuse. Pt has hospital  bed, walker, and rollator at home for DME. CM reviewed STR rec with pt and pt's daughter who were both agreeable to blanket STR referral being made. [place] STR referral made at this time. CM department to continue to follow for discharge planning needs throughout this admission.

## 2024-07-23 NOTE — PLAN OF CARE
Problem: Prexisting or High Potential for Compromised Skin Integrity  Goal: Skin integrity is maintained or improved  Description: INTERVENTIONS:  - Identify patients at risk for skin breakdown  - Assess and monitor skin integrity  - Assess and monitor nutrition and hydration status  - Monitor labs   - Assess for incontinence   - Turn and reposition patient  - Assist with mobility/ambulation  - Relieve pressure over bony prominences  - Avoid friction and shearing  - Provide appropriate hygiene as needed including keeping skin clean and dry  - Evaluate need for skin moisturizer/barrier cream  - Collaborate with interdisciplinary team   - Patient/family teaching  - Consider wound care consult   Outcome: Progressing     Problem: GASTROINTESTINAL - ADULT  Goal: Minimal or absence of nausea and/or vomiting  Description: INTERVENTIONS:  - Administer IV fluids if ordered to ensure adequate hydration  - Maintain NPO status until nausea and vomiting are resolved  - Nasogastric tube if ordered  - Administer ordered antiemetic medications as needed  - Provide nonpharmacologic comfort measures as appropriate  - Advance diet as tolerated, if ordered  - Consider nutrition services referral to assist patient with adequate nutrition and appropriate food choices  Outcome: Progressing  Goal: Maintains or returns to baseline bowel function  Description: INTERVENTIONS:  - Assess bowel function  - Encourage oral fluids to ensure adequate hydration  - Administer IV fluids if ordered to ensure adequate hydration  - Administer ordered medications as needed  - Encourage mobilization and activity  - Consider nutritional services referral to assist patient with adequate nutrition and appropriate food choices  Outcome: Progressing  Goal: Maintains adequate nutritional intake  Description: INTERVENTIONS:  - Monitor percentage of each meal consumed  - Identify factors contributing to decreased intake, treat as appropriate  - Assist with meals  as needed  - Monitor I&O, weight, and lab values if indicated  - Obtain nutrition services referral as needed  Outcome: Progressing  Goal: Establish and maintain optimal ostomy function  Description: INTERVENTIONS:  - Assess bowel function  - Encourage oral fluids to ensure adequate hydration  - Administer IV fluids if ordered to ensure adequate hydration   - Administer ordered medications as needed  - Encourage mobilization and activity  - Nutrition services referral to assist patient with appropriate food choices  - Assess stoma site  - Consider wound care consult   Outcome: Progressing  Goal: Oral mucous membranes remain intact  Description: INTERVENTIONS  - Assess oral mucosa and hygiene practices  - Implement preventative oral hygiene regimen  - Implement oral medicated treatments as ordered  - Initiate Nutrition services referral as needed  Outcome: Progressing

## 2024-07-23 NOTE — PROGRESS NOTES
Critical access hospital  Progress Note  Name: Kristen Maguire I  MRN: [id number]  Unit/Bed#: 4 [place] 411-01  Date of Admission: 7/22/2024   Date of Service: 7/23/2024 I Hospital Day: 1      Assessment & Plan:    * Generalized weakness  Assessment & Plan  7/22 - presented to the ED with complaints of a few days of vomiting and poor oral intake. Reports abdominal pain and generalized weakness also for a few days.  Reports of more confusion than usual  Chest x-ray showed pulm left paramediastinal density which will be accentuated due to positioning and known left thyroid goiter  CT abdomen/pelvis without contrast demonstrated worsening malignancy and considerably worsening metastatic disease compared to CT abdomen/pelvis of 1/13/2024.  CT head without contrast with no evidence of acute intracranial abnormality  Low level ammonia at 15  ED gave 1 L bolus NSS  Will continue gentle IV hydration at 50 cc/h  Follow-up on UA with reflex  PT/OT eval and treat  Fall precautions  Supportive care    7/23 - reports mild improvement in fatigue with onset of treatment for hypercalcemia (see below).  Initial PT/OT evaluation consistent with possible need for skilled rehab.    Hypercalcemia  Assessment & Plan  As evidenced by calcium 13.5 and ionized 1.69 on admission -> serum calcium of 11.6 today, elevated at 12.4 when corrected  Likely secondary to worsening malignancy as evidenced by findings on CT abdomen/pelvis which showed worsening malignancy and considerably worsening metastatic disease compared to CT abdomen/pelvis of 1/13/2024.  Appreciate nephrology input -> continue IV fluids with ongoing calcitonin trial and a dose of IV diuretic (Bumex)  Monitor serum and ionized calcium daily  See plan for abnormal CT below    Leukocytosis  Assessment & Plan  Likely reactive due to acute medical issue(s) occluding a degree of hemoconcentration from hypercalcemia requiring IV fluids  Monitor WBC count    Abnormal CT of the  abdomen  Assessment & Plan  CT imaging with evidence of a prior pelvic hernia, pancreatic head mass with left hepatic lesion, and a 5 mm left lower lobe pulmonary nodule  Check CA 19-9 tumor marker  Oncology following    Thrombocytosis  Assessment & Plan  Likely reactive  Monitor platelet count    Atrial fibrillation (HCC)  Assessment & Plan  Status post pacemaker  On Coreg for rate control  On Eliquis for anticoagulation    GERD (gastroesophageal reflux disease)  Assessment & Plan  Continue Pepcid    Psychiatric disorder  Assessment & Plan  Continue Risperdal/Zoloft    History of gout  Assessment & Plan  Continue Allopurinol/Colchicine    Constipation  Assessment & Plan  CT abdomen/pelvis demonstrated large colonic stool burden.  Small bowel unremarkable without evidence of obstruction.  Optimize bowel regimen as necessary    Hypokalemia  Assessment & Plan  Monitor/replete serum potassium and magnesium as necessary    Hyponatremia  Assessment & Plan  Serum sodium currently at 131  Likely secondary to decreased oral intake -> on IV fluids  Monitor serum sodium      DVT Prophylaxis:  Eliquis       AM-PAC Basic Mobility:  Basic Mobility Inpatient Raw Score: 11  JH-HLM Achieved: 6: Walk 10 steps or more  -HLM Goal: 4: Move to chair/commode    Patient Centered Rounds:  I have performed bedside rounds and discussed plan of care with nursing today.  Discussions with Specialists or Other Care Team Provider:  see above assessments if applicable    Education and Discussions with Family / Patient:  Patient at bedside    Time Spent for Care:  35 minutes. More than 50% of total time spent on counseling and coordination of care, on one or more of the following: performing physical exam; counseling and coordination of care, obtaining or reviewing history, documenting in the medical record, reviewing/ordering tests/medications/procedures, and communicating with other healthcare professionals.    Current Length of Stay: 1  "day(s)  Current Patient Status: Inpatient   Certification Statement:  Patient will continue to require additional hospital stay due to assessments as noted above.    Code Status: Level 1 - Full Code        Subjective:     Encountered earlier today.  Sitting upright in the chair.  Still reports some weakness and fatigue however, states she feels a bit \"better\", when compared to presentation.        Objective:     Vitals:   Temp (24hrs), Av.8 °F (36.6 °C), Min:97.2 °F (36.2 °C), Max:98.8 °F (37.1 °C)    Temp:  [97.2 °F (36.2 °C)-98.8 °F (37.1 °C)] 98.1 °F (36.7 °C)  HR:  [60-62] 60  Resp:  [16-20] 18  BP: (110-135)/(44-66) 120/54  SpO2:  [90 %-96 %] 94 %  Body mass index is 36.15 kg/m².     Input and Output Summary (last 24 hours):       Intake/Output Summary (Last 24 hours) at 2024 1754  Last data filed at 2024 1603  Gross per 24 hour   Intake 1673.75 ml   Output --   Net 1673.75 ml       Physical Exam:     GENERAL Weak/fatigued   HEAD   Normocephalic - atraumatic   EYES Nonicteric   MOUTH   Mucosa moist   NECK   Supple - full range of motion   CARDIAC Rate controlled   PULMONARY    Clear breath sounds bilaterally - nonlabored respirations   ABDOMEN Currently nontender/nondistended   MUSCULOSKELETAL   Motor strength/range of motion deconditioned   NEUROLOGIC   Alert/oriented at baseline   SKIN   Chronic wrinkles/blemishes    PSYCHIATRIC   Mood/affect stable         Additional Data:     Labs & Recent Cultures:    Results from last 7 days   Lab Units 24  0503   WBC Thousand/uL 13.82*   HEMOGLOBIN g/dL 9.6*   HEMATOCRIT % 31.8*   PLATELETS Thousands/uL 460*   SEGS PCT % 75   LYMPHO PCT % 13*   MONO PCT % 9   EOS PCT % 2     Results from last 7 days   Lab Units 24  0503   POTASSIUM mmol/L 3.2*   CHLORIDE mmol/L 100   CO2 mmol/L 24   BUN mg/dL 17   CREATININE mg/dL 0.78   CALCIUM mg/dL 11.6*   ALK PHOS U/L 55   ALT U/L 4*   AST U/L 12*                                 Lines/Drains:  Invasive " Devices       Peripheral Intravenous Line  Duration             Peripheral IV 04/12/24 Right Antecubital 102 days    Peripheral IV 07/22/24 Right;Upper;Ventral (anterior) Arm 1 day                      Last 24 Hours Medication List:   Current Facility-Administered Medications   Medication Dose Route Frequency Provider Last Rate    acetaminophen  650 mg Oral Q6H PRN Olayide D Olaniyan, CRNP      allopurinol  100 mg Oral Daily Olayide D Olaniyan, CRNP      apixaban  5 mg Oral BID Olayide D Olaniyan, CRNP      bisacodyl  10 mg Rectal Daily PRN Olayide D Olaniyan, CRNP      carvedilol  3.125 mg Oral BID With Meals Olayide D Olaniyan, CRNP      colchicine  0.6 mg Oral Daily Olayide D Olaniyan, CRNP      docusate sodium  100 mg Oral BID Olayide D Olaniyan, CRNP      famotidine  40 mg Oral Daily Olayide D Olaniyan, CRNP      montelukast  10 mg Oral QPM Olayide D Olaniyan, CRNP      ondansetron  4 mg Intravenous Q6H PRN Olayide D Olaniyan, CRNP      polyethylene glycol  17 g Oral Daily Olayide D Olaniyan, CRNP      risperiDONE  0.25 mg Oral Daily Olayide D Olaniyan, CRNP      senna  1 tablet Oral Daily Olayide D Olaniyan, CRNP      sertraline  25 mg Oral Daily Olayide D Olaniyan, CRNP      sodium chloride  125 mL/hr Intravenous Continuous Joselyn Reyes Bahamonde,  mL/hr (07/23/24 1603)              MYA AUGUST MD   Hospitalist - [facility]        ** Please Note: This note is constructed using a voice recognition dictation system.  An occasional wrong word/phrase or “sound-a-like” substitution may have been picked up by dictation device due to the inherent limitations of voice recognition software.  Read the chart carefully and recognize, using reasonable context, where substitutions may have occurred.**

## 2024-07-23 NOTE — ASSESSMENT & PLAN NOTE
CT imaging with evidence of a prior pelvic hernia, pancreatic head mass with left hepatic lesion, and a 5 mm left lower lobe pulmonary nodule  Check CA 19-9 tumor marker  Oncology following

## 2024-07-23 NOTE — ASSESSMENT & PLAN NOTE
As evidenced by calcium 13.5 and ionized 1.69 on admission -> serum calcium of 11.6 today, elevated at 12.4 when corrected  Likely secondary to worsening malignancy as evidenced by findings on CT abdomen/pelvis which showed worsening malignancy and considerably worsening metastatic disease compared to CT abdomen/pelvis of 1/13/2024.  Appreciate nephrology input -> continue IV fluids with ongoing calcitonin trial and a dose of IV diuretic (Bumex)  Monitor serum and ionized calcium daily  See plan for abnormal CT below

## 2024-07-23 NOTE — ASSESSMENT & PLAN NOTE
Serum sodium currently at 131  Likely secondary to decreased oral intake -> on IV fluids  Monitor serum sodium

## 2024-07-23 NOTE — PHYSICAL THERAPY NOTE
"       PHYSICAL THERAPY EVALUATION/TREATMENT     07/23/24 1015   PT Last Visit   PT Visit Date 07/23/24   Note Type   Note type Evaluation   Pain Assessment   Pain Assessment Tool Valiente-Baker FACES   Valiente-Baker FACES Pain Rating 6  (B knee areas)   Restrictions/Precautions   Other Precautions Chair Alarm;Bed Alarm;Fall Risk;Pain   Home Living   Type of Home House   Home Layout One level;Ramped entrance   Bathroom Equipment Shower chair   Home Equipment Walker;Wheelchair-manual;Cane   Additional Comments patient states using rollator walker in home for short distances prior to admission although with declined function over the past week  and possibly bed bound   Prior Function   Level of [place] Needs assistance with ADLs;Needs assistance with functional mobility;Needs assistance with IADLS   Lives With Alone   Receives Help From Other (Comment)  (patient with a caregiver living in her home but the caregiver also works outside of the patient's home)   IADLs Family/Friend/Other provides transportation;Family/Friend/Other provides meals;Family/Friend/Other provides medication management   Falls in the last 6 months 1 to 4   Comments patient states requiring assist for all ADLs prior to admission and looking   General   Additional Pertinent History chart reviewed patient admitted with generalized weakness. now presents as generally weak and deconditioned with resulting gait dysfunction   Family/Caregiver Present No   Cognition   Overall Cognitive Status Impaired   Arousal/Participation Cooperative   Attention Attends with cues to redirect   Orientation Level Oriented to person;Oriented to place   Following Commands Follows one step commands with increased time or repetition   Comments patient easily distracted and rambling at times   Subjective   Subjective patient states being very limited in her mobility over the past week in her home (Staying in bed\")   RLE Assessment   RLE Assessment   (ROM WFL, strength 3/5) "   LLE Assessment   LLE Assessment   (ROM WFL, strength 3+/5)   Coordination   Movements are Fluid and Coordinated 0   Coordination and Movement Description decreased coordination and balance with transfers and gait activity   Bed Mobility   Supine to Sit 2  Maximal assistance   Additional items Assist x 1   Additional Comments patient OOB in chair at end of session   Transfers   Sit to Stand 3  Moderate assistance   Additional items Assist x 1   Stand to Sit 3  Moderate assistance   Additional items Assist x 1;Verbal cues  (cuing for hand placement and technique for safety and fall prevention)   Ambulation/Elevation   Gait Assistance 3  Moderate assist   Additional items Assist x 2   Assistive Device Rolling walker   Distance 5 feet with assist for weight shifting and increased assist for command follow and walker guidance due to patient tearful during gait and not specific as to why she was tearful   Balance   Static Sitting Fair -   Dynamic Sitting Poor +   Static Standing Poor   Dynamic Standing Poor   Ambulatory Poor   Activity Tolerance   Activity Tolerance Patient limited by fatigue;Patient limited by pain   Nurse Made Aware yes   Assessment   Prognosis Good   Problem List Decreased strength;Decreased range of motion;Decreased endurance;Impaired balance;Decreased mobility;Decreased coordination;Pain   Goals   Patient Goals to feel better and stronger   STG Expiration Date 07/30/24   Short Term Goal #1 transfers and gait with roller walker with min assist   Short Term Goal #2 gait endurance to 15 feet, sitting balance to good   LTG Expiration Date 08/06/24   Long Term Goal #1 strength BLEs 3+/5   Long Term Goal #2 stand pivot transfers with min assist of 1   Plan   Treatment/Interventions ADL retraining;Functional transfer training;LE strengthening/ROM;Therapeutic exercise;Endurance training;Patient/family training;Equipment eval/education;Bed mobility;Gait training;Compensatory technique education   PT  Frequency Other (Comment)  (5x/week)   Discharge Recommendation   Rehab Resource Intensity Level, PT II (Moderate Resource Intensity)   AM-PAC Basic Mobility Inpatient   Turning in Flat Bed Without Bedrails 2   Lying on Back to Sitting on Edge of Flat Bed Without Bedrails 2   Moving Bed to Chair 2   Standing Up From Chair Using Arms 2   Walk in Room 2   Climb 3-5 Stairs With Railing 1   Basic Mobility Inpatient Raw Score 11   Basic Mobility Standardized Score 30.25   [facility] Highest Level Of Mobility   [facility] Goal 4: Move to chair/commode   -[facility] Achieved 6: Walk 10 steps or more   Barthel Index   Feeding 5   Bathing 0   Grooming Score 0   Dressing Score 0   Bladder Score 0   Bowels Score 5   Toilet Use Score 5   Transfers (Bed/Chair) Score 5   Mobility (Level Surface) Score 0   Stairs Score 0   Barthel Index Score 20   Additional Treatment Session   Start Time 1000   End Time 1015   Treatment Assessment s: patient with pain in Bknees O: BLE exercise completed as listed below A: patient will benefit from continued PT with progression as tolerated and increasing functional mobility with clinical staff as well   Exercises   Glute Sets Sitting;10 reps;Bilateral   Hip Flexion Sitting;10 reps;Bilateral   Knee AROM Long Arc Quad Sitting;10 reps;Bilateral   Ankle Pumps Sitting;5 reps;Bilateral   Licensure   [place] License Number  Rosemarie Myers PT [id number]

## 2024-07-23 NOTE — CONSULTS
Consultation - Nephrology   Kristen Maguire 88 y.o. female MRN: [id number]  Unit/Bed#: [address] [place] [address] Encounter: [id number]    ASSESSMENT AND PLAN:   88 y.o.  woman with PMH of GERD, asthma, A-fib, pancreatic/liver cancer p/w weakness.  Nephrology is consulted for management of hypercalcemia.    PLAN      # Hypercalcemia  Most likely secondary to malignancy  Corrected calcium 12.4 mg/dL  Increase normal saline to 125 mL/h  Bumex 1 mg once today, will reassess  monitor with ionized calcium  Calcitonin x 1  Monitor urinary output    #Volume status/hypertension:  Volume: Euvolemic  Blood pressure: Tendency to hypotension, /44  Recommend:  Low-sodium diet  Bumex as above    # Hepatic/pancreatic lesion  Concerning for malignancy  Management as per primary team    # Exophytic right kidney cyst  Consider urology evaluation      The highlighted and/or bolded points in my assessment, plan, and disposition were discussed with the primary team and they agree with those points and the plan.  Previous records were personally reviewed by me to obtain a baseline creatinine.   The images (CXR) were personally reviewed by me in PACS      HISTORY OF PRESENT ILLNESS:  Requesting Physician: Tomas Haro MD  Reason for Consult: Hypercalcemia    Kristen Maguire is a 88 y.o.  female with PMH of GERD, asthma, A-fib, pancreatic/liver cancer?  Who was admitted to [facility] after presenting with weakness. A renal consultation is requested today for assistance in the management of hypercalcemia.    PAST MEDICAL HISTORY:  Past Medical History:   Diagnosis Date    Arthritis     Cardiac disease     Gout     Liver cancer (HCC)     Lyme disease     Pancreatic cancer (HCC)        PAST SURGICAL HISTORY:  Past Surgical History:   Procedure Laterality Date    CARDIAC PACEMAKER PLACEMENT      CHOLECYSTECTOMY      HYSTERECTOMY      JOINT REPLACEMENT         ALLERGIES:  Allergies   Allergen Reactions    Aspirin Other (See Comments)    Chocolate -  Food Allergy Other (See Comments)     constipation    Ciprofloxacin Hives    Doxycycline Hives    Iodinated Contrast Media Other (See Comments)    Peanut Oil - Food Allergy Hives    Penicillins Hives    Soy Allergy - Food Allergy Other (See Comments)    Soybean Oil - Food Allergy Nausea Only    Erythromycin Base Rash       SOCIAL HISTORY:  Social History     Substance and Sexual Activity   Alcohol Use Never     Social History     Substance and Sexual Activity   Drug Use No     Social History     Tobacco Use   Smoking Status Never   Smokeless Tobacco Never       FAMILY HISTORY:  History reviewed. No pertinent family history.    MEDICATIONS:    Current Facility-Administered Medications:     acetaminophen (TYLENOL) tablet 650 mg, 650 mg, Oral, Q6H PRN, Olayide D Olaniyan, CRNP    allopurinol (ZYLOPRIM) tablet 100 mg, 100 mg, Oral, Daily, Olayide D Olaniyan, CRNP    apixaban (ELIQUIS) tablet 5 mg, 5 mg, Oral, BID, Olayide D Olaniyan, CRNP, 5 mg at 07/22/24 2001    bisacodyl (DULCOLAX) rectal suppository 10 mg, 10 mg, Rectal, Daily PRN, Olayide D Olaniyan, CRNP    bisacodyl (DULCOLAX) rectal suppository 10 mg, 10 mg, Rectal, Once, Olayide D Olaniyan, CRNP    bumetanide (BUMEX) injection 1 mg, 1 mg, Intravenous, Once, Joselyn Reyes Bahamonde, MD    calcitonin (salmon) (MIACALCIN) injection 100 Units, 100 Units, Intramuscular, Once, Joselyn Reyes Bahamonde, MD    carvedilol (COREG) tablet 3.125 mg, 3.125 mg, Oral, BID With Meals, Olayide D Olaniestuardo, CRNP    colchicine (COLCRYS) tablet 0.6 mg, 0.6 mg, Oral, Daily, Olayide D Olaniyan, CRNP    docusate sodium (COLACE) capsule 100 mg, 100 mg, Oral, BID, Olayide D Olaniyan, CRNP, 100 mg at 07/22/24 2001    famotidine (PEPCID) tablet 40 mg, 40 mg, Oral, Daily, Olayide D Olaniyan, CRNP    montelukast (SINGULAIR) tablet 10 mg, 10 mg, Oral, QPM, Olayide D Olaniyan, CRNP    ondansetron (ZOFRAN) injection 4 mg, 4 mg, Intravenous, Q6H PRN, HOPE Coronado, 4 mg at 07/23/24  0507    polyethylene glycol (MIRALAX) packet 17 g, 17 g, Oral, Daily, Olayide D Olaniyan, CRNP    risperiDONE (RisperDAL) tablet 0.25 mg, 0.25 mg, Oral, Daily, Olayide D Olaniyan, CRNP    senna (SENOKOT) tablet 8.6 mg, 1 tablet, Oral, Daily, Olayide D Olaniyan, CRNP    sertraline (ZOLOFT) tablet 25 mg, 25 mg, Oral, Daily, Olayide D Olaniyan, CRNP    sodium chloride 0.9 % infusion, 125 mL/hr, Intravenous, Continuous, Joselyn Reyes Bahamonde, MD, Last Rate: 100 mL/hr at 07/23/24 0604, 100 mL/hr at 07/23/24 0604    REVIEW OF SYSTEMS:  Complete 10 point review of systems were obtained and discussed in length with the patient. Complete review of systems were negative / unremarkable except mentioned in the HPI section.     Review of Systems - Psychological ROS: negative  Ophthalmic ROS: negative  ENT ROS: negative  Hematological and Lymphatic ROS: positive for - fatigue  Endocrine ROS: negative  Respiratory ROS: no cough, shortness of breath, or wheezing  Cardiovascular ROS: no chest pain or dyspnea on exertion  Gastrointestinal ROS: no abdominal pain, change in bowel habits, or black or bloody stools  Genito-Urinary ROS: no dysuria, trouble voiding, or hematuria  Musculoskeletal ROS: negative  Neurological ROS: positive for - confusion  Dermatological ROS: negative     PHYSICAL EXAM:  Current Weight: Weight - Scale: 95.5 kg (210 lb 9.6 oz)  First Weight: Weight - Scale: 95.5 kg (210 lb 9.6 oz)  Vitals:    07/23/24 0214   BP: (!) 110/44   Pulse: 62   Resp: 16   Temp: 98.8 °F (37.1 °C)   SpO2: 91%     No intake or output data in the 24 hours ending 07/23/24 0742  Wt Readings from Last 3 Encounters:   07/22/24 95.5 kg (210 lb 9.6 oz)   04/12/24 94.8 kg (209 lb)   04/04/24 94.8 kg (209 lb)     Temp Readings from Last 3 Encounters:   07/23/24 98.8 °F (37.1 °C)   04/12/24 (!) 97.4 °F (36.3 °C)   02/20/24 97.7 °F (36.5 °C) (Oral)     BP Readings from Last 3 Encounters:   07/23/24 (!) 110/44   04/12/24 122/58   04/04/24 131/71      Pulse Readings from Last 3 Encounters:   07/23/24 62   04/12/24 58   04/04/24 60      General:  no acute distress at this time  Skin:  No acute rash  Eyes:  No scleral icterus and noninjected  ENT:  mucous membranes moist  Neck:  no carotid bruits  Chest:  Clear to auscultation percussion, good respiratory effort, no use of accessory respiratory muscles  CVS:  Regular rate and rhythm without rub   Abdomen:  soft and nontender   Extremities: no significant lower extremity edema  Neuro:  No gross focality  Psych:  Alert , cooperative   :     Invasive Devices:      Lab Results:   Results from last 7 days   Lab Units 07/23/24  0503 07/22/24  1212   WBC Thousand/uL 13.82* 13.84*   HEMOGLOBIN g/dL 9.6* 11.0*   HEMATOCRIT % 31.8* 36.0   PLATELETS Thousands/uL 460* 498*   POTASSIUM mmol/L 3.2* 3.6   CHLORIDE mmol/L 100 98   CO2 mmol/L 24 27   BUN mg/dL 17 19   CREATININE mg/dL 0.78 0.89   CALCIUM mg/dL 11.6* 13.5*   MAGNESIUM mg/dL 2.0 2.2       Other Studies:   CT abdomen pelvis wo contrast   Final Result by Max Kaye MD (07/22 1607)      Fluid-filled distal esophagus.   The stomach is unremarkable without hernia.   No evidence of obstruction.      Stable appearance of an anterior pelvic abdominal wall fat-containing hernia with similar infiltration of the involved herniated fat.   Clinical correlation advised for focal pain and reducibility.      Enlarging central left hepatic lesion, not well evaluated on this noncontrast exam.   Interval decrease in size of a exophytic irregular lesion from the right kidney upper pole, possibly representing an involuting cyst component.   Grossly stable cystic pancreatic lesions as described, with large ill-defined lesion in the pancreatic head.      New enlarged abdominal nodes as described, measuring up to 3 cm.   New 5 mm left lower lobe pulmonary nodule.      Overall the above findings are consistent with worsening malignancy and considerably worsening metastatic  "disease.   Limit evaluation on this noncontrast exam.   Postcontrast MRI versus CT recommended for further evaluation.         The study was marked in EPIC for immediate notification.      Workstation performed: [id number]         XR chest 1 view   Final Result by Devyn Scanlon MD (07/22 1746)      Prominent left paramediastinal density which may be accentuated due to positioning. Known left thyroid goiter. Follow-up with be recommended.      The study was marked in EPIC for immediate notification.      Workstation performed: [id number]         CT head without contrast   Final Result by Laci Miller MD (07/22 1400)      No acute intracranial abnormality.                  Workstation performed: [id number]             Portions of the record may have been created with voice recognition software. Occasional wrong word or \"sound a like\" substitutions may have occurred due to the inherent limitations of voice recognition software. Read the chart carefully and recognize, using context, where substitutions have occurred.    "

## 2024-07-23 NOTE — ASSESSMENT & PLAN NOTE
CT abdomen/pelvis demonstrated large colonic stool burden.  Small bowel unremarkable without evidence of obstruction.  Optimize bowel regimen as necessary

## 2024-07-23 NOTE — CONSULTS
Consultation - Medical Oncology   Kristen Maguire 88 y.o. female MRN: [id number]  Unit/Bed#: [address] [place] [address] Encounter: [id number]      Assessment & Plan   Patient is an 88 year-old who is a poor historian.    She presented with generalized weakness and vomiting. She was found to have hypercalcemia.     She had CT scan on 7/22/2024 which showed enlarging central left hepatic lesion, not well-evaluated on this noncontrast exam.  Grossly stable cystic pancreatic lesions with large ill-defined lesion in the pancreatic head.  New enlarged abdominal nodes, measuring up to 3 cm.  New 5 mm left lower lobe pulmonary nodule.  Overall the above findings are consistent with worsening malignancy and considerably worsening metastatic disease.    Patient is following with Oncology at [facility]. She had a biopsy about 2 months ago per her caregiver. Patient has refused additional work-up per her recount. I will try to speak with patient's daughter.    She has poor mobility, ?dementia. She would likely be a poor candidate for palliative treatment but will defer to primary oncology team at [facility].    She has hypercalcemia which is being managed by nephrology and may be related to malignancy. Improving with current management.    History of Present Illness   Physician Requesting Consult: Milady Wiggins MD  Reason for Consult / Principal Problem: Metastatic disease.  HPI: Kristen Maguire is a 88 y.o. year old female.  Her past medical history is significant for reflux, asthma, A-fib on Eliquis, dementia.    She presented for evaluation of generalized weakness and vomiting.  She was noted on presentation to have hypercalcemia with calcium 13.5.  She had CT scan on 7/22/2024 which showed enlarging central left hepatic lesion, not well-evaluated on this noncontrast exam.  Grossly stable cystic pancreatic lesions with large ill-defined lesion in the pancreatic head.  New enlarged abdominal nodes, measuring up to 3 cm.  New 5 mm left lower  "lobe pulmonary nodule.  Overall the above findings are consistent with worsening malignancy and considerably worsening metastatic disease.    I spoke to patient's caregiver. She tells me that Kristen has been aware of the mass on her pancreas for \"many years.\" She says she was seen at [facility] and had a recent liver biopsy. She says she was told she has \"liver cancer.\" She says her provider at [facility] wants her to have an additional biopsy but patient has refused. She tells me that Kristen has not wanted any further work-up.     Kristen is a poor historian. She cannot provider any meaningful history. Her calcium is improving with IVF. Patient complains of pain in her legs and difficulty with ambulating. She denies any pain otherwise.  She has lost around 20 pounds in the past year. No SOB.    Inpatient consult to Oncology  Consult performed by: Elsie Barr PA-C  Consult ordered by: HOPE Coronado          ROS:   12 point review of systems negative unless stated in the HPI.    Historical Information   Past Medical History:   Diagnosis Date    Arthritis     Cardiac disease     Gout     Liver cancer (HCC)     Lyme disease     Pancreatic cancer (HCC)      Past Surgical History:   Procedure Laterality Date    CARDIAC PACEMAKER PLACEMENT      CHOLECYSTECTOMY      HYSTERECTOMY      JOINT REPLACEMENT       Social History   Social History     Substance and Sexual Activity   Alcohol Use Never     Social History     Substance and Sexual Activity   Drug Use No     E-Cigarette/Vaping    E-Cigarette Use Never User      E-Cigarette/Vaping Substances    Nicotine No     THC No     CBD No     Flavoring No     Other No     Unknown No      Social History     Tobacco Use   Smoking Status Never   Smokeless Tobacco Never     Family History: non-contributory    Meds/Allergies   all current active meds have been reviewed  Allergies   Allergen Reactions    Aspirin Other (See Comments)    Chocolate - Food Allergy Other (See " "Comments)     constipation    Ciprofloxacin Hives    Doxycycline Hives    Iodinated Contrast Media Other (See Comments)    Peanut Oil - Food Allergy Hives    Penicillins Hives    Soy Allergy - Food Allergy Other (See Comments)    Soybean Oil - Food Allergy Nausea Only    Erythromycin Base Rash       Objective   Vitals: Blood pressure 124/52, pulse 60, temperature 98.1 °F (36.7 °C), resp. rate 18, height 5' 4\" (1.626 m), weight 95.5 kg (210 lb 9.6 oz), SpO2 90%.  No intake or output data in the 24 hours ending 07/23/24 0908  Invasive Devices       Peripheral Intravenous Line  Duration             Peripheral IV 04/12/24 Right Antecubital 101 days    Peripheral IV 07/22/24 Right;Upper;Ventral (anterior) Arm <1 day                    PHYSICAL EXAM:  - GENERAL: Alert and oriented. No acute distress.   - EYES: EOMI. Anicteric.  - HENT: Moist mucous membranes.   - LUNGS: Clear to auscultation bilaterally.   - CARDIOVASCULAR: Regular rate and rhythm. No murmur.   - ABDOMEN: Soft, non-tender and non-distended.  - EXTREMITIES: No edema. Non-tender.?SKIN: No rashes or lesions. Warm.  - NEUROLOGIC: No focal neurological deficits. CN II-XII grossly intact, but not individually tested.    Lab Results: CBC:   Lab Results   Component Value Date    WBC 13.82 (H) 07/23/2024    HGB 9.6 (L) 07/23/2024    HCT 31.8 (L) 07/23/2024    MCV 81 (L) 07/23/2024     (H) 07/23/2024    RBC 3.92 07/23/2024    MCH 24.5 (L) 07/23/2024    MCHC 30.2 (L) 07/23/2024    RDW 17.5 (H) 07/23/2024    MPV 9.4 07/23/2024    NRBC 0 07/23/2024     CMP:   Lab Results   Component Value Date    SODIUM 131 (L) 07/23/2024     07/23/2024    CO2 24 07/23/2024    BUN 17 07/23/2024    CREATININE 0.78 07/23/2024    CALCIUM 11.6 (H) 07/23/2024    AST 12 (L) 07/23/2024    ALT 4 (L) 07/23/2024    ALKPHOS 55 07/23/2024    EGFR 68 07/23/2024     Imaging Studies: I have personally reviewed pertinent reports.    Pathology, and Other Studies: I have personally " reviewed pertinent reports.      Counseling / Coordination of Care  Total floor / unit time spent today 75 minutes. Greater than 50% of total time was spent with the patient and / or family counseling and / or coordination of care.

## 2024-07-23 NOTE — OCCUPATIONAL THERAPY NOTE
Occupational Therapy Evaluation/Treatment       07/23/24 1045   OT Last Visit   OT Visit Date 07/23/24   Note Type   Note type Evaluation   Pain Assessment   Pain Assessment Tool Valiente-Baker FACES   Valiente-Baker FACES Pain Rating 6   Pain Location/Orientation Orientation: Bilateral;Location: Knee   Restrictions/Precautions   Other Precautions Chair Alarm;Bed Alarm;Fall Risk;Pain   Home Living   Type of Home House   Home Layout One level;Ramped entrance   Bathroom Shower/Tub Walk-in shower   Bathroom Toilet Standard   Bathroom Equipment Shower chair;Grab bars in shower;Grab bars around toilet   Home Equipment Wheelchair-manual  (rollator)   Additional Comments pt reports she has a caretaker that assists her with ADLS when she isnt working at [employer].  Patient reports being in bed x 1-2 weeks and not getting to a bathroom.  Patient doesnt get up when caretaker isnt present.   Prior Function   Level of [place] Needs assistance with ADLs;Needs assistance with functional mobility;Needs assistance with IADLS   Lives With Other (Comment)  (caretaker)   Receives Help From Home health   IADLs Family/Friend/Other provides transportation;Family/Friend/Other provides meals;Family/Friend/Other provides medication management   General   Family/Caregiver Present   (caretaker present at start of session)   ADL   Eating Assistance 4  Minimal Assistance   Grooming Assistance 3  Moderate Assistance   UB Bathing Assistance 3  Moderate Assistance   LB Bathing Assistance 2  Maximal Assistance   UB Dressing Assistance 3  Moderate Assistance   LB Dressing Assistance 2  Maximal Assistance   Toileting Assistance  2  Maximal Assistance   Bed Mobility   Supine to Sit 2  Maximal assistance   Additional items Assist x 1;LE management   Transfers   Sit to Stand 3  Moderate assistance   Additional items Assist x 1;Verbal cues   Stand to Sit 3  Moderate assistance   Additional items Assist x 1;Verbal cues   Balance   Static Sitting Fair -    Dynamic Sitting Poor +   Static Standing Poor   Dynamic Standing Poor   Activity Tolerance   Activity Tolerance Patient limited by fatigue;Patient limited by pain   Nurse Made Aware yes, [place]   RUE Assessment   RUE Assessment   (10 degrees AROM shoulder flexion, AAROM WFL, elbow limited full flexion due to IV in elbow,  WFL MMT 3+/5)   LUE Assessment   LUE Assessment   (AROM shoulder flexion 50 degrees, AAROM WFL, elbow/ WFL MMT 3+/5)   Cognition   Overall Cognitive Status Impaired   Arousal/Participation Cooperative   Attention Attends with cues to redirect   Orientation Level Oriented to person;Oriented to place   Following Commands Follows one step commands with increased time or repetition   Comments pt is easly distracted,teaful with mobility, questionable historian   Assessment   Limitation Decreased ADL status;Decreased Safe judgement during ADL;Decreased UE strength;Decreased cognition;Decreased endurance;Decreased high-level ADLs;Decreased self-care trans  (decreased balance and mobility)   Prognosis Good   Assessment Patient evaluated by Occupational Therapy.  Patient admitted with Generalized weakness.  The patients occupational profile, medical and therapy history includes a extensive additional review of physical, cognitive, or psychosocial history related to current functional performance.  Comorbidities affecting functional mobility and ADLS include: arthritis, cancer, cardiac disease, gout, and pacemaker.  Prior to admission, patient was requiring assist for functional mobility with wheelchair/RW, requiring assist for ADLS, and requiring assist for IADLS.  The evaluation identifies the following performance deficits: weakness, impaired balance, decreased endurance, increased fall risk, new onset of impairment of functional mobility, decreased ADLS, decreased IADLS, pain, decreased activity tolerance, decreased safety awareness, impaired judgement, decreased cognition, and decreased  strength, that result in activity limitations and/or participation restrictions. This evaluation requires clinical decision making of high complexity, because the patient presents with comorbidites that affect occupational performance and required significant modification of tasks or assistance with consideration of multiple treatment options.  The Barthel Index was used as a functional outcome tool presenting with a score of Barthel Index Score: 15, indicating marked limitations of functional mobility and ADLS.  The patient's raw score on the -PAC Daily Activity Inpatient Short Form is 10. A raw score of less than 19 suggests the patient may benefit from discharge to post-acute rehabilitation services. Please refer to the recommendation of the Occupational Therapist for safe discharge planning.  Patient will benefit from skilled Occupational Therapy services to address above deficits and facilitate a safe return to prior level of function.   Goals   Patient Goals to feel better   STG Time Frame   (1-7 days)   Short Term Goal  Goals established to promote Patient Goals: to feel better:  Eating: supervision; Grooming: min assist seated; Bathing: mod assist; Upper Body Dressing min assist; Lower Body Dressing: mod assist; Toileting: mod assist; Patient will increase stand pivot commode transfer to mod assist with rolling walker to increase performance and safety with ADLS and functional mobility; Patient will increase standing tolerance to 3 minutes during ADL task to decrease assistance level and decrease fall risk; Patient will increase bed mobility to mod assist in preparation for ADLS and transfers; Patient will increase functional mobility to and from bathroom with rolling walker with mod assist to increase performance with ADLS and to use a toilet; Patient will tolerate 5 minutes of UE ROM/strengthening to increase general activity tolerance and performance in ADLS/IADLS; Patient will improve functional  activity tolerance to 10 minutes of sustained functional tasks to increase participation in basic self-care and decrease assistance level; Patient will increase dynamic sitting balance to fair- to improve the ability to sit at edge of bed or on a chair for ADLS;  Patient will increase dynamic standing balance to poor+ to improve postural stability and decrease fall risk during standing ADLS and transfers.   LTG Time Frame   (8-14 days)   Long Term Goal Eating: independent; Grooming: supervision seated; Bathing: min assist; Upper Body Dressing supervision; Lower Body Dressing: min assist; Toileting: min assist; Patient will increase stand pivot commode transfer to min assist with rolling walker to increase performance and safety with ADLS and functional mobility; Patient will increase standing tolerance to 6 minutes during ADL task to decrease assistance level and decrease fall risk; Patient will increase bed mobility to min assist in preparation for ADLS and transfers; Patient will increase functional mobility to and from bathroom with rolling walker with min assist to increase performance with ADLS and to use a toilet; Patient will tolerate 10 minutes of UE ROM/strengthening to increase general activity tolerance and performance in ADLS/IADLS; Patient will improve functional activity tolerance to 20 minutes of sustained functional tasks to increase participation in basic self-care and decrease assistance level; Patient will increase dynamic sitting balance to fair to improve the ability to sit at edge of bed or on a chair for ADLS;  Patient will increase dynamic standing balance to fair- to improve postural stability and decrease fall risk during standing ADLS and transfers.   Pt will score >/= 14/24 on AM-PAC Daily Activity Inpatient scale to promote safe independence with ADLs and functional mobility; Pt will score >/= 45/100 on Barthel Index in order to decrease caregiver assistance needed and increase ability  to perform ADLs and functional mobility.   Plan   Treatment Interventions ADL retraining;Functional transfer training;UE strengthening/ROM;Endurance training;Patient/family training;Equipment evaluation/education;Cognitive reorientation;Activityengagement;Compensatory technique education   Goal Expiration Date 08/06/24   OT Frequency 3-5x/wk   Discharge Recommendation   Rehab Resource Intensity Level, OT II (Moderate Resource Intensity)   AM-PAC Daily Activity Inpatient   Lower Body Dressing 1   Bathing 1   Toileting 1   Upper Body Dressing 2   Grooming 2   Eating 3   Daily Activity Raw Score 10   Turning Head Towards Sound 4   Follow Simple Instructions 4   Low Function Daily Activity Raw Score 18   Low Function Daily Activity Standardized Score  30.17   AM-PAC Applied Cognition Inpatient   Following a Speech/Presentation 3   Understanding Ordinary Conversation 4   Taking Medications 3   Remembering Where Things Are Placed or Put Away 3   Remembering List of 4-5 Errands 2   Taking Care of Complicated Tasks 2   Applied Cognition Raw Score 17   Applied Cognition Standardized Score 36.52   Barthel Index   Feeding 5   Bathing 0   Grooming Score 0   Dressing Score 0   Bladder Score 0   Bowels Score 0   Toilet Use Score 5   Transfers (Bed/Chair) Score 5   Mobility (Level Surface) Score 0   Stairs Score 0   Barthel Index Score 15   Additional Treatment Session   Start Time 1030   End Time 1045   Treatment Assessment S: FACES 6/10 B knees with activity O: Completed sit to stand x 2 with mod assist.  Hygiene for BM in stance dependent.  Functional mobility few steps bed to chair with RW mod assist of 2.  Stand to sit mod assist of 2.  A: Patient incontinent of BM and urine.  P: II-moderate resource intensity   Licensure   [place] License Number  Diane Sharma MS OTR/L [id number]

## 2024-07-24 PROBLEM — E87.6 HYPOKALEMIA: Status: RESOLVED | Noted: 2024-07-23 | Resolved: 2024-07-24

## 2024-07-24 PROBLEM — Z71.89 GOALS OF CARE, COUNSELING/DISCUSSION: Status: ACTIVE | Noted: 2024-07-24

## 2024-07-24 LAB
ALBUMIN SERPL BCG-MCNC: 2.8 G/DL (ref 3.5–5)
ALP SERPL-CCNC: 59 U/L (ref 34–104)
ALT SERPL W P-5'-P-CCNC: 5 U/L (ref 7–52)
ANION GAP SERPL CALCULATED.3IONS-SCNC: 4 MMOL/L (ref 4–13)
AST SERPL W P-5'-P-CCNC: 11 U/L (ref 13–39)
BASOPHILS # BLD AUTO: 0.05 THOUSANDS/ÂΜL (ref 0–0.1)
BASOPHILS NFR BLD AUTO: 0 % (ref 0–1)
BILIRUB SERPL-MCNC: 0.45 MG/DL (ref 0.2–1)
BUN SERPL-MCNC: 12 MG/DL (ref 5–25)
CA-I BLD-SCNC: 1.42 MMOL/L (ref 1.12–1.32)
CALCIUM ALBUM COR SERPL-MCNC: 11.5 MG/DL (ref 8.3–10.1)
CALCIUM SERPL-MCNC: 10.5 MG/DL (ref 8.4–10.2)
CHLORIDE SERPL-SCNC: 103 MMOL/L (ref 96–108)
CO2 SERPL-SCNC: 26 MMOL/L (ref 21–32)
CREAT SERPL-MCNC: 0.67 MG/DL (ref 0.6–1.3)
EOSINOPHIL # BLD AUTO: 0.2 THOUSAND/ÂΜL (ref 0–0.61)
EOSINOPHIL NFR BLD AUTO: 2 % (ref 0–6)
ERYTHROCYTE [DISTWIDTH] IN BLOOD BY AUTOMATED COUNT: 17.5 % (ref 11.6–15.1)
GFR SERPL CREATININE-BSD FRML MDRD: 79 ML/MIN/1.73SQ M
GLUCOSE SERPL-MCNC: 91 MG/DL (ref 65–140)
HCT VFR BLD AUTO: 31 % (ref 34.8–46.1)
HGB BLD-MCNC: 9.2 G/DL (ref 11.5–15.4)
IMM GRANULOCYTES # BLD AUTO: 0.07 THOUSAND/UL (ref 0–0.2)
IMM GRANULOCYTES NFR BLD AUTO: 1 % (ref 0–2)
LYMPHOCYTES # BLD AUTO: 1.45 THOUSANDS/ÂΜL (ref 0.6–4.47)
LYMPHOCYTES NFR BLD AUTO: 11 % (ref 14–44)
MCH RBC QN AUTO: 24.5 PG (ref 26.8–34.3)
MCHC RBC AUTO-ENTMCNC: 29.7 G/DL (ref 31.4–37.4)
MCV RBC AUTO: 82 FL (ref 82–98)
MONOCYTES # BLD AUTO: 1.26 THOUSAND/ÂΜL (ref 0.17–1.22)
MONOCYTES NFR BLD AUTO: 10 % (ref 4–12)
NEUTROPHILS # BLD AUTO: 9.84 THOUSANDS/ÂΜL (ref 1.85–7.62)
NEUTS SEG NFR BLD AUTO: 76 % (ref 43–75)
NRBC BLD AUTO-RTO: 0 /100 WBCS
PLATELET # BLD AUTO: 418 THOUSANDS/UL (ref 149–390)
PMV BLD AUTO: 9.8 FL (ref 8.9–12.7)
POTASSIUM SERPL-SCNC: 3.9 MMOL/L (ref 3.5–5.3)
PROT SERPL-MCNC: 5.6 G/DL (ref 6.4–8.4)
RBC # BLD AUTO: 3.76 MILLION/UL (ref 3.81–5.12)
SODIUM SERPL-SCNC: 133 MMOL/L (ref 135–147)
WBC # BLD AUTO: 12.87 THOUSAND/UL (ref 4.31–10.16)

## 2024-07-24 PROCEDURE — 82330 ASSAY OF CALCIUM: CPT | Performed by: INTERNAL MEDICINE

## 2024-07-24 PROCEDURE — 85025 COMPLETE CBC W/AUTO DIFF WBC: CPT | Performed by: INTERNAL MEDICINE

## 2024-07-24 PROCEDURE — 99233 SBSQ HOSP IP/OBS HIGH 50: CPT | Performed by: STUDENT IN AN ORGANIZED HEALTH CARE EDUCATION/TRAINING PROGRAM

## 2024-07-24 PROCEDURE — 99497 ADVNCD CARE PLAN 30 MIN: CPT | Performed by: INTERNAL MEDICINE

## 2024-07-24 PROCEDURE — 99232 SBSQ HOSP IP/OBS MODERATE 35: CPT | Performed by: INTERNAL MEDICINE

## 2024-07-24 PROCEDURE — 80053 COMPREHEN METABOLIC PANEL: CPT | Performed by: INTERNAL MEDICINE

## 2024-07-24 RX ORDER — CALCITONIN SALMON 200 [USP'U]/ML
100 INJECTION, SOLUTION INTRAMUSCULAR; SUBCUTANEOUS DAILY
Status: DISCONTINUED | OUTPATIENT
Start: 2024-07-24 | End: 2024-07-25

## 2024-07-24 RX ORDER — BUMETANIDE 0.25 MG/ML
1 INJECTION INTRAMUSCULAR; INTRAVENOUS 2 TIMES DAILY
Status: DISCONTINUED | OUTPATIENT
Start: 2024-07-24 | End: 2024-07-25

## 2024-07-24 RX ADMIN — RISPERIDONE 0.25 MG: 0.25 TABLET, FILM COATED ORAL at 08:48

## 2024-07-24 RX ADMIN — CALCITONIN SALMON 100 UNITS: 200 INJECTION, SOLUTION INTRAMUSCULAR; SUBCUTANEOUS at 11:56

## 2024-07-24 RX ADMIN — FAMOTIDINE 40 MG: 20 TABLET, FILM COATED ORAL at 08:47

## 2024-07-24 RX ADMIN — SERTRALINE HYDROCHLORIDE 25 MG: 25 TABLET ORAL at 08:48

## 2024-07-24 RX ADMIN — SODIUM CHLORIDE 125 ML/HR: 0.9 INJECTION, SOLUTION INTRAVENOUS at 22:34

## 2024-07-24 RX ADMIN — MONTELUKAST 10 MG: 10 TABLET, FILM COATED ORAL at 17:33

## 2024-07-24 RX ADMIN — COLCHICINE 0.6 MG: 0.6 TABLET ORAL at 08:47

## 2024-07-24 RX ADMIN — SENNOSIDES 8.6 MG: 8.6 TABLET, FILM COATED ORAL at 08:46

## 2024-07-24 RX ADMIN — BUMETANIDE 1 MG: 0.25 INJECTION INTRAMUSCULAR; INTRAVENOUS at 08:46

## 2024-07-24 RX ADMIN — APIXABAN 5 MG: 5 TABLET, FILM COATED ORAL at 21:20

## 2024-07-24 RX ADMIN — CARVEDILOL 3.12 MG: 3.12 TABLET, FILM COATED ORAL at 08:48

## 2024-07-24 RX ADMIN — DOCUSATE SODIUM 100 MG: 100 CAPSULE, LIQUID FILLED ORAL at 08:46

## 2024-07-24 RX ADMIN — SODIUM CHLORIDE 125 ML/HR: 0.9 INJECTION, SOLUTION INTRAVENOUS at 04:09

## 2024-07-24 RX ADMIN — ALLOPURINOL 100 MG: 100 TABLET ORAL at 08:47

## 2024-07-24 RX ADMIN — DOCUSATE SODIUM 100 MG: 100 CAPSULE, LIQUID FILLED ORAL at 17:33

## 2024-07-24 RX ADMIN — POLYETHYLENE GLYCOL 3350 17 G: 17 POWDER, FOR SOLUTION ORAL at 08:58

## 2024-07-24 RX ADMIN — CARVEDILOL 3.12 MG: 3.12 TABLET, FILM COATED ORAL at 17:33

## 2024-07-24 RX ADMIN — APIXABAN 5 MG: 5 TABLET, FILM COATED ORAL at 08:46

## 2024-07-24 NOTE — ASSESSMENT & PLAN NOTE
Discussed with patient findings on CT A/P in regards to her progression of cancer. Reports she follows with [facility] and was previously on therapies, but stopped due to multiple side effects. Patient at this time isn't interested in resuming chemo/radiation therapies. Discussed that her physical decline is due to her worsening cancer causing electrolyte abnormalities and weakness. Then explained that if she doesn't want to pursue therapy for cancer, we should consider transition to palliative and hospice care. Discussed with patient's daughter she is now more open to the idea of hospice. She plans to talk to her brother and patient today about hospice and will give an answer tomorrow about possible plan for LTC with hospice at discharge.

## 2024-07-24 NOTE — CASE MANAGEMENT
Case Management Discharge Planning Note    Patient name Kristen Maguire  Location [address] [address]/[address] [address]-* MRN [id number]  : 1935 Date 2024       Current Admission Date: 2024  Current Admission Diagnosis:Hypercalcemia   Patient Active Problem List    Diagnosis Date Noted Date Diagnosed    Goals of care, counseling/discussion 2024     History of gout 2024     Hyponatremia 2024     Abnormal CT of the abdomen 2024     Hypercalcemia 2024     Constipation 2024     Dysphagia 2024     Generalized weakness 10/26/2023     COVID-19 virus infection 10/26/2023     Leukocytosis 10/26/2023     Thrombocytosis 10/26/2023     Atrial fibrillation (HCC) 10/26/2023     GERD (gastroesophageal reflux disease) 10/26/2023     Psychiatric disorder 10/26/2023     History of asthma 10/26/2023       LOS (days): 2  Geometric Mean LOS (GMLOS) (days): 2.6  Days to GMLOS:0.7     OBJECTIVE:  Risk of Unplanned Readmission Score: 24.82     Current admission status: Inpatient   Preferred Pharmacy:   [facility] - [place], [place] - [address]  [address] [place] [address]  Phone: [phone] Fax: [phone]    Primary Care Provider: John Nguyen DO    Primary Insurance: MEDICARE  Secondary Insurance: [facility]    DISCHARGE DETAILS:    Discharge planning discussed with:: Patient's Daughter Yomaira  Freedom of Choice: Yes  Comments - Freedom of Choice: CM discussed accpeting facilities with patient's Daughter Yomaira. Yomaira stated discharge plan for patient will be to go to facility first for STR and then transition to LTC. Yomaira stated family/patient choice is [facility]. CM explained [facility] was not included in referrals as it was out of a 10 mile radius. CM will send referral to [facility] now and update Yomaira as soon a possible.  CM contacted family/caregiver?: Yes    Contacts  Patient Contacts: Yomaira-daughter  Relationship to Patient::  Family  Contact Method: Phone  Phone Number: [phone]  Reason/Outcome: Continuity of Care, Discharge Planning    Other Referral/Resources/Interventions Provided:  Referral Comments: CM added [facility] to the referral list in Aidin per patient's family's request.      CM was made aware by AP that patient's Daughter Yomaira confirmed discharge plan is for patient to go to STR facility as recommended and likely transition to LTC. CM updated referral list sent in Aidin and added [facility] per patient's family's request. CM provided [facility] Admissions Representative Georgette with Daughter Yomaira's contact number. CM need to complete and submit EARC per [facility]'s request.

## 2024-07-24 NOTE — ASSESSMENT & PLAN NOTE
As evidenced by calcium 13.5 and ionized 1.69 on admission   Likely secondary to worsening malignancy as evidenced by findings on CT abdomen/pelvis which showed worsening malignancy and considerably worsening metastatic disease compared to CT abdomen/pelvis of 1/13/2024.  Nephrology consulted, recommendations are appreciated  Continue NS @ 100ml/hr  S/p Calcitonin 100mg IM x 2 doses  Added pamidronate 30mg IV x 1 dose  Bumex 1mg IV BID  Monitor daily CMP, ionized calcium  PTH pending

## 2024-07-24 NOTE — ASSESSMENT & PLAN NOTE
Serum sodium currently at 133  Likely secondary to decreased oral intake -> on IV fluids  Monitor serum sodium

## 2024-07-24 NOTE — PROGRESS NOTES
NEPHROLOGY PROGRESS NOTE   Kristen Maguire 88 y.o. female MRN: [id number]  Unit/Bed#: [address] [place] [address] Encounter: [id number]  Reason for Consult: Hypercalcemia    ASSESSMENT AND PLAN:  88 y.o.  woman with PMH of GERD, asthma, A-fib, pancreatic/liver cancer p/w weakness.  Nephrology is consulted for management of hypercalcemia.     PLAN        # Hypercalcemia  Most likely secondary to malignancy  Corrected calcium 11.5 mg/dL, corrected by albumin   Ionized calcium 1.4, trending down  Continue  mL/h with Bumex 1 mg twice daily for 24 hours   monitor with ionized calcium  Repeat calcitonin x 2  Monitor urinary output  Will consider pamidronate tomorrow if no improvement of hypercalcemia     #Volume status/blood pressure :  Volume: Euvolemic  Blood pressure: Normotensive, /55mmHg   Recommend:  Low-sodium diet  Bumex twice daily as above     # Hepatic/pancreatic lesion  Metastatic malignancy   Management as per primary team     # Exophytic right kidney cyst  Consider urology evaluation       The highlighted and/or bolded points in my assessment, plan, and disposition were discussed with the primary team and they agree with those points and the plan.  Previous records were personally reviewed by me to obtain a baseline creatinine.   The images (CXR) were personally reviewed by me in PACS      SUBJECTIVE:  Patient seen and examined at bedside. No chest pain, shortness of breath, nausea, vomiting, abdominal pain or diarrhea.     OBJECTIVE:  Current Weight: Weight - Scale: 95.5 kg (210 lb 9.6 oz)  Vitals:    07/24/24 0314   BP: 110/55   Pulse: 56   Resp: 16   Temp: 98.1 °F (36.7 °C)   SpO2: 91%       Intake/Output Summary (Last 24 hours) at 7/24/2024 0631  Last data filed at 7/23/2024 1603  Gross per 24 hour   Intake 1673.75 ml   Output --   Net 1673.75 ml     Wt Readings from Last 3 Encounters:   07/22/24 95.5 kg (210 lb 9.6 oz)   04/12/24 94.8 kg (209 lb)   04/04/24 94.8 kg (209 lb)     Temp Readings from Last 3  Encounters:   07/24/24 98.1 °F (36.7 °C)   04/12/24 (!) 97.4 °F (36.3 °C)   02/20/24 97.7 °F (36.5 °C) (Oral)     BP Readings from Last 3 Encounters:   07/24/24 110/55   04/12/24 122/58   04/04/24 131/71     Pulse Readings from Last 3 Encounters:   07/24/24 56   04/12/24 58   04/04/24 60        General:  no acute distress at this time  Skin:  No acute rash  Eyes:  No scleral icterus and noninjected  ENT:  mucous membranes moist  Neck:  no carotid bruits  Chest:  Clear to auscultation percussion, good respiratory effort, no use of accessory respiratory muscles  CVS:  Regular rate and rhythm without rub   Abdomen:  soft and nontender   Extremities: no significant lower extremity edema  Neuro:  No gross focality  Psych:  Alert , cooperative       Medications:    Current Facility-Administered Medications:     acetaminophen (TYLENOL) tablet 650 mg, 650 mg, Oral, Q6H PRN, Olayide D Olaniyan, CRNP    allopurinol (ZYLOPRIM) tablet 100 mg, 100 mg, Oral, Daily, Olayide D Olaniyan, CRNP, 100 mg at 07/23/24 0831    apixaban (ELIQUIS) tablet 5 mg, 5 mg, Oral, BID, Olayide D Olaniyan, CRNP, 5 mg at 07/23/24 2049    bisacodyl (DULCOLAX) rectal suppository 10 mg, 10 mg, Rectal, Daily PRN, Olayide D Olaniyan, CRNP    carvedilol (COREG) tablet 3.125 mg, 3.125 mg, Oral, BID With Meals, Olayide D Olaniyan, CRNP, 3.125 mg at 07/23/24 1618    colchicine (COLCRYS) tablet 0.6 mg, 0.6 mg, Oral, Daily, Olayide D Olaniyan, CRNP, 0.6 mg at 07/23/24 0831    docusate sodium (COLACE) capsule 100 mg, 100 mg, Oral, BID, Olayide D Olaniyan, CRNP, 100 mg at 07/23/24 1858    famotidine (PEPCID) tablet 40 mg, 40 mg, Oral, Daily, Olayide D Olaniyan, CRNP, 40 mg at 07/23/24 0833    montelukast (SINGULAIR) tablet 10 mg, 10 mg, Oral, QPM, Olayide D Olaniyan, CRNP, 10 mg at 07/23/24 1858    ondansetron (ZOFRAN) injection 4 mg, 4 mg, Intravenous, Q6H PRN, Olayide D Olaniyan, CRNP, 4 mg at 07/23/24 050    polyethylene glycol (MIRALAX) packet 17 g, 17 g,  Oral, Daily, Olayide D Olaniyan, CRNP, 17 g at 07/23/24 0908    risperiDONE (RisperDAL) tablet 0.25 mg, 0.25 mg, Oral, Daily, Olayide D Olaniyan, CRNP, 0.25 mg at 07/23/24 0833    senna (SENOKOT) tablet 8.6 mg, 1 tablet, Oral, Daily, Olayide D Olaniyan, CRNP, 8.6 mg at 07/23/24 0831    sertraline (ZOLOFT) tablet 25 mg, 25 mg, Oral, Daily, Olayide D Olaniyan, CRNP, 25 mg at 07/23/24 0831    sodium chloride 0.9 % infusion, 125 mL/hr, Intravenous, Continuous, Joselyn Reyes Bahamonde, MD, Last Rate: 125 mL/hr at 07/24/24 0409, 125 mL/hr at 07/24/24 0409    Laboratory Results:  Results from last 7 days   Lab Units 07/24/24  0543 07/23/24  0503 07/22/24  1212   WBC Thousand/uL 12.87* 13.82* 13.84*   HEMOGLOBIN g/dL 9.2* 9.6* 11.0*   HEMATOCRIT % 31.0* 31.8* 36.0   PLATELETS Thousands/uL 418* 460* 498*   SODIUM mmol/L  --  131* 133*   POTASSIUM mmol/L  --  3.2* 3.6   CHLORIDE mmol/L  --  100 98   CO2 mmol/L  --  24 27   BUN mg/dL  --  17 19   CREATININE mg/dL  --  0.78 0.89   CALCIUM mg/dL  --  11.6* 13.5*   MAGNESIUM mg/dL  --  2.0 2.2       CT abdomen pelvis wo contrast   Final Result by Max Kaye MD (07/22 1607)      Fluid-filled distal esophagus.   The stomach is unremarkable without hernia.   No evidence of obstruction.      Stable appearance of an anterior pelvic abdominal wall fat-containing hernia with similar infiltration of the involved herniated fat.   Clinical correlation advised for focal pain and reducibility.      Enlarging central left hepatic lesion, not well evaluated on this noncontrast exam.   Interval decrease in size of a exophytic irregular lesion from the right kidney upper pole, possibly representing an involuting cyst component.   Grossly stable cystic pancreatic lesions as described, with large ill-defined lesion in the pancreatic head.      New enlarged abdominal nodes as described, measuring up to 3 cm.   New 5 mm left lower lobe pulmonary nodule.      Overall the above findings are  "consistent with worsening malignancy and considerably worsening metastatic disease.   Limit evaluation on this noncontrast exam.   Postcontrast MRI versus CT recommended for further evaluation.         The study was marked in EPIC for immediate notification.      Workstation performed: [id number]         XR chest 1 view   Final Result by Devyn Scanlon MD (07/22 1020)      Prominent left paramediastinal density which may be accentuated due to positioning. Known left thyroid goiter. Follow-up with be recommended.      The study was marked in EPIC for immediate notification.      Workstation performed: [id number]         CT head without contrast   Final Result by Laci Miller MD (07/22 1400)      No acute intracranial abnormality.                  Workstation performed: [id number]             Portions of the record may have been created with voice recognition software. Occasional wrong word or \"sound a like\" substitutions may have occurred due to the inherent limitations of voice recognition software. Read the chart carefully and recognize, using context, where substitutions have occurred.    "

## 2024-07-24 NOTE — CASE MANAGEMENT
Case Management Discharge Planning Note    Patient name Kristen Maguire  Location [address] [address]/[address] [address]-* MRN [id number]  : 1935 Date 2024       Current Admission Date: 2024  Current Admission Diagnosis:Generalized weakness   Patient Active Problem List    Diagnosis Date Noted Date Diagnosed    History of gout 2024     Hypokalemia 2024     Hyponatremia 2024     Abnormal CT of the abdomen 2024     Hypercalcemia 2024     Constipation 2024     Dysphagia 2024     Generalized weakness 10/26/2023     COVID-19 virus infection 10/26/2023     Leukocytosis 10/26/2023     Thrombocytosis 10/26/2023     Atrial fibrillation (HCC) 10/26/2023     GERD (gastroesophageal reflux disease) 10/26/2023     Psychiatric disorder 10/26/2023     History of asthma 10/26/2023       LOS (days): 2  Geometric Mean LOS (GMLOS) (days): 2.6  Days to GMLOS:0.8     OBJECTIVE:  Risk of Unplanned Readmission Score: 25.48     Current admission status: Inpatient   Preferred Pharmacy:   [facility] - [place], [place] - [address] [facility] [address]  Phone: [phone] Fax: [phone]    Primary Care Provider: John Nguyen DO    Primary Insurance: MEDICARE  Secondary Insurance: [facility]    DISCHARGE DETAILS:    Discharge planning discussed with:: Patient's Daughter Yomaira  Freedom of Choice: Yes     CM contacted family/caregiver?: Yes    Contacts  Patient Contacts: Yomaira-daughter  Contact Method: Phone  Phone Number: [phone]  Reason/Outcome: Continuity of Care, Discharge Planning    CM called and left message for patient's Daughter Yomaira to discuss accepting STR facilities and confirm patient/family facility choice. CM requested a return call.

## 2024-07-24 NOTE — ASSESSMENT & PLAN NOTE
CT imaging with evidence of a prior pelvic hernia, pancreatic head mass with left hepatic lesion, and a 5 mm left lower lobe pulmonary nodule  CA 19-9 tumor marker pending  Oncology consulted. Will need follow up with Oncology outpatient at [facility]

## 2024-07-24 NOTE — PROGRESS NOTES
[facility]  Progress Note  Name: Kristen Maguire I  MRN: [id number]  Unit/Bed#: [address] [address] I Date of Admission: 7/22/2024   Date of Service: 7/24/2024 I Hospital Day: 2    Assessment & Plan   * Hypercalcemia  Assessment & Plan  As evidenced by calcium 13.5 and ionized 1.69 on admission   Likely secondary to worsening malignancy as evidenced by findings on CT abdomen/pelvis which showed worsening malignancy and considerably worsening metastatic disease compared to CT abdomen/pelvis of 1/13/2024.  Nephrology consulted, recommendations are appreciated  Continue NS @ 125ml/hr  Calcitonin 100mg IM x 2 doses  Bumex 1mg IV BID  Monitor daily CMP, ionized calcium  PTH pending    Generalized weakness  Assessment & Plan  7/22 - presented to the ED with complaints of a few days of vomiting and poor oral intake. Reports abdominal pain and generalized weakness also for a few days.  Reports of more confusion than usual  Chest x-ray showed pulm left paramediastinal density which will be accentuated due to positioning and known left thyroid goiter  CT abdomen/pelvis without contrast demonstrated worsening malignancy and considerably worsening metastatic disease compared to CT abdomen/pelvis of 1/13/2024.  CT head without contrast with no evidence of acute intracranial abnormality  Low level ammonia at 15  Respiratory panel negative  UA still needs to be collected  Noted to have hypercalcemia, see treatment above  PT/OT eval and treat- recommending STR      Goals of care, counseling/discussion  Assessment & Plan  Discussed with patient findings on CT A/P in regards to her progression of cancer. Reports she follows with [facility] and was previously on therapies, but stopped due to multiple side effects. Patient at this time isn't interested in resuming chemo/radiation therapies. Discussed that her physical decline is due to her worsening cancer causing electrolyte abnormalities and weakness. Then  explained that if she doesn't want to pursue therapy for cancer, we should consider transition to palliative and hospice care. Discussed with patient's daughter and they are agreeable to a palliative care referral at discharge but would like to hold off on hospice for now.     Abnormal CT of the abdomen  Assessment & Plan  CT imaging with evidence of a prior pelvic hernia, pancreatic head mass with left hepatic lesion, and a 5 mm left lower lobe pulmonary nodule  CA 19-9 tumor marker pending  Oncology consulted. Will need follow up with Oncology outpatient at [facility]    Hyponatremia  Assessment & Plan  Serum sodium currently at 133  Likely secondary to decreased oral intake -> on IV fluids  Monitor serum sodium    History of gout  Assessment & Plan  Continue Allopurinol/Colchicine    Constipation  Assessment & Plan  CT abdomen/pelvis demonstrated large colonic stool burden.  Small bowel unremarkable without evidence of obstruction.  Optimize bowel regimen as necessary    Psychiatric disorder  Assessment & Plan  Continue Risperdal/Zoloft    GERD (gastroesophageal reflux disease)  Assessment & Plan  Continue Pepcid    Atrial fibrillation (HCC)  Assessment & Plan  Status post pacemaker  On Coreg for rate control  On Eliquis for anticoagulation    Thrombocytosis  Assessment & Plan  Likely reactive  Monitor platelet count    Leukocytosis  Assessment & Plan  Likely reactive due to acute medical issue(s) occluding a degree of hemoconcentration from hypercalcemia requiring IV fluids  Remains afebrile  Monitor WBC count    Hypokalemia-resolved as of 7/24/2024  Assessment & Plan  Monitor/replete serum potassium and magnesium as necessary  Resolved to 3.9 today               VTE Pharmacologic Prophylaxis:   Moderate Risk (Score 3-4) - Pharmacological DVT Prophylaxis Ordered: apixaban (Eliquis).    Mobility:   Basic Mobility Inpatient Raw Score: 12  -[facility] Goal: 4: Move to chair/commode  -[facility] Achieved: 4: Move to  chair/commode  [facility] Goal NOT achieved. Continue with multidisciplinary rounding and encourage appropriate mobility to improve upon [facility] goals.    Patient Centered Rounds: I performed bedside rounds with nursing staff today.   Discussions with Specialists or Other Care Team Provider: Nursing, Nephrology    Education and Discussions with Family / Patient: Updated  (daughter) via phone.    Total Time Spent on Date of Encounter in care of patient: 45 mins. This time was spent on one or more of the following: performing physical exam; counseling and coordination of care; obtaining or reviewing history; documenting in the medical record; reviewing/ordering tests, medications or procedures; communicating with other healthcare professionals and discussing with patient's family/caregivers.    Current Length of Stay: 2 day(s)  Current Patient Status: Inpatient   Certification Statement: The patient will continue to require additional inpatient hospital stay due to hypercalcemia   Discharge Plan: Anticipate discharge in 48-72 hrs to rehab facility.    Code Status: Level 1 - Full Code    Subjective:   Patient sitting upright in bed today. No new or worsening complaints.     Objective:     Vitals:   Temp (24hrs), Av °F (36.7 °C), Min:97.5 °F (36.4 °C), Max:98.3 °F (36.8 °C)    Temp:  [97.5 °F (36.4 °C)-98.3 °F (36.8 °C)] 97.5 °F (36.4 °C)  HR:  [56-60] 59  Resp:  [16-18] 16  BP: (110-120)/(53-60) 115/53  SpO2:  [91 %-95 %] 92 %  Body mass index is 36.15 kg/m².     Input and Output Summary (last 24 hours):     Intake/Output Summary (Last 24 hours) at 2024 1451  Last data filed at 2024 1603  Gross per 24 hour   Intake 1673.75 ml   Output --   Net 1673.75 ml       Physical Exam:   Physical Exam  Vitals and nursing note reviewed.   Constitutional:       General: She is not in acute distress.     Appearance: She is well-developed. She is ill-appearing.   HENT:      Head: Normocephalic and atraumatic.    Eyes:      Conjunctiva/sclera: Conjunctivae normal.   Cardiovascular:      Rate and Rhythm: Normal rate and regular rhythm.      Heart sounds: No murmur heard.  Pulmonary:      Effort: Pulmonary effort is normal. No respiratory distress.      Breath sounds: Normal breath sounds.   Abdominal:      Palpations: Abdomen is soft.      Tenderness: There is no abdominal tenderness.   Musculoskeletal:         General: No swelling.      Cervical back: Neck supple.   Skin:     General: Skin is warm and dry.      Capillary Refill: Capillary refill takes less than 2 seconds.   Neurological:      Mental Status: She is alert. Mental status is at baseline.   Psychiatric:         Mood and Affect: Mood normal.          Additional Data:     Labs:  Results from last 7 days   Lab Units 07/24/24  0543   WBC Thousand/uL 12.87*   HEMOGLOBIN g/dL 9.2*   HEMATOCRIT % 31.0*   PLATELETS Thousands/uL 418*   SEGS PCT % 76*   LYMPHO PCT % 11*   MONO PCT % 10   EOS PCT % 2     Results from last 7 days   Lab Units 07/24/24  0543   SODIUM mmol/L 133*   POTASSIUM mmol/L 3.9   CHLORIDE mmol/L 103   CO2 mmol/L 26   BUN mg/dL 12   CREATININE mg/dL 0.67   ANION GAP mmol/L 4   CALCIUM mg/dL 10.5*   ALBUMIN g/dL 2.8*   TOTAL BILIRUBIN mg/dL 0.45   ALK PHOS U/L 59   ALT U/L 5*   AST U/L 11*   GLUCOSE RANDOM mg/dL 91                       Lines/Drains:  Invasive Devices       Peripheral Intravenous Line  Duration             Peripheral IV 07/22/24 Right Antecubital 2 days    Peripheral IV 07/22/24 Right;Upper;Ventral (anterior) Arm 2 days                          Imaging: No pertinent imaging reviewed.    Recent Cultures (last 7 days):         Last 24 Hours Medication List:   Current Facility-Administered Medications   Medication Dose Route Frequency Provider Last Rate    acetaminophen  650 mg Oral Q6H PRN Olayide D HOPE Crockett      allopurinol  100 mg Oral Daily Olsriniide HOPE Rios      apixaban  5 mg Oral BID Kylieide D HOPE Crockett       bisacodyl  10 mg Rectal Daily PRN Olayide D Olaniyan, CRNP      bumetanide  1 mg Intravenous BID Joselyn Reyes Bahamonde, MD      calcitonin (salmon)  100 Units Intramuscular Daily Joselyn Reyes Bahamonde, MD      carvedilol  3.125 mg Oral BID With Meals Olayide D Olaniyan, CRNP      colchicine  0.6 mg Oral Daily Olayide D Olaniyan, CRNP      docusate sodium  100 mg Oral BID Olayide D Olaniyan, CRNP      famotidine  40 mg Oral Daily Olayide D Olaniyan, CRNP      montelukast  10 mg Oral QPM Olayide D Olaniyan, CRNP      ondansetron  4 mg Intravenous Q6H PRN Olayide D Olaniyan, CRNP      polyethylene glycol  17 g Oral Daily Olayide D Olaniyan, CRNP      risperiDONE  0.25 mg Oral Daily Olayide D Olaniyan, CRNP      senna  1 tablet Oral Daily Olayide D Olaniyan, CRNP      sertraline  25 mg Oral Daily Olayide D Olaniyan, CRNP      sodium chloride  125 mL/hr Intravenous Continuous Joselyn Reyes Bahamonde,  mL/hr (07/24/24 0404)        Today, Patient Was Seen By: Gunjan Fam PA-C    **Please Note: This note may have been constructed using a voice recognition system.**

## 2024-07-24 NOTE — ASSESSMENT & PLAN NOTE
CT imaging with evidence of a prior pelvic hernia, pancreatic head mass with left hepatic lesion, and a 5 mm left lower lobe pulmonary nodule  CA 19-9 tumor marker pending  Oncology consulted. Will need follow up with Oncology outpatient at [facility]

## 2024-07-24 NOTE — ACP (ADVANCE CARE PLANNING)
Advanced Care Planning Progress Note    Serious Illness Conversation    1. What is your understanding now of where you are with your illness?  Prognostic Understanding: appropriate understanding of prognosis     2. How much information about what is likely to be ahead with your illness would you like to have?  Information: patient wants to be fully informed     3. What did you (clinician) communicate to the patient?  Prognostic Communication: Uncertain - It can be difficult to predict what will happen with your illness. I hope you will continue to live well for a long time but I’m worried that you could get sick quickly, and I think it is important to prepare for that possibility.     4. If your health situation worsens, what are your most important goals?  Goals: be physically comfortable, be spiritually and emotionally at peace     5. What are the biggest fears and worries about the future and your health?  Fears/Worries: burdening others     6. What abilities are so critical to your life that you cannot imagine living without them?  Unacceptable Function: being in pain or very uncomfortable     7. What gives you strength as you think about the future with your illness?     8. If you become sicker, how much are you willing to go through for the possibility of gaining more time?  9. How much does your proxy and family know about your priorities and wishes?  Discussion Discussion: extensive discussion with family about goals and wishes        How does this plan sound to you? I will do everything I can to help you through this.  Patient wants to finish this discussion at a later time     I have spent 30 minutes speaking with my patient on advanced care planning today or during this visit     Advanced directives         Gunjan Fam PA-C

## 2024-07-24 NOTE — PLAN OF CARE
Problem: Prexisting or High Potential for Compromised Skin Integrity  Goal: Skin integrity is maintained or improved  Description: INTERVENTIONS:  - Identify patients at risk for skin breakdown  - Assess and monitor skin integrity  - Assess and monitor nutrition and hydration status  - Monitor labs   - Assess for incontinence   - Turn and reposition patient  - Assist with mobility/ambulation  - Relieve pressure over bony prominences  - Avoid friction and shearing  - Provide appropriate hygiene as needed including keeping skin clean and dry  - Evaluate need for skin moisturizer/barrier cream  - Collaborate with interdisciplinary team   - Patient/family teaching  - Consider wound care consult   7/24/2024 0420 by Dario Badillo RN  Outcome: Progressing  7/24/2024 0419 by Dario Badillo RN  Outcome: Progressing     Problem: GASTROINTESTINAL - ADULT  Goal: Minimal or absence of nausea and/or vomiting  Description: INTERVENTIONS:  - Administer IV fluids if ordered to ensure adequate hydration  - Maintain NPO status until nausea and vomiting are resolved  - Nasogastric tube if ordered  - Administer ordered antiemetic medications as needed  - Provide nonpharmacologic comfort measures as appropriate  - Advance diet as tolerated, if ordered  - Consider nutrition services referral to assist patient with adequate nutrition and appropriate food choices  7/24/2024 0420 by Dario Badillo RN  Outcome: Progressing  7/24/2024 0419 by Dario Badillo RN  Outcome: Progressing     Problem: GASTROINTESTINAL - ADULT  Goal: Maintains or returns to baseline bowel function  Description: INTERVENTIONS:  - Assess bowel function  - Encourage oral fluids to ensure adequate hydration  - Administer IV fluids if ordered to ensure adequate hydration  - Administer ordered medications as needed  - Encourage mobilization and activity  - Consider nutritional services referral to assist patient with adequate nutrition and appropriate food  choices  7/24/2024 0420 by Dario Badillo RN  Outcome: Progressing  7/24/2024 0419 by Dario Badillo RN  Outcome: Progressing     Problem: GASTROINTESTINAL - ADULT  Goal: Maintains adequate nutritional intake  Description: INTERVENTIONS:  - Monitor percentage of each meal consumed  - Identify factors contributing to decreased intake, treat as appropriate  - Assist with meals as needed  - Monitor I&O, weight, and lab values if indicated  - Obtain nutrition services referral as needed  7/24/2024 0420 by Dario Badillo RN  Outcome: Progressing  7/24/2024 0419 by Dario Badillo RN  Outcome: Progressing     Problem: MUSCULOSKELETAL - ADULT  Goal: Maintain or return mobility to safest level of function  Description: INTERVENTIONS:  - Assess patient's ability to carry out ADLs; assess patient's baseline for ADL function and identify physical deficits which impact ability to perform ADLs (bathing, care of mouth/teeth, toileting, grooming, dressing, etc.)  - Assess/evaluate cause of self-care deficits   - Assess range of motion  - Assess patient's mobility  - Assess patient's need for assistive devices and provide as appropriate  - Encourage maximum independence but intervene and supervise when necessary  - Involve family in performance of ADLs  - Assess for home care needs following discharge   - Consider OT consult to assist with ADL evaluation and planning for discharge  - Provide patient education as appropriate  Outcome: Progressing     Problem: PAIN - ADULT  Goal: Verbalizes/displays adequate comfort level or baseline comfort level  Description: Interventions:  - Encourage patient to monitor pain and request assistance  - Assess pain using appropriate pain scale  - Administer analgesics based on type and severity of pain and evaluate response  - Implement non-pharmacological measures as appropriate and evaluate response  - Consider cultural and social influences on pain and pain management  - Notify physician/advanced  practitioner if interventions unsuccessful or patient reports new pain  Outcome: Progressing

## 2024-07-24 NOTE — ASSESSMENT & PLAN NOTE
7/22 - presented to the ED with complaints of a few days of vomiting and poor oral intake. Reports abdominal pain and generalized weakness also for a few days.  Reports of more confusion than usual  Chest x-ray showed pulm left paramediastinal density which will be accentuated due to positioning and known left thyroid goiter  CT abdomen/pelvis without contrast demonstrated worsening malignancy and considerably worsening metastatic disease compared to CT abdomen/pelvis of 1/13/2024.  CT head without contrast with no evidence of acute intracranial abnormality  Low level ammonia at 15  Respiratory panel negative  UA still needs to be collected  Noted to have hypercalcemia, see treatment below  PT/OT eval and treat- recommending STR

## 2024-07-24 NOTE — ASSESSMENT & PLAN NOTE
7/22 - presented to the ED with complaints of a few days of vomiting and poor oral intake. Reports abdominal pain and generalized weakness also for a few days.  Reports of more confusion than usual  Chest x-ray showed pulm left paramediastinal density which will be accentuated due to positioning and known left thyroid goiter  CT abdomen/pelvis without contrast demonstrated worsening malignancy and considerably worsening metastatic disease compared to CT abdomen/pelvis of 1/13/2024.  CT head without contrast with no evidence of acute intracranial abnormality  Low level ammonia at 15  Respiratory panel negative  UA still needs to be collected, will order straight cath  Noted to have hypercalcemia, see treatment above  PT/OT eval and treat- recommending STR

## 2024-07-24 NOTE — ASSESSMENT & PLAN NOTE
Likely reactive due to acute medical issue(s)/cancer and degree of hemoconcentration from hypercalcemia requiring IV fluids  Remains afebrile  Monitor WBC count

## 2024-07-24 NOTE — ASSESSMENT & PLAN NOTE
Likely reactive due to acute medical issue(s) occluding a degree of hemoconcentration from hypercalcemia requiring IV fluids  Remains afebrile  Monitor WBC count

## 2024-07-24 NOTE — ASSESSMENT & PLAN NOTE
As evidenced by calcium 13.5 and ionized 1.69 on admission   Likely secondary to worsening malignancy as evidenced by findings on CT abdomen/pelvis which showed worsening malignancy and considerably worsening metastatic disease compared to CT abdomen/pelvis of 1/13/2024.  Nephrology consulted, recommendations are appreciated  Continue NS @ 125ml/hr  Calcitonin 100mg IM x 2 doses  Bumex 1mg IV BID  Monitor daily CMP, ionized calcium  PTH pending

## 2024-07-25 PROBLEM — F03.90 DEMENTIA (HCC): Status: ACTIVE | Noted: 2024-07-25

## 2024-07-25 LAB
ALBUMIN SERPL BCG-MCNC: 2.6 G/DL (ref 3.5–5)
ALP SERPL-CCNC: 57 U/L (ref 34–104)
ALT SERPL W P-5'-P-CCNC: 5 U/L (ref 7–52)
ANION GAP SERPL CALCULATED.3IONS-SCNC: 4 MMOL/L (ref 4–13)
AST SERPL W P-5'-P-CCNC: 11 U/L (ref 13–39)
BACTERIA UR QL AUTO: ABNORMAL /HPF
BASOPHILS # BLD AUTO: 0.08 THOUSANDS/ÂΜL (ref 0–0.1)
BASOPHILS NFR BLD AUTO: 1 % (ref 0–1)
BILIRUB SERPL-MCNC: 0.55 MG/DL (ref 0.2–1)
BILIRUB UR QL STRIP: NEGATIVE
BUN SERPL-MCNC: 10 MG/DL (ref 5–25)
CA-I BLD-SCNC: 1.41 MMOL/L (ref 1.12–1.32)
CALCIUM ALBUM COR SERPL-MCNC: 11.2 MG/DL (ref 8.3–10.1)
CALCIUM SERPL-MCNC: 10.1 MG/DL (ref 8.4–10.2)
CHLORIDE SERPL-SCNC: 104 MMOL/L (ref 96–108)
CLARITY UR: CLEAR
CO2 SERPL-SCNC: 25 MMOL/L (ref 21–32)
COLOR UR: COLORLESS
CREAT SERPL-MCNC: 0.62 MG/DL (ref 0.6–1.3)
EOSINOPHIL # BLD AUTO: 0.2 THOUSAND/ÂΜL (ref 0–0.61)
EOSINOPHIL NFR BLD AUTO: 1 % (ref 0–6)
ERYTHROCYTE [DISTWIDTH] IN BLOOD BY AUTOMATED COUNT: 17.8 % (ref 11.6–15.1)
GFR SERPL CREATININE-BSD FRML MDRD: 81 ML/MIN/1.73SQ M
GLUCOSE SERPL-MCNC: 91 MG/DL (ref 65–140)
GLUCOSE UR STRIP-MCNC: NEGATIVE MG/DL
HCT VFR BLD AUTO: 31.4 % (ref 34.8–46.1)
HCT VFR BLD AUTO: 32.4 % (ref 34.8–46.1)
HGB BLD-MCNC: 10 G/DL (ref 11.5–15.4)
HGB BLD-MCNC: 9.5 G/DL (ref 11.5–15.4)
HGB UR QL STRIP.AUTO: NEGATIVE
IMM GRANULOCYTES # BLD AUTO: 0.07 THOUSAND/UL (ref 0–0.2)
IMM GRANULOCYTES NFR BLD AUTO: 1 % (ref 0–2)
KETONES UR STRIP-MCNC: NEGATIVE MG/DL
LEUKOCYTE ESTERASE UR QL STRIP: ABNORMAL
LYMPHOCYTES # BLD AUTO: 1.78 THOUSANDS/ÂΜL (ref 0.6–4.47)
LYMPHOCYTES NFR BLD AUTO: 12 % (ref 14–44)
MCH RBC QN AUTO: 24.8 PG (ref 26.8–34.3)
MCHC RBC AUTO-ENTMCNC: 30.3 G/DL (ref 31.4–37.4)
MCV RBC AUTO: 82 FL (ref 82–98)
MONOCYTES # BLD AUTO: 1.49 THOUSAND/ÂΜL (ref 0.17–1.22)
MONOCYTES NFR BLD AUTO: 10 % (ref 4–12)
NEUTROPHILS # BLD AUTO: 11.15 THOUSANDS/ÂΜL (ref 1.85–7.62)
NEUTS SEG NFR BLD AUTO: 75 % (ref 43–75)
NITRITE UR QL STRIP: NEGATIVE
NON-SQ EPI CELLS URNS QL MICRO: ABNORMAL /HPF
NRBC BLD AUTO-RTO: 0 /100 WBCS
PH UR STRIP.AUTO: 6 [PH]
PLATELET # BLD AUTO: 417 THOUSANDS/UL (ref 149–390)
PMV BLD AUTO: 9.5 FL (ref 8.9–12.7)
POTASSIUM SERPL-SCNC: 3.3 MMOL/L (ref 3.5–5.3)
PROT SERPL-MCNC: 5.4 G/DL (ref 6.4–8.4)
PROT UR STRIP-MCNC: NEGATIVE MG/DL
PTH-INTACT SERPL-MCNC: 102.5 PG/ML (ref 12–88)
RBC # BLD AUTO: 3.83 MILLION/UL (ref 3.81–5.12)
RBC #/AREA URNS AUTO: ABNORMAL /HPF
SODIUM SERPL-SCNC: 133 MMOL/L (ref 135–147)
SP GR UR STRIP.AUTO: 1.01 (ref 1–1.03)
UROBILINOGEN UR STRIP-ACNC: <2 MG/DL
WBC # BLD AUTO: 14.77 THOUSAND/UL (ref 4.31–10.16)
WBC #/AREA URNS AUTO: ABNORMAL /HPF
WBC CLUMPS # UR AUTO: PRESENT /UL

## 2024-07-25 PROCEDURE — 81001 URINALYSIS AUTO W/SCOPE: CPT | Performed by: EMERGENCY MEDICINE

## 2024-07-25 PROCEDURE — 83970 ASSAY OF PARATHORMONE: CPT | Performed by: STUDENT IN AN ORGANIZED HEALTH CARE EDUCATION/TRAINING PROGRAM

## 2024-07-25 PROCEDURE — 99232 SBSQ HOSP IP/OBS MODERATE 35: CPT | Performed by: INTERNAL MEDICINE

## 2024-07-25 PROCEDURE — 85018 HEMOGLOBIN: CPT | Performed by: NURSE PRACTITIONER

## 2024-07-25 PROCEDURE — 99233 SBSQ HOSP IP/OBS HIGH 50: CPT | Performed by: STUDENT IN AN ORGANIZED HEALTH CARE EDUCATION/TRAINING PROGRAM

## 2024-07-25 PROCEDURE — 85025 COMPLETE CBC W/AUTO DIFF WBC: CPT | Performed by: INTERNAL MEDICINE

## 2024-07-25 PROCEDURE — 80053 COMPREHEN METABOLIC PANEL: CPT | Performed by: INTERNAL MEDICINE

## 2024-07-25 PROCEDURE — 92610 EVALUATE SWALLOWING FUNCTION: CPT

## 2024-07-25 PROCEDURE — 82330 ASSAY OF CALCIUM: CPT | Performed by: INTERNAL MEDICINE

## 2024-07-25 PROCEDURE — 85014 HEMATOCRIT: CPT | Performed by: NURSE PRACTITIONER

## 2024-07-25 RX ORDER — POTASSIUM CHLORIDE 20 MEQ/1
40 TABLET, EXTENDED RELEASE ORAL ONCE
Status: COMPLETED | OUTPATIENT
Start: 2024-07-25 | End: 2024-07-25

## 2024-07-25 RX ORDER — BUMETANIDE 0.25 MG/ML
1 INJECTION INTRAMUSCULAR; INTRAVENOUS 2 TIMES DAILY
Status: DISCONTINUED | OUTPATIENT
Start: 2024-07-25 | End: 2024-07-26

## 2024-07-25 RX ADMIN — SENNOSIDES 8.6 MG: 8.6 TABLET, FILM COATED ORAL at 10:53

## 2024-07-25 RX ADMIN — APIXABAN 5 MG: 5 TABLET, FILM COATED ORAL at 10:52

## 2024-07-25 RX ADMIN — MONTELUKAST 10 MG: 10 TABLET, FILM COATED ORAL at 17:09

## 2024-07-25 RX ADMIN — BUMETANIDE 1 MG: 0.25 INJECTION INTRAMUSCULAR; INTRAVENOUS at 19:29

## 2024-07-25 RX ADMIN — DOCUSATE SODIUM 100 MG: 100 CAPSULE, LIQUID FILLED ORAL at 10:52

## 2024-07-25 RX ADMIN — SODIUM CHLORIDE 100 ML/HR: 0.9 INJECTION, SOLUTION INTRAVENOUS at 22:22

## 2024-07-25 RX ADMIN — POTASSIUM CHLORIDE 40 MEQ: 1500 TABLET, EXTENDED RELEASE ORAL at 10:52

## 2024-07-25 RX ADMIN — PAMIDRONATE DISODIUM 30 MG: 3 INJECTION INTRAVENOUS at 10:55

## 2024-07-25 RX ADMIN — COLCHICINE 0.6 MG: 0.6 TABLET ORAL at 10:53

## 2024-07-25 RX ADMIN — ALLOPURINOL 100 MG: 100 TABLET ORAL at 10:53

## 2024-07-25 RX ADMIN — SERTRALINE HYDROCHLORIDE 25 MG: 25 TABLET ORAL at 10:53

## 2024-07-25 RX ADMIN — APIXABAN 5 MG: 5 TABLET, FILM COATED ORAL at 22:21

## 2024-07-25 RX ADMIN — FAMOTIDINE 40 MG: 20 TABLET, FILM COATED ORAL at 10:52

## 2024-07-25 RX ADMIN — RISPERIDONE 0.25 MG: 0.25 TABLET, FILM COATED ORAL at 10:52

## 2024-07-25 RX ADMIN — BUMETANIDE 1 MG: 0.25 INJECTION INTRAMUSCULAR; INTRAVENOUS at 10:53

## 2024-07-25 RX ADMIN — DOCUSATE SODIUM 100 MG: 100 CAPSULE, LIQUID FILLED ORAL at 17:09

## 2024-07-25 NOTE — PROGRESS NOTES
[facility]  Progress Note  Name: Kristen Maguire I  MRN: [id number]  Unit/Bed#: [address] [place] [address] I Date of Admission: 7/22/2024   Date of Service: 7/25/2024 I Hospital Day: 3    Assessment & Plan   * Hypercalcemia  Assessment & Plan  As evidenced by calcium 13.5 and ionized 1.69 on admission   Likely secondary to worsening malignancy as evidenced by findings on CT abdomen/pelvis which showed worsening malignancy and considerably worsening metastatic disease compared to CT abdomen/pelvis of 1/13/2024.  Nephrology consulted, recommendations are appreciated  Continue NS @ 100ml/hr  S/p Calcitonin 100mg IM x 2 doses  Added pamidronate 30mg IV x 1 dose  Bumex 1mg IV BID  Monitor daily CMP, ionized calcium  PTH pending    Generalized weakness  Assessment & Plan  7/22 - presented to the ED with complaints of a few days of vomiting and poor oral intake. Reports abdominal pain and generalized weakness also for a few days.  Reports of more confusion than usual  Chest x-ray showed pulm left paramediastinal density which will be accentuated due to positioning and known left thyroid goiter  CT abdomen/pelvis without contrast demonstrated worsening malignancy and considerably worsening metastatic disease compared to CT abdomen/pelvis of 1/13/2024.  CT head without contrast with no evidence of acute intracranial abnormality  Low level ammonia at 15  Respiratory panel negative  UA still needs to be collected, will order straight cath  Noted to have hypercalcemia, see treatment above  PT/OT eval and treat- recommending STR      Goals of care, counseling/discussion  Assessment & Plan  Discussed with patient findings on CT A/P in regards to her progression of cancer. Reports she follows with [facility] and was previously on therapies, but stopped due to multiple side effects. Patient at this time isn't interested in resuming chemo/radiation therapies. Discussed that her physical decline is due to her  worsening cancer causing electrolyte abnormalities and weakness. Then explained that if she doesn't want to pursue therapy for cancer, we should consider transition to palliative and hospice care. Discussed with patient's daughter she is now more open to the idea of hospice. She plans to talk to her brother and patient today about hospice and will give an answer tomorrow about possible plan for LTC with hospice at discharge.     Dementia (HCC)  Assessment & Plan  Mental status waxes and wanes per family  Supportive care  Delirium precautions    Abnormal CT of the abdomen  Assessment & Plan  CT imaging with evidence of a prior pelvic hernia, pancreatic head mass with left hepatic lesion, and a 5 mm left lower lobe pulmonary nodule  CA 19-9 tumor marker pending  Oncology consulted. Will need follow up with Oncology outpatient at [facility]    Hyponatremia  Assessment & Plan  Serum sodium currently at 133  Likely secondary to decreased oral intake -> on IV fluids  Monitor serum sodium    History of gout  Assessment & Plan  Continue Allopurinol/Colchicine    Constipation  Assessment & Plan  CT abdomen/pelvis demonstrated large colonic stool burden.  Small bowel unremarkable without evidence of obstruction.  Optimize bowel regimen as necessary    Psychiatric disorder  Assessment & Plan  Continue Risperdal/Zoloft    GERD (gastroesophageal reflux disease)  Assessment & Plan  Continue Pepcid    Atrial fibrillation (HCC)  Assessment & Plan  Status post pacemaker  On Coreg for rate control  On Eliquis for anticoagulation    Thrombocytosis  Assessment & Plan  Likely reactive  Monitor platelet count    Leukocytosis  Assessment & Plan  Likely reactive due to acute medical issue(s)/cancer and degree of hemoconcentration from hypercalcemia requiring IV fluids  Remains afebrile  Monitor WBC count    Hypokalemia-resolved as of 7/24/2024  Assessment & Plan  Monitor/replete serum potassium and magnesium as necessary  Resolved to 3.9  today               VTE Pharmacologic Prophylaxis:   Moderate Risk (Score 3-4) - Pharmacological DVT Prophylaxis Ordered: apixaban (Eliquis).    Mobility:   Basic Mobility Inpatient Raw Score: 12  JH-HLM Goal: 4: Move to chair/commode  JH-HLM Achieved: 1: Laying in bed  JH-HLM Goal NOT achieved. Continue with multidisciplinary rounding and encourage appropriate mobility to improve upon JH-HLM goals.    Patient Centered Rounds: I performed bedside rounds with nursing staff today.   Discussions with Specialists or Other Care Team Provider: Nursing, Nephrology    Education and Discussions with Family / Patient: Updated  (daughter) via phone.    Total Time Spent on Date of Encounter in care of patient: 45 mins. This time was spent on one or more of the following: performing physical exam; counseling and coordination of care; obtaining or reviewing history; documenting in the medical record; reviewing/ordering tests, medications or procedures; communicating with other healthcare professionals and discussing with patient's family/caregivers.    Current Length of Stay: 3 day(s)  Current Patient Status: Inpatient   Certification Statement: The patient will continue to require additional inpatient hospital stay due to hypercalcemia   Discharge Plan: Anticipate discharge in 48-72 hrs to rehab facility.    Code Status: Level 1 - Full Code    Subjective:   Patient pleasantly confused this morning. Sitting upright in bed eating soup. Denies any acute complaints    Objective:     Vitals:   Temp (24hrs), Av.5 °F (36.9 °C), Min:97.3 °F (36.3 °C), Max:99.3 °F (37.4 °C)    Temp:  [97.3 °F (36.3 °C)-99.3 °F (37.4 °C)] 98.8 °F (37.1 °C)  HR:  [60-65] 65  Resp:  [16-18] 18  BP: (106-124)/(46-90) 106/46  SpO2:  [90 %-96 %] 90 %  Body mass index is 36.15 kg/m².     Input and Output Summary (last 24 hours):     Intake/Output Summary (Last 24 hours) at 2024 1204  Last data filed at 2024 0601  Gross per 24 hour    Intake 2682.08 ml   Output 500 ml   Net 2182.08 ml       Physical Exam:   Physical Exam  Vitals and nursing note reviewed.   Constitutional:       General: She is not in acute distress.     Appearance: She is well-developed.   HENT:      Head: Normocephalic and atraumatic.   Eyes:      Conjunctiva/sclera: Conjunctivae normal.   Cardiovascular:      Rate and Rhythm: Normal rate and regular rhythm.      Heart sounds: No murmur heard.  Pulmonary:      Effort: Pulmonary effort is normal. No respiratory distress.      Breath sounds: Normal breath sounds.   Abdominal:      Palpations: Abdomen is soft.      Tenderness: There is no abdominal tenderness.   Musculoskeletal:         General: No swelling.      Cervical back: Neck supple.   Skin:     General: Skin is warm and dry.      Capillary Refill: Capillary refill takes less than 2 seconds.   Neurological:      Mental Status: She is alert. Mental status is at baseline.   Psychiatric:         Mood and Affect: Mood normal.          Additional Data:     Labs:  Results from last 7 days   Lab Units 07/25/24  0500   WBC Thousand/uL 14.77*   HEMOGLOBIN g/dL 9.5*   HEMATOCRIT % 31.4*   PLATELETS Thousands/uL 417*   SEGS PCT % 75   LYMPHO PCT % 12*   MONO PCT % 10   EOS PCT % 1     Results from last 7 days   Lab Units 07/25/24  0500   SODIUM mmol/L 133*   POTASSIUM mmol/L 3.3*   CHLORIDE mmol/L 104   CO2 mmol/L 25   BUN mg/dL 10   CREATININE mg/dL 0.62   ANION GAP mmol/L 4   CALCIUM mg/dL 10.1   ALBUMIN g/dL 2.6*   TOTAL BILIRUBIN mg/dL 0.55   ALK PHOS U/L 57   ALT U/L 5*   AST U/L 11*   GLUCOSE RANDOM mg/dL 91                       Lines/Drains:  Invasive Devices       Peripheral Intravenous Line  Duration             Peripheral IV 07/24/24 Dorsal (posterior);Left Hand 1 day              Drain  Duration             External Urinary Catheter <1 day                          Imaging: No pertinent imaging reviewed.    Recent Cultures (last 7 days):         Last 24 Hours Medication  List:   Current Facility-Administered Medications   Medication Dose Route Frequency Provider Last Rate    acetaminophen  650 mg Oral Q6H PRN Olayide D Olaniyan, CRNP      allopurinol  100 mg Oral Daily Olayide D Olaniyan, CRNP      apixaban  5 mg Oral BID Olayide D Olaniyan, CRNP      bisacodyl  10 mg Rectal Daily PRN Olayide D Olaniyan, CRNP      bumetanide  1 mg Intravenous BID Joselyn Reyes Bahamonde, MD      carvedilol  3.125 mg Oral BID With Meals Olayide D Olaniyan, CRNP      colchicine  0.6 mg Oral Daily Olayide D Olaniyan, CRNP      docusate sodium  100 mg Oral BID Olayide D Olaniyan, CRNP      famotidine  40 mg Oral Daily Olayide D Olaniyan, CRNP      montelukast  10 mg Oral QPM Olayide D Olaniyan, CRNP      ondansetron  4 mg Intravenous Q6H PRN Olayide D Olaniyan, CRNP      pamidronate (AREDIA) 30 mg in sodium chloride 0.9 % 250 mL IVPB  30 mg Intravenous Once Joselyn Reyes Bahamonde, MD 30 mg (07/25/24 1055)    polyethylene glycol  17 g Oral Daily Olayide D Olaniyan, CRNP      risperiDONE  0.25 mg Oral Daily Olayide D Olaniyan, CRNP      senna  1 tablet Oral Daily Olayide D Olaniyan, CRNP      sertraline  25 mg Oral Daily Olayide D Olaniyan, CRNP      sodium chloride  100 mL/hr Intravenous Continuous Joselyn Reyes Bahamonde,  mL/hr (07/25/24 0225)        Today, Patient Was Seen By: Gunjan Fam PA-C    **Please Note: This note may have been constructed using a voice recognition system.**

## 2024-07-25 NOTE — SPEECH THERAPY NOTE
Speech-Language Pathology Bedside Swallow Evaluation      Patient Name: Kristen Maguire    Today's Date: 7/25/2024     Problem List  Principal Problem:    Hypercalcemia  Active Problems:    Generalized weakness    Leukocytosis    Thrombocytosis    Atrial fibrillation (HCC)    GERD (gastroesophageal reflux disease)    Psychiatric disorder    Constipation    History of gout    Hyponatremia    Abnormal CT of the abdomen    Goals of care, counseling/discussion      Past Medical History  Past Medical History:   Diagnosis Date    Arthritis     Cardiac disease     Gout     Liver cancer (HCC)     Lyme disease     Pancreatic cancer (HCC)        Past Surgical History  Past Surgical History:   Procedure Laterality Date    CARDIAC PACEMAKER PLACEMENT      CHOLECYSTECTOMY      HYSTERECTOMY      JOINT REPLACEMENT         Summary   Pt presented with regurgitation as I entered room (bright yellow in color and pt had consumed yellow jello and broth prior to my arrival). 10 minutes after this episode, she requested water and applesauce.  She was positioned fully upright and fed slowly.  She presented with functional appearing oral and pharyngeal stage swallowing skills with 4 ozs thin liquid and 2 ozs applesauce.  She declined all solid food (I planned to offer 1-2 bites for assessment purposes only).   Aware of CT results (concern for worsening malignancy, plus fluid filled distal esophagus.  ?etiology, ?achalasia vs dysmotility vs reflux vs other)  ?if additional evaluation desired).     Risk/s for Aspiration: + risk for retrograde flow    Recommended Diet: PER MD (on clear liquid x3 days, ?appropriate for full liquid)  Recommended Form of Meds: in applesauce per pt request  REFLUX precautions: upright posture, slow rate of feeding, small bites/sips, small frequent feedings, avoid po intake ?2 hrs prior to lying down/sleeping, HOB 30 degrees  Other Recommendations: Consider GI evaluation (depending upon GOC), continue frequent oral  care        Current Medical Status  Kristen Maguire is an 89 yo F c PMH of GERD, asthma, atrial fibrillation, questionable pancreatic/liver cancer admitted 7/22 with c/o generalized weakness with nausea and vomiting for the past 4 to 5 days.  Patient reports she is nauseous and has been throwing up for the past 5 days and was unable to eat anything.  Denies any fever or chills.  Patient also reports some leg pain and back pain  In the ED patient's vital signs were stable.  Lab review showed sodium level of 133 with a calcium of 13.5 and a white count of 13, ionized calcium level of 1.69.  CT abdomen pelvis with fluid-filled distal esophagus with stable pelvic abdominal wall fat-containing hernia with enlarging left hepatic lesion with new enlarged abdominal nodes.  Primary diagnoses:  * Hypercalcemia  Likely secondary to worsening malignancy as evidenced by findings on CT abdomen/pelvis which showed worsening malignancy and considerably worsening metastatic disease compared to CT abdomen/pelvis of 1/13/2024.  Nephrology consulted, recommendations are appreciated  Continue NS @ 125ml/hr  Calcitonin 100mg IM x 2 doses  Bumex 1mg IV BID  Monitor daily CMP, ionized calcium  PTH pending     Generalized weakness, likely related to poor po intake.     CT of 7/22 (full summary):   Fluid-filled distal esophagus.  The stomach is unremarkable without hernia.  No evidence of obstruction.  Stable appearance of an anterior pelvic abdominal wall fat-containing hernia with similar infiltration of the involved herniated fat.  Clinical correlation advised for focal pain and reducibility.  Enlarging central left hepatic lesion, not well evaluated on this noncontrast exam.  Interval decrease in size of a exophytic irregular lesion from the right kidney upper pole, possibly representing an involuting cyst component.  Grossly stable cystic pancreatic lesions as described, with large ill-defined lesion in the pancreatic head.  New enlarged  "abdominal nodes as described, measuring up to 3 cm.  New 5 mm left lower lobe pulmonary nodule.  Overall the above findings are consistent with worsening malignancy and considerably worsening metastatic disease.  Limit evaluation on this noncontrast exam.  Postcontrast MRI versus CT recommended for further evaluation.    Note:  Pt has been on clear liquid diet and some report of dysphagia and regurgitation reported.  SLP Swallowing Evaluation ordered at this time.     Current Precautions:  Fall     Allergies: Soy/soybean (nausea), chocolate (constipation), peanut/peanut oil (hives)    Past medical history:  Please see H&P for details    Social/Education/Vocational Hx:  Unknown    Swallow Information   Current Symptoms/Concerns: poor po intake, regurgitation  Current Diet:  clear liquid    Baseline Diet: \"whatever I felt like but I don;t like sugar or anything sweet\"      Baseline Assessment   Behavior/Cognition: alert, O to self, grossly to place.   Unable to provide info re: where she lives.  Spoke of being a [employer] and \"I hope I can go to a Zoroastrian facility\"  Speech/Language Status: able to follow commands and express basic needs with no s/s dysarthria but limited content  Patient Positioning: upright in bed  Pain Status/Interventions/Response to Interventions:   No report of or nonverbal indications of pain.       Swallow Mechanism Exam  Facial: symmetrical  Labial: WFL  Lingual: WFL  Velum: symmetrical  Mandible: adequate ROM  Dentition: full dentition  Vocal quality:clear/adequate   Respiratory Status: on RA      Consistencies Assessed and Performance   Materials administered included 4 ozs water and 2 of applesauce    Oral Stage:   Bolus retrieval, formation and transfer appeared to be functional. No overt s/s reduced oral control.    Pharyngeal Stage: WFL suspected  Swallow Mechanics:  Swallowing initiation appeared prompt.  Laryngeal rise was palpated and judged to be within functional limits.  No coughing, " "throat clearing, change in vocal quality or respiratory status noted today.     Esophageal Concerns: \"fluid filled\" distal esophagus (?achalasia vs dysmotility vs reflux vs other)    Strategies and Efficacy: was fully upright and fed slowly    Summary and Recommendations (see above)    Results Reviewed with: patient and RN     Treatment Recommended: as medically and clinically indicated (cisco speak with Medical Team, ?further evaluation)  "

## 2024-07-25 NOTE — PLAN OF CARE
Problem: Prexisting or High Potential for Compromised Skin Integrity  Goal: Skin integrity is maintained or improved  Description: INTERVENTIONS:  - Identify patients at risk for skin breakdown  - Assess and monitor skin integrity  - Assess and monitor nutrition and hydration status  - Monitor labs   - Assess for incontinence   - Turn and reposition patient  - Assist with mobility/ambulation  - Relieve pressure over bony prominences  - Avoid friction and shearing  - Provide appropriate hygiene as needed including keeping skin clean and dry  - Evaluate need for skin moisturizer/barrier cream  - Collaborate with interdisciplinary team   - Patient/family teaching  - Consider wound care consult   Outcome: Progressing     Problem: GASTROINTESTINAL - ADULT  Goal: Minimal or absence of nausea and/or vomiting  Description: INTERVENTIONS:  - Administer IV fluids if ordered to ensure adequate hydration  - Maintain NPO status until nausea and vomiting are resolved  - Nasogastric tube if ordered  - Administer ordered antiemetic medications as needed  - Provide nonpharmacologic comfort measures as appropriate  - Advance diet as tolerated, if ordered  - Consider nutrition services referral to assist patient with adequate nutrition and appropriate food choices  Outcome: Progressing     Problem: GASTROINTESTINAL - ADULT  Goal: Maintains or returns to baseline bowel function  Description: INTERVENTIONS:  - Assess bowel function  - Encourage oral fluids to ensure adequate hydration  - Administer IV fluids if ordered to ensure adequate hydration  - Administer ordered medications as needed  - Encourage mobilization and activity  - Consider nutritional services referral to assist patient with adequate nutrition and appropriate food choices  Outcome: Progressing     Problem: GASTROINTESTINAL - ADULT  Goal: Maintains adequate nutritional intake  Description: INTERVENTIONS:  - Monitor percentage of each meal consumed  - Identify factors  contributing to decreased intake, treat as appropriate  - Assist with meals as needed  - Monitor I&O, weight, and lab values if indicated  - Obtain nutrition services referral as needed  Outcome: Progressing     Problem: GASTROINTESTINAL - ADULT  Goal: Oral mucous membranes remain intact  Description: INTERVENTIONS  - Assess oral mucosa and hygiene practices  - Implement preventative oral hygiene regimen  - Implement oral medicated treatments as ordered  - Initiate Nutrition services referral as needed  Outcome: Progressing     Problem: MUSCULOSKELETAL - ADULT  Goal: Maintain or return mobility to safest level of function  Description: INTERVENTIONS:  - Assess patient's ability to carry out ADLs; assess patient's baseline for ADL function and identify physical deficits which impact ability to perform ADLs (bathing, care of mouth/teeth, toileting, grooming, dressing, etc.)  - Assess/evaluate cause of self-care deficits   - Assess range of motion  - Assess patient's mobility  - Assess patient's need for assistive devices and provide as appropriate  - Encourage maximum independence but intervene and supervise when necessary  - Involve family in performance of ADLs  - Assess for home care needs following discharge   - Consider OT consult to assist with ADL evaluation and planning for discharge  - Provide patient education as appropriate  Outcome: Progressing  Goal: Maintain proper alignment of affected body part  Description: INTERVENTIONS:  - Support, maintain and protect limb and body alignment  - Provide patient/ family with appropriate education  Outcome: Progressing     Problem: NEUROSENSORY - ADULT  Goal: Achieves maximal functionality and self care  Description: INTERVENTIONS  - Monitor swallowing and airway patency with patient fatigue and changes in neurological status  - Encourage and assist patient to increase activity and self care.   - Encourage visually impaired, hearing impaired and aphasic patients to  use assistive/communication devices  Outcome: Progressing     Problem: GENITOURINARY - ADULT  Goal: Maintains or returns to baseline urinary function  Description: INTERVENTIONS:  - Assess urinary function  - Encourage oral fluids to ensure adequate hydration if ordered  - Administer IV fluids as ordered to ensure adequate hydration  - Administer ordered medications as needed  - Offer frequent toileting  - Follow urinary retention protocol if ordered  Outcome: Progressing  Goal: Absence of urinary retention  Description: INTERVENTIONS:  - Assess patient’s ability to void and empty bladder  - Monitor I/O  - Bladder scan as needed  - Discuss with physician/AP medications to alleviate retention as needed  - Discuss catheterization for long term situations as appropriate  Outcome: Progressing     Problem: METABOLIC, FLUID AND ELECTROLYTES - ADULT  Goal: Electrolytes maintained within normal limits  Description: INTERVENTIONS:  - Monitor labs and assess patient for signs and symptoms of electrolyte imbalances  - Administer electrolyte replacement as ordered  - Monitor response to electrolyte replacements, including repeat lab results as appropriate  - Instruct patient on fluid and nutrition as appropriate  Outcome: Progressing  Goal: Fluid balance maintained  Description: INTERVENTIONS:  - Monitor labs   - Monitor I/O and WT  - Instruct patient on fluid and nutrition as appropriate  - Assess for signs & symptoms of volume excess or deficit  Outcome: Progressing  Goal: Glucose maintained within target range  Description: INTERVENTIONS:  - Monitor Blood Glucose as ordered  - Assess for signs and symptoms of hyperglycemia and hypoglycemia  - Administer ordered medications to maintain glucose within target range  - Assess nutritional intake and initiate nutrition service referral as needed  Outcome: Progressing     Problem: SKIN/TISSUE INTEGRITY - ADULT  Goal: Skin Integrity remains intact(Skin Breakdown  Prevention)  Description: Assess:  -Perform Rudolph assessment every shift.  -Clean and moisturize skin every shift.  -Inspect skin when repositioning, toileting, and assisting with ADLS  -Assess extremities for adequate circulation and sensation     Bed Management:  -Have minimal linens on bed & keep smooth, unwrinkled  -Change linens as needed when moist or perspiring  -Avoid sitting or lying in one position for more than 2 hours while in bed  -Keep HOB at 45 degrees     Toileting:  -Offer bedside commode  -Assess for incontinence every shift.  -Use incontinent care products after each incontinent episode such as protective barrier    Activity:  -Mobilize patient 3 times a day  -Encourage activity and walks on unit  -Encourage or provide ROM exercises   -Turn and reposition patient every 2 Hours  -Use appropriate equipment to lift or move patient in bed  -Instruct/ Assist with weight shifting every 2 hours  when out of bed in chair  -Consider limitation of chair time 2 hour intervals    Skin Care:  -Avoid use of baby powder, tape, friction and shearing, hot water or constrictive clothing  -Relieve pressure over bony prominences using pillows.  -Do not massage red bony areas    Next Steps:  -Teach patient strategies to minimize risks such as off loading.    Outcome: Progressing     Problem: PAIN - ADULT  Goal: Verbalizes/displays adequate comfort level or baseline comfort level  Description: Interventions:  - Encourage patient to monitor pain and request assistance  - Assess pain using appropriate pain scale  - Administer analgesics based on type and severity of pain and evaluate response  - Implement non-pharmacological measures as appropriate and evaluate response  - Consider cultural and social influences on pain and pain management  - Notify physician/advanced practitioner if interventions unsuccessful or patient reports new pain  Outcome: Progressing     Problem: INFECTION - ADULT  Goal: Absence or prevention  of progression during hospitalization  Description: INTERVENTIONS:  - Assess and monitor for signs and symptoms of infection  - Monitor lab/diagnostic results  - Monitor all insertion sites, i.e. indwelling lines, tubes, and drains  - Monitor endotracheal if appropriate and nasal secretions for changes in amount and color  - [place] appropriate cooling/warming therapies per order  - Administer medications as ordered  - Instruct and encourage patient and family to use good hand hygiene technique  - Identify and instruct in appropriate isolation precautions for identified infection/condition  Outcome: Progressing  Goal: Absence of fever/infection during neutropenic period  Description: INTERVENTIONS:  - Monitor WBC    Outcome: Progressing     Problem: SAFETY ADULT  Goal: Patient will remain free of falls  Description: INTERVENTIONS:  - Educate patient/family on patient safety including physical limitations  - Instruct patient to call for assistance with activity   - Consult OT/PT to assist with strengthening/mobility   - Keep Call bell within reach  - Keep bed low and locked with side rails adjusted as appropriate  - Keep care items and personal belongings within reach  - Initiate and maintain comfort rounds  - Make Fall Risk Sign visible to staff  - Offer Toileting every 2 Hours, in advance of need  - Initiate/Maintain bed alarm  - Obtain necessary fall risk management equipment:   - Apply yellow socks and bracelet for high fall risk patients  - Consider moving patient to room near nurses station  Outcome: Progressing  Goal: Maintain or return to baseline ADL function  Description: INTERVENTIONS:  -  Assess patient's ability to carry out ADLs; assess patient's baseline for ADL function and identify physical deficits which impact ability to perform ADLs (bathing, care of mouth/teeth, toileting, grooming, dressing, etc.)  - Assess/evaluate cause of self-care deficits   - Assess range of motion  - Assess patient's  mobility; develop plan if impaired  - Assess patient's need for assistive devices and provide as appropriate  - Encourage maximum independence but intervene and supervise when necessary  - Involve family in performance of ADLs  - Assess for home care needs following discharge   - Consider OT consult to assist with ADL evaluation and planning for discharge  - Provide patient education as appropriate  Outcome: Progressing  Goal: Maintains/Returns to pre admission functional level  Description: INTERVENTIONS:  - Perform AM-PAC 6 Click Basic Mobility/ Daily Activity assessment daily.  - Set and communicate daily mobility goal to care team and patient/family/caregiver.   - Collaborate with rehabilitation services on mobility goals if consulted  - Perform Range of Motion 3 times a day.  - Reposition patient every 2 hours.  - Dangle patient 3 times a day  - Stand patient 3 times a day  - Ambulate patient 3 times a day  - Out of bed to chair 3 times a day   - Out of bed for meals 3 times a day  - Record patient progress and toleration of activity level   Outcome: Progressing     Problem: DISCHARGE PLANNING  Goal: Discharge to home or other facility with appropriate resources  Description: INTERVENTIONS:  - Identify barriers to discharge w/patient and caregiver  - Arrange for needed discharge resources and transportation as appropriate  - Identify discharge learning needs (meds, wound care, etc.)  - Arrange for interpretive services to assist at discharge as needed  - Refer to Case Management Department for coordinating discharge planning if the patient needs post-hospital services based on physician/advanced practitioner order or complex needs related to functional status, cognitive ability, or social support system  Outcome: Progressing     Problem: Knowledge Deficit  Goal: Patient/family/caregiver demonstrates understanding of disease process, treatment plan, medications, and discharge instructions  Description: Complete  learning assessment and assess knowledge base.  Interventions:  - Provide teaching at level of understanding  - Provide teaching via preferred learning methods  Outcome: Progressing

## 2024-07-25 NOTE — PLAN OF CARE
Problem: Prexisting or High Potential for Compromised Skin Integrity  Goal: Skin integrity is maintained or improved  Description: INTERVENTIONS:  - Identify patients at risk for skin breakdown  - Assess and monitor skin integrity  - Assess and monitor nutrition and hydration status  - Monitor labs   - Assess for incontinence   - Turn and reposition patient  - Assist with mobility/ambulation  - Relieve pressure over bony prominences  - Avoid friction and shearing  - Provide appropriate hygiene as needed including keeping skin clean and dry  - Evaluate need for skin moisturizer/barrier cream  - Collaborate with interdisciplinary team   - Patient/family teaching  - Consider wound care consult   Outcome: Progressing     Problem: GASTROINTESTINAL - ADULT  Goal: Minimal or absence of nausea and/or vomiting  Description: INTERVENTIONS:  - Administer IV fluids if ordered to ensure adequate hydration  - Maintain NPO status until nausea and vomiting are resolved  - Nasogastric tube if ordered  - Administer ordered antiemetic medications as needed  - Provide nonpharmacologic comfort measures as appropriate  - Advance diet as tolerated, if ordered  - Consider nutrition services referral to assist patient with adequate nutrition and appropriate food choices  Outcome: Progressing  Goal: Maintains or returns to baseline bowel function  Description: INTERVENTIONS:  - Assess bowel function  - Encourage oral fluids to ensure adequate hydration  - Administer IV fluids if ordered to ensure adequate hydration  - Administer ordered medications as needed  - Encourage mobilization and activity  - Consider nutritional services referral to assist patient with adequate nutrition and appropriate food choices  Outcome: Progressing  Goal: Maintains adequate nutritional intake  Description: INTERVENTIONS:  - Monitor percentage of each meal consumed  - Identify factors contributing to decreased intake, treat as appropriate  - Assist with meals  as needed  - Monitor I&O, weight, and lab values if indicated  - Obtain nutrition services referral as needed  Outcome: Progressing  Goal: Establish and maintain optimal ostomy function  Description: INTERVENTIONS:  - Assess bowel function  - Encourage oral fluids to ensure adequate hydration  - Administer IV fluids if ordered to ensure adequate hydration   - Administer ordered medications as needed  - Encourage mobilization and activity  - Nutrition services referral to assist patient with appropriate food choices  - Assess stoma site  - Consider wound care consult   Outcome: Progressing  Goal: Oral mucous membranes remain intact  Description: INTERVENTIONS  - Assess oral mucosa and hygiene practices  - Implement preventative oral hygiene regimen  - Implement oral medicated treatments as ordered  - Initiate Nutrition services referral as needed  Outcome: Progressing     Problem: MUSCULOSKELETAL - ADULT  Goal: Maintain or return mobility to safest level of function  Description: INTERVENTIONS:  - Assess patient's ability to carry out ADLs; assess patient's baseline for ADL function and identify physical deficits which impact ability to perform ADLs (bathing, care of mouth/teeth, toileting, grooming, dressing, etc.)  - Assess/evaluate cause of self-care deficits   - Assess range of motion  - Assess patient's mobility  - Assess patient's need for assistive devices and provide as appropriate  - Encourage maximum independence but intervene and supervise when necessary  - Involve family in performance of ADLs  - Assess for home care needs following discharge   - Consider OT consult to assist with ADL evaluation and planning for discharge  - Provide patient education as appropriate  Outcome: Progressing  Goal: Maintain proper alignment of affected body part  Description: INTERVENTIONS:  - Support, maintain and protect limb and body alignment  - Provide patient/ family with appropriate education  Outcome: Progressing      Problem: NEUROSENSORY - ADULT  Goal: Achieves maximal functionality and self care  Description: INTERVENTIONS  - Monitor swallowing and airway patency with patient fatigue and changes in neurological status  - Encourage and assist patient to increase activity and self care.   - Encourage visually impaired, hearing impaired and aphasic patients to use assistive/communication devices  Outcome: Progressing     Problem: GENITOURINARY - ADULT  Goal: Maintains or returns to baseline urinary function  Description: INTERVENTIONS:  - Assess urinary function  - Encourage oral fluids to ensure adequate hydration if ordered  - Administer IV fluids as ordered to ensure adequate hydration  - Administer ordered medications as needed  - Offer frequent toileting  - Follow urinary retention protocol if ordered  Outcome: Progressing  Goal: Absence of urinary retention  Description: INTERVENTIONS:  - Assess patient’s ability to void and empty bladder  - Monitor I/O  - Bladder scan as needed  - Discuss with physician/AP medications to alleviate retention as needed  - Discuss catheterization for long term situations as appropriate  Outcome: Progressing     Problem: METABOLIC, FLUID AND ELECTROLYTES - ADULT  Goal: Electrolytes maintained within normal limits  Description: INTERVENTIONS:  - Monitor labs and assess patient for signs and symptoms of electrolyte imbalances  - Administer electrolyte replacement as ordered  - Monitor response to electrolyte replacements, including repeat lab results as appropriate  - Instruct patient on fluid and nutrition as appropriate  Outcome: Progressing  Goal: Fluid balance maintained  Description: INTERVENTIONS:  - Monitor labs   - Monitor I/O and WT  - Instruct patient on fluid and nutrition as appropriate  - Assess for signs & symptoms of volume excess or deficit  Outcome: Progressing  Goal: Glucose maintained within target range  Description: INTERVENTIONS:  - Monitor Blood Glucose as ordered  -  Assess for signs and symptoms of hyperglycemia and hypoglycemia  - Administer ordered medications to maintain glucose within target range  - Assess nutritional intake and initiate nutrition service referral as needed  Outcome: Progressing     Problem: SKIN/TISSUE INTEGRITY - ADULT  Goal: Skin Integrity remains intact(Skin Breakdown Prevention)  Description: Assess:  -Perform Rudolph assessment every 2 hours  -Clean and moisturize skin every shift  -Inspect skin when repositioning, toileting, and assisting with ADLS  -Assess extremities for adequate circulation and sensation     Bed Management:  -Have minimal linens on bed & keep smooth, unwrinkled  -Change linens as needed when moist or perspiring  -Avoid sitting or lying in one position for more than 2 hours while in bed  -Keep HOB at 45 degrees     Toileting:  -Offer bedside commode  -Assess for incontinence every 2 hours      Activity:  -Mobilize patient 2 times a day  -Encourage activity and walks on unit  -Encourage or provide ROM exercises   -Turn and reposition patient every 2 Hours  -Use appropriate equipment to lift or move patient in bed  -Instruct/ Assist with weight shifting every 2 hours when out of bed in chair  -Consider limitation of chair time 2 hour intervals    Skin Care:  -Avoid use of baby powder, tape, friction and shearing, hot water or constrictive clothing  -Do not massage red bony areas    Outcome: Progressing     Problem: PAIN - ADULT  Goal: Verbalizes/displays adequate comfort level or baseline comfort level  Description: Interventions:  - Encourage patient to monitor pain and request assistance  - Assess pain using appropriate pain scale  - Administer analgesics based on type and severity of pain and evaluate response  - Implement non-pharmacological measures as appropriate and evaluate response  - Consider cultural and social influences on pain and pain management  - Notify physician/advanced practitioner if interventions unsuccessful or  patient reports new pain  Outcome: Progressing     Problem: INFECTION - ADULT  Goal: Absence or prevention of progression during hospitalization  Description: INTERVENTIONS:  - Assess and monitor for signs and symptoms of infection  - Monitor lab/diagnostic results  - Monitor all insertion sites, i.e. indwelling lines, tubes, and drains  - Monitor endotracheal if appropriate and nasal secretions for changes in amount and color  - [place] appropriate cooling/warming therapies per order  - Administer medications as ordered  - Instruct and encourage patient and family to use good hand hygiene technique  - Identify and instruct in appropriate isolation precautions for identified infection/condition  Outcome: Progressing  Goal: Absence of fever/infection during neutropenic period  Description: INTERVENTIONS:  - Monitor WBC    Outcome: Progressing     Problem: SAFETY ADULT  Goal: Patient will remain free of falls  Description: INTERVENTIONS:  - Educate patient/family on patient safety including physical limitations  - Instruct patient to call for assistance with activity   - Consult OT/PT to assist with strengthening/mobility   - Keep Call bell within reach  - Keep bed low and locked with side rails adjusted as appropriate  - Keep care items and personal belongings within reach  - Initiate and maintain comfort rounds  - Make Fall Risk Sign visible to staff  - Offer Toileting every 2 Hours, in advance of need  - Initiate/Maintain bed alarm  - Apply yellow socks and bracelet for high fall risk patients  - Consider moving patient to room near nurses station  Outcome: Progressing  Goal: Maintain or return to baseline ADL function  Description: INTERVENTIONS:  -  Assess patient's ability to carry out ADLs; assess patient's baseline for ADL function and identify physical deficits which impact ability to perform ADLs (bathing, care of mouth/teeth, toileting, grooming, dressing, etc.)  - Assess/evaluate cause of self-care  deficits   - Assess range of motion  - Assess patient's mobility; develop plan if impaired  - Assess patient's need for assistive devices and provide as appropriate  - Encourage maximum independence but intervene and supervise when necessary  - Involve family in performance of ADLs  - Assess for home care needs following discharge   - Consider OT consult to assist with ADL evaluation and planning for discharge  - Provide patient education as appropriate  Outcome: Progressing  Goal: Maintains/Returns to pre admission functional level  Description: INTERVENTIONS:  - Perform AM-PAC 6 Click Basic Mobility/ Daily Activity assessment daily.  - Set and communicate daily mobility goal to care team and patient/family/caregiver.   - Collaborate with rehabilitation services on mobility goals if consulted  - Perform Range of Motion 2 times a day.  - Reposition patient every 2 hours.  - Dangle patient 2 times a day  - Stand patient 2 times a day  - Ambulate patient 2 times a day  - Out of bed to chair 2 times a day   - Out of bed for meals 2 times a day  - Out of bed for toileting  - Record patient progress and toleration of activity level   Outcome: Progressing     Problem: DISCHARGE PLANNING  Goal: Discharge to home or other facility with appropriate resources  Description: INTERVENTIONS:  - Identify barriers to discharge w/patient and caregiver  - Arrange for needed discharge resources and transportation as appropriate  - Identify discharge learning needs (meds, wound care, etc.)  - Arrange for interpretive services to assist at discharge as needed  - Refer to Case Management Department for coordinating discharge planning if the patient needs post-hospital services based on physician/advanced practitioner order or complex needs related to functional status, cognitive ability, or social support system  Outcome: Progressing     Problem: Knowledge Deficit  Goal: Patient/family/caregiver demonstrates understanding of disease  process, treatment plan, medications, and discharge instructions  Description: Complete learning assessment and assess knowledge base.  Interventions:  - Provide teaching at level of understanding  - Provide teaching via preferred learning methods  Outcome: Progressing

## 2024-07-25 NOTE — PROGRESS NOTES
NEPHROLOGY PROGRESS NOTE   Kristen Maguire 88 y.o. female MRN: [id number]  Unit/Bed#: [address] [place] [address] Encounter: [id number]  Reason for Consult:     ASSESSMENT AND PLAN:  88 y.o.  woman with PMH of GERD, asthma, A-fib, pancreatic/liver cancer p/w weakness.  Nephrology is consulted for management of hypercalcemia.     PLAN        # Hypercalcemia  Most likely secondary to malignancy  Corrected calcium  11.2mg/dL no significant changes in the last 24h  Ionized calcium 1.4  Decrease NS to 100mL/h with Bumex 1 mg twice daily  Plan for Pamidronate monitor with ionized calcium   30mg in 4 h  Calcitonin  Recommend   Monitor urinary output     #Volume status/blood pressure :  Volume: Euvolemic  Blood pressure:tendency to hypotension /48 mmHg   Recommend:  Low-sodium diet  Bumex twice daily as above, paramters adjusted      # Hepatic/pancreatic lesion  Metastatic malignancy   Patient to pursue palliative care   Management as per primary team     # Exophytic right kidney cyst  Consider outpatient urology        The highlighted and/or bolded points in my assessment, plan, and disposition were discussed with the primary team and they agree with those points and the plan.  Previous records were personally reviewed by me to obtain a baseline creatinine.   The images (CXR) were personally reviewed by me in PACS      SUBJECTIVE:  Patient seen and examined at bedside. No chest pain, shortness of breath, nausea, vomiting, abdominal pain or diarrhea.     OBJECTIVE:  Current Weight: Weight - Scale: 95.5 kg (210 lb 9.6 oz)  Vitals:    07/24/24 2208   BP: (!) 107/48   Pulse: 62   Resp: 16   Temp: 98.6 °F (37 °C)   SpO2: 91%       Intake/Output Summary (Last 24 hours) at 7/25/2024 0750  Last data filed at 7/25/2024 0601  Gross per 24 hour   Intake 2682.08 ml   Output 500 ml   Net 2182.08 ml     Wt Readings from Last 3 Encounters:   07/22/24 95.5 kg (210 lb 9.6 oz)   04/12/24 94.8 kg (209 lb)   04/04/24 94.8 kg (209 lb)     Temp  Readings from Last 3 Encounters:   07/24/24 98.6 °F (37 °C)   04/12/24 (!) 97.4 °F (36.3 °C)   02/20/24 97.7 °F (36.5 °C) (Oral)     BP Readings from Last 3 Encounters:   07/24/24 (!) 107/48   04/12/24 122/58   04/04/24 131/71     Pulse Readings from Last 3 Encounters:   07/24/24 62   04/12/24 58   04/04/24 60      General:  no acute distress at this time  Skin:  No acute rash  Eyes:  No scleral icterus and noninjected  ENT:  mucous membranes moist  Neck:  no carotid bruits  Chest:  Clear to auscultation percussion, good respiratory effort, no use of accessory respiratory muscles  CVS:  Regular rate and rhythm without rub   Abdomen:  soft and nontender   Extremities: no significant lower extremity edema  Neuro:  No gross focality  Psych:  Alert , cooperative       Medications:    Current Facility-Administered Medications:     acetaminophen (TYLENOL) tablet 650 mg, 650 mg, Oral, Q6H PRN, Olayide D Olaniyan, CRNP    allopurinol (ZYLOPRIM) tablet 100 mg, 100 mg, Oral, Daily, Olayide D Olaniyan, CRNP, 100 mg at 07/24/24 0847    apixaban (ELIQUIS) tablet 5 mg, 5 mg, Oral, BID, Olayide D Olaniyan, CRNP, 5 mg at 07/24/24 2120    bisacodyl (DULCOLAX) rectal suppository 10 mg, 10 mg, Rectal, Daily PRN, Olayide D Olaniyan, CRNP    bumetanide (BUMEX) injection 1 mg, 1 mg, Intravenous, BID, Joselyn Reyes Bahamonde, MD    carvedilol (COREG) tablet 3.125 mg, 3.125 mg, Oral, BID With Meals, Olayide D Olaniyan, CRNP, 3.125 mg at 07/24/24 1733    colchicine (COLCRYS) tablet 0.6 mg, 0.6 mg, Oral, Daily, Olayide D Olaniyan, CRNP, 0.6 mg at 07/24/24 0847    docusate sodium (COLACE) capsule 100 mg, 100 mg, Oral, BID, Olayide D Olaniyan, CRNP, 100 mg at 07/24/24 1733    famotidine (PEPCID) tablet 40 mg, 40 mg, Oral, Daily, Olayide D NOHEMI CrockettNP, 40 mg at 07/24/24 0847    montelukast (SINGULAIR) tablet 10 mg, 10 mg, Oral, QPM, Olayide D HOPE Crockett, 10 mg at 07/24/24 1733    ondansetron (ZOFRAN) injection 4 mg, 4 mg, Intravenous,  Q6H PRN, Faith IRVING Olaniyan, CRNP, 4 mg at 07/23/24 0507    pamidronate (AREDIA) 30 mg in sodium chloride 0.9 % 250 mL IVPB, 30 mg, Intravenous, Once, Joselyn Reyes Bahamonde, MD    polyethylene glycol (MIRALAX) packet 17 g, 17 g, Oral, Daily, Olayide D Olaniyan, CRNP, 17 g at 07/24/24 0858    potassium chloride (Klor-Con M20) CR tablet 40 mEq, 40 mEq, Oral, Once, Joselyn Reyes Bahamonde, MD    risperiDONE (RisperDAL) tablet 0.25 mg, 0.25 mg, Oral, Daily, Olsriniide D Olaniyan, CRNP, 0.25 mg at 07/24/24 0848    senna (SENOKOT) tablet 8.6 mg, 1 tablet, Oral, Daily, Olsriniide D Olaniyan, CRNP, 8.6 mg at 07/24/24 0846    sertraline (ZOLOFT) tablet 25 mg, 25 mg, Oral, Daily, Olsriniide D Olaniyan, CRNP, 25 mg at 07/24/24 0848    sodium chloride 0.9 % infusion, 100 mL/hr, Intravenous, Continuous, Joselyn Reyes Bahamonde, MD, Last Rate: 125 mL/hr at 07/24/24 2234, 125 mL/hr at 07/24/24 2234    Laboratory Results:  Results from last 7 days   Lab Units 07/25/24  0500 07/24/24  0543 07/23/24  0503 07/22/24  1212   WBC Thousand/uL 14.77* 12.87* 13.82* 13.84*   HEMOGLOBIN g/dL 9.5* 9.2* 9.6* 11.0*   HEMATOCRIT % 31.4* 31.0* 31.8* 36.0   PLATELETS Thousands/uL 417* 418* 460* 498*   SODIUM mmol/L 133* 133* 131* 133*   POTASSIUM mmol/L 3.3* 3.9 3.2* 3.6   CHLORIDE mmol/L 104 103 100 98   CO2 mmol/L 25 26 24 27   BUN mg/dL 10 12 17 19   CREATININE mg/dL 0.62 0.67 0.78 0.89   CALCIUM mg/dL 10.1 10.5* 11.6* 13.5*   MAGNESIUM mg/dL  --   --  2.0 2.2       CT abdomen pelvis wo contrast   Final Result by Max Kaye MD (07/22 3367)      Fluid-filled distal esophagus.   The stomach is unremarkable without hernia.   No evidence of obstruction.      Stable appearance of an anterior pelvic abdominal wall fat-containing hernia with similar infiltration of the involved herniated fat.   Clinical correlation advised for focal pain and reducibility.      Enlarging central left hepatic lesion, not well evaluated on this noncontrast exam.  "  Interval decrease in size of a exophytic irregular lesion from the right kidney upper pole, possibly representing an involuting cyst component.   Grossly stable cystic pancreatic lesions as described, with large ill-defined lesion in the pancreatic head.      New enlarged abdominal nodes as described, measuring up to 3 cm.   New 5 mm left lower lobe pulmonary nodule.      Overall the above findings are consistent with worsening malignancy and considerably worsening metastatic disease.   Limit evaluation on this noncontrast exam.   Postcontrast MRI versus CT recommended for further evaluation.         The study was marked in EPIC for immediate notification.      Workstation performed: [id number]         XR chest 1 view   Final Result by Devyn Scanlon MD (07/22 0828)      Prominent left paramediastinal density which may be accentuated due to positioning. Known left thyroid goiter. Follow-up with be recommended.      The study was marked in EPIC for immediate notification.      Workstation performed: [id number]         CT head without contrast   Final Result by Laci Miller MD (07/22 1400)      No acute intracranial abnormality.                  Workstation performed: [id number]             Portions of the record may have been created with voice recognition software. Occasional wrong word or \"sound a like\" substitutions may have occurred due to the inherent limitations of voice recognition software. Read the chart carefully and recognize, using context, where substitutions have occurred.    "

## 2024-07-25 NOTE — PLAN OF CARE
Problem: Prexisting or High Potential for Compromised Skin Integrity  Goal: Skin integrity is maintained or improved  Description: INTERVENTIONS:  - Identify patients at risk for skin breakdown  - Assess and monitor skin integrity  - Assess and monitor nutrition and hydration status  - Monitor labs   - Assess for incontinence   - Turn and reposition patient  - Assist with mobility/ambulation  - Relieve pressure over bony prominences  - Avoid friction and shearing  - Provide appropriate hygiene as needed including keeping skin clean and dry  - Evaluate need for skin moisturizer/barrier cream  - Collaborate with interdisciplinary team   - Patient/family teaching  - Consider wound care consult   Outcome: Progressing     Problem: GASTROINTESTINAL - ADULT  Goal: Minimal or absence of nausea and/or vomiting  Description: INTERVENTIONS:  - Administer IV fluids if ordered to ensure adequate hydration  - Maintain NPO status until nausea and vomiting are resolved  - Nasogastric tube if ordered  - Administer ordered antiemetic medications as needed  - Provide nonpharmacologic comfort measures as appropriate  - Advance diet as tolerated, if ordered  - Consider nutrition services referral to assist patient with adequate nutrition and appropriate food choices  Outcome: Progressing  Goal: Maintains or returns to baseline bowel function  Description: INTERVENTIONS:  - Assess bowel function  - Encourage oral fluids to ensure adequate hydration  - Administer IV fluids if ordered to ensure adequate hydration  - Administer ordered medications as needed  - Encourage mobilization and activity  - Consider nutritional services referral to assist patient with adequate nutrition and appropriate food choices  Outcome: Progressing  Goal: Maintains adequate nutritional intake  Description: INTERVENTIONS:  - Monitor percentage of each meal consumed  - Identify factors contributing to decreased intake, treat as appropriate  - Assist with meals  as needed  - Monitor I&O, weight, and lab values if indicated  - Obtain nutrition services referral as needed  Outcome: Progressing  Goal: Establish and maintain optimal ostomy function  Description: INTERVENTIONS:  - Assess bowel function  - Encourage oral fluids to ensure adequate hydration  - Administer IV fluids if ordered to ensure adequate hydration   - Administer ordered medications as needed  - Encourage mobilization and activity  - Nutrition services referral to assist patient with appropriate food choices  - Assess stoma site  - Consider wound care consult   Outcome: Progressing  Goal: Oral mucous membranes remain intact  Description: INTERVENTIONS  - Assess oral mucosa and hygiene practices  - Implement preventative oral hygiene regimen  - Implement oral medicated treatments as ordered  - Initiate Nutrition services referral as needed  Outcome: Progressing     Problem: PAIN - ADULT  Goal: Verbalizes/displays adequate comfort level or baseline comfort level  Description: Interventions:  - Encourage patient to monitor pain and request assistance  - Assess pain using appropriate pain scale  - Administer analgesics based on type and severity of pain and evaluate response  - Implement non-pharmacological measures as appropriate and evaluate response  - Consider cultural and social influences on pain and pain management  - Notify physician/advanced practitioner if interventions unsuccessful or patient reports new pain  Outcome: Progressing     Problem: MUSCULOSKELETAL - ADULT  Goal: Maintain or return mobility to safest level of function  Description: INTERVENTIONS:  - Assess patient's ability to carry out ADLs; assess patient's baseline for ADL function and identify physical deficits which impact ability to perform ADLs (bathing, care of mouth/teeth, toileting, grooming, dressing, etc.)  - Assess/evaluate cause of self-care deficits   - Assess range of motion  - Assess patient's mobility  - Assess patient's  need for assistive devices and provide as appropriate  - Encourage maximum independence but intervene and supervise when necessary  - Involve family in performance of ADLs  - Assess for home care needs following discharge   - Consider OT consult to assist with ADL evaluation and planning for discharge  - Provide patient education as appropriate  Outcome: Progressing  Goal: Maintain proper alignment of affected body part  Description: INTERVENTIONS:  - Support, maintain and protect limb and body alignment  - Provide patient/ family with appropriate education  Outcome: Progressing     Problem: INFECTION - ADULT  Goal: Absence or prevention of progression during hospitalization  Description: INTERVENTIONS:  - Assess and monitor for signs and symptoms of infection  - Monitor lab/diagnostic results  - Monitor all insertion sites, i.e. indwelling lines, tubes, and drains  - Monitor endotracheal if appropriate and nasal secretions for changes in amount and color  - [place] appropriate cooling/warming therapies per order  - Administer medications as ordered  - Instruct and encourage patient and family to use good hand hygiene technique  - Identify and instruct in appropriate isolation precautions for identified infection/condition  Outcome: Progressing  Goal: Absence of fever/infection during neutropenic period  Description: INTERVENTIONS:  - Monitor WBC    Outcome: Progressing     Problem: SAFETY ADULT  Goal: Patient will remain free of falls  Description: INTERVENTIONS:  - Educate patient/family on patient safety including physical limitations  - Instruct patient to call for assistance with activity   - Consult OT/PT to assist with strengthening/mobility   - Keep Call bell within reach  - Keep bed low and locked with side rails adjusted as appropriate  - Keep care items and personal belongings within reach  - Initiate and maintain comfort rounds  - Make Fall Risk Sign visible to staff  - Offer Toileting every 2 Hours,  in advance of need  - Initiate/Maintain bed alarm  - Obtain necessary fall risk management equipment: non skid socks  - Apply yellow socks and bracelet for high fall risk patients  - Consider moving patient to room near nurses station  Outcome: Progressing  Goal: Maintain or return to baseline ADL function  Description: INTERVENTIONS:  -  Assess patient's ability to carry out ADLs; assess patient's baseline for ADL function and identify physical deficits which impact ability to perform ADLs (bathing, care of mouth/teeth, toileting, grooming, dressing, etc.)  - Assess/evaluate cause of self-care deficits   - Assess range of motion  - Assess patient's mobility; develop plan if impaired  - Assess patient's need for assistive devices and provide as appropriate  - Encourage maximum independence but intervene and supervise when necessary  - Involve family in performance of ADLs  - Assess for home care needs following discharge   - Consider OT consult to assist with ADL evaluation and planning for discharge  - Provide patient education as appropriate  Outcome: Progressing  Goal: Maintains/Returns to pre admission functional level  Description: INTERVENTIONS:  - Perform AM-PAC 6 Click Basic Mobility/ Daily Activity assessment daily.  - Set and communicate daily mobility goal to care team and patient/family/caregiver.   - Collaborate with rehabilitation services on mobility goals if consulted  - Perform Range of Motion 3 times a day.  - Reposition patient every 2 hours.  - Dangle patient 3 times a day  - Stand patient 3 times a day  - Ambulate patient 3 times a day  - Out of bed to chair 3 times a day   - Out of bed for meals 3 times a day  - Out of bed for toileting  - Record patient progress and toleration of activity level   Outcome: Progressing     Problem: DISCHARGE PLANNING  Goal: Discharge to home or other facility with appropriate resources  Description: INTERVENTIONS:  - Identify barriers to discharge w/patient and  caregiver  - Arrange for needed discharge resources and transportation as appropriate  - Identify discharge learning needs (meds, wound care, etc.)  - Arrange for interpretive services to assist at discharge as needed  - Refer to Case Management Department for coordinating discharge planning if the patient needs post-hospital services based on physician/advanced practitioner order or complex needs related to functional status, cognitive ability, or social support system  Outcome: Progressing     Problem: Knowledge Deficit  Goal: Patient/family/caregiver demonstrates understanding of disease process, treatment plan, medications, and discharge instructions  Description: Complete learning assessment and assess knowledge base.  Interventions:  - Provide teaching at level of understanding  - Provide teaching via preferred learning methods  Outcome: Progressing     Problem: NEUROSENSORY - ADULT  Goal: Achieves maximal functionality and self care  Description: INTERVENTIONS  - Monitor swallowing and airway patency with patient fatigue and changes in neurological status  - Encourage and assist patient to increase activity and self care.   - Encourage visually impaired, hearing impaired and aphasic patients to use assistive/communication devices  Outcome: Progressing     Problem: GENITOURINARY - ADULT  Goal: Maintains or returns to baseline urinary function  Description: INTERVENTIONS:  - Assess urinary function  - Encourage oral fluids to ensure adequate hydration if ordered  - Administer IV fluids as ordered to ensure adequate hydration  - Administer ordered medications as needed  - Offer frequent toileting  - Follow urinary retention protocol if ordered  Outcome: Progressing  Goal: Absence of urinary retention  Description: INTERVENTIONS:  - Assess patient’s ability to void and empty bladder  - Monitor I/O  - Bladder scan as needed  - Discuss with physician/AP medications to alleviate retention as needed  - Discuss  catheterization for long term situations as appropriate  Outcome: Progressing     Problem: METABOLIC, FLUID AND ELECTROLYTES - ADULT  Goal: Electrolytes maintained within normal limits  Description: INTERVENTIONS:  - Monitor labs and assess patient for signs and symptoms of electrolyte imbalances  - Administer electrolyte replacement as ordered  - Monitor response to electrolyte replacements, including repeat lab results as appropriate  - Instruct patient on fluid and nutrition as appropriate  Outcome: Progressing  Goal: Fluid balance maintained  Description: INTERVENTIONS:  - Monitor labs   - Monitor I/O and WT  - Instruct patient on fluid and nutrition as appropriate  - Assess for signs & symptoms of volume excess or deficit  Outcome: Progressing  Goal: Glucose maintained within target range  Description: INTERVENTIONS:  - Monitor Blood Glucose as ordered  - Assess for signs and symptoms of hyperglycemia and hypoglycemia  - Administer ordered medications to maintain glucose within target range  - Assess nutritional intake and initiate nutrition service referral as needed  Outcome: Progressing

## 2024-07-26 LAB
ALBUMIN SERPL BCG-MCNC: 2.5 G/DL (ref 3.5–5)
ALP SERPL-CCNC: 60 U/L (ref 34–104)
ALT SERPL W P-5'-P-CCNC: 6 U/L (ref 7–52)
ANION GAP SERPL CALCULATED.3IONS-SCNC: 4 MMOL/L (ref 4–13)
AST SERPL W P-5'-P-CCNC: 11 U/L (ref 13–39)
BILIRUB SERPL-MCNC: 0.53 MG/DL (ref 0.2–1)
BUN SERPL-MCNC: 10 MG/DL (ref 5–25)
CA-I BLD-SCNC: 1.36 MMOL/L (ref 1.12–1.32)
CALCIUM ALBUM COR SERPL-MCNC: 11 MG/DL (ref 8.3–10.1)
CALCIUM SERPL-MCNC: 9.8 MG/DL (ref 8.4–10.2)
CANCER AG19-9 SERPL-ACNC: 107 U/ML (ref 0–35)
CHLORIDE SERPL-SCNC: 104 MMOL/L (ref 96–108)
CO2 SERPL-SCNC: 26 MMOL/L (ref 21–32)
CREAT SERPL-MCNC: 0.66 MG/DL (ref 0.6–1.3)
ERYTHROCYTE [DISTWIDTH] IN BLOOD BY AUTOMATED COUNT: 17.7 % (ref 11.6–15.1)
GFR SERPL CREATININE-BSD FRML MDRD: 79 ML/MIN/1.73SQ M
GLUCOSE SERPL-MCNC: 92 MG/DL (ref 65–140)
HCT VFR BLD AUTO: 30.3 % (ref 34.8–46.1)
HGB BLD-MCNC: 9.1 G/DL (ref 11.5–15.4)
MCH RBC QN AUTO: 24.4 PG (ref 26.8–34.3)
MCHC RBC AUTO-ENTMCNC: 30 G/DL (ref 31.4–37.4)
MCV RBC AUTO: 81 FL (ref 82–98)
PLATELET # BLD AUTO: 395 THOUSANDS/UL (ref 149–390)
PMV BLD AUTO: 9.5 FL (ref 8.9–12.7)
POTASSIUM SERPL-SCNC: 2.9 MMOL/L (ref 3.5–5.3)
PROT SERPL-MCNC: 5.3 G/DL (ref 6.4–8.4)
RBC # BLD AUTO: 3.73 MILLION/UL (ref 3.81–5.12)
SODIUM SERPL-SCNC: 134 MMOL/L (ref 135–147)
WBC # BLD AUTO: 12.16 THOUSAND/UL (ref 4.31–10.16)

## 2024-07-26 PROCEDURE — 80053 COMPREHEN METABOLIC PANEL: CPT | Performed by: INTERNAL MEDICINE

## 2024-07-26 PROCEDURE — 99232 SBSQ HOSP IP/OBS MODERATE 35: CPT

## 2024-07-26 PROCEDURE — 99232 SBSQ HOSP IP/OBS MODERATE 35: CPT | Performed by: STUDENT IN AN ORGANIZED HEALTH CARE EDUCATION/TRAINING PROGRAM

## 2024-07-26 PROCEDURE — 97535 SELF CARE MNGMENT TRAINING: CPT

## 2024-07-26 PROCEDURE — 82330 ASSAY OF CALCIUM: CPT | Performed by: INTERNAL MEDICINE

## 2024-07-26 PROCEDURE — 85027 COMPLETE CBC AUTOMATED: CPT | Performed by: INTERNAL MEDICINE

## 2024-07-26 RX ORDER — POTASSIUM CHLORIDE 20 MEQ/1
20 TABLET, EXTENDED RELEASE ORAL ONCE
Status: COMPLETED | OUTPATIENT
Start: 2024-07-26 | End: 2024-07-26

## 2024-07-26 RX ORDER — POTASSIUM CHLORIDE 20 MEQ/1
40 TABLET, EXTENDED RELEASE ORAL ONCE
Status: COMPLETED | OUTPATIENT
Start: 2024-07-26 | End: 2024-07-26

## 2024-07-26 RX ADMIN — POTASSIUM CHLORIDE 20 MEQ: 1500 TABLET, EXTENDED RELEASE ORAL at 12:38

## 2024-07-26 RX ADMIN — SENNOSIDES 8.6 MG: 8.6 TABLET, FILM COATED ORAL at 08:59

## 2024-07-26 RX ADMIN — COLCHICINE 0.6 MG: 0.6 TABLET ORAL at 09:00

## 2024-07-26 RX ADMIN — CARVEDILOL 3.12 MG: 3.12 TABLET, FILM COATED ORAL at 17:36

## 2024-07-26 RX ADMIN — RISPERIDONE 0.25 MG: 0.25 TABLET, FILM COATED ORAL at 08:59

## 2024-07-26 RX ADMIN — APIXABAN 5 MG: 5 TABLET, FILM COATED ORAL at 09:14

## 2024-07-26 RX ADMIN — FAMOTIDINE 40 MG: 20 TABLET, FILM COATED ORAL at 08:59

## 2024-07-26 RX ADMIN — DOCUSATE SODIUM 100 MG: 100 CAPSULE, LIQUID FILLED ORAL at 17:36

## 2024-07-26 RX ADMIN — SERTRALINE HYDROCHLORIDE 25 MG: 25 TABLET ORAL at 09:00

## 2024-07-26 RX ADMIN — POTASSIUM CHLORIDE 40 MEQ: 1500 TABLET, EXTENDED RELEASE ORAL at 09:06

## 2024-07-26 RX ADMIN — MONTELUKAST 10 MG: 10 TABLET, FILM COATED ORAL at 17:36

## 2024-07-26 RX ADMIN — DOCUSATE SODIUM 100 MG: 100 CAPSULE, LIQUID FILLED ORAL at 09:00

## 2024-07-26 RX ADMIN — ALLOPURINOL 100 MG: 100 TABLET ORAL at 08:59

## 2024-07-26 RX ADMIN — APIXABAN 5 MG: 5 TABLET, FILM COATED ORAL at 20:43

## 2024-07-26 RX ADMIN — CARVEDILOL 3.12 MG: 3.12 TABLET, FILM COATED ORAL at 09:14

## 2024-07-26 NOTE — PLAN OF CARE
Problem: OCCUPATIONAL THERAPY ADULT  Goal: Performs self-care activities at highest level of function for planned discharge setting.  See evaluation for individualized goals.  Description: Treatment Interventions: ADL retraining, Functional transfer training, UE strengthening/ROM, Endurance training, Patient/family training, Equipment evaluation/education, Cognitive reorientation, Activityengagement, Compensatory technique education          See flowsheet documentation for full assessment, interventions and recommendations.   Outcome: Progressing  Note: Limitation: Decreased ADL status, Decreased Safe judgement during ADL, Decreased UE strength, Decreased cognition, Decreased endurance, Decreased high-level ADLs, Decreased self-care trans (decreased balance and mobility)  Prognosis: Good  Assessment: Pt seen for ADL training. Education and training with teachback method with pt regarding energy conservation/proper body mechanics during ADLS and functional mobility to decrease fall risks, decrease signs of fatigue and increase stamina needed to return to prior level of function. Pt incontinent of urine and bowel in bed. Cleaned and changed with assist of 2 people. Decreased assistance needed for bed mobility today, but increased assistance needed for sit to stand.  Unsupported sitting tolerance at edge of bed x 5 minutes with Jason. Pt demonstrating good verbal understanding of all information provided and all questions answered. Patient returned to bed with all needs within reach, SCD pumps, and bed alarm in place. Continue OT per POC.     Rehab Resource Intensity Level, OT: II (Moderate Resource Intensity)

## 2024-07-26 NOTE — PROGRESS NOTES
NEPHROLOGY PROGRESS NOTE   Kristen Maguire 88 y.o. female MRN: [id number]  Unit/Bed#: [address] [place] [address] Encounter: [id number]  Reason for Consult: Hypercalcemia    ASSESSMENT AND PLAN:  88 y.o.  woman with PMH of GERD, asthma, A-fib, pancreatic/liver cancer p/w weakness.  Nephrology is consulted for management of hypercalcemia.     PLAN        # Hypercalcemia  Most likely secondary to malignancy  Corrected calcium 11mg/dL   Ionized calcium 1.3  Discontinue IV fluids and Bumex   Monitor ionized calcium   Status post pamidronate on 7/25    Status post calcitonin   Recommend   Monitor urinary output  Enforce fluid intake     #Volume status/blood pressure :  Volume: Euvolemic on exam  Blood pressure: Normotensive, /58, goal less than 140/90  Recommend:  Low-sodium diet  Discontinue IV Bumex and fluids    # Hypokalemia  Serum potassium 2.9 mg/L  Etiology: Secondary to Bumex and poor intake  Potassium chloride 40, 20 p.o.     # Hepatic/pancreatic lesion  Metastatic malignancy   Patient to pursue palliative care, possible hospice  Management as per primary team     # Exophytic right kidney cyst  Consider outpatient urology        The highlighted and/or bolded points in my assessment, plan, and disposition were discussed with the primary team and they agree with those points and the plan.  Previous records were personally reviewed by me to obtain a baseline creatinine.   The images (CXR) were personally reviewed by me in PACS      SUBJECTIVE:  Patient seen and examined at bedside. No chest pain, shortness of breath, nausea, vomiting, abdominal pain or diarrhea.     OBJECTIVE:  Current Weight: Weight - Scale: 95.5 kg (210 lb 9.6 oz)  Vitals:    07/26/24 0001   BP: 115/58   Pulse: 62   Resp: 17   Temp: 98.4 °F (36.9 °C)   SpO2: 93%       Intake/Output Summary (Last 24 hours) at 7/26/2024 0649  Last data filed at 7/26/2024 0630  Gross per 24 hour   Intake 1421.33 ml   Output 1200 ml   Net 221.33 ml     Wt Readings from Last  3 Encounters:   07/22/24 95.5 kg (210 lb 9.6 oz)   04/12/24 94.8 kg (209 lb)   04/04/24 94.8 kg (209 lb)     Temp Readings from Last 3 Encounters:   07/26/24 98.4 °F (36.9 °C)   04/12/24 (!) 97.4 °F (36.3 °C)   02/20/24 97.7 °F (36.5 °C) (Oral)     BP Readings from Last 3 Encounters:   07/26/24 115/58   04/12/24 122/58   04/04/24 131/71     Pulse Readings from Last 3 Encounters:   07/26/24 62   04/12/24 58   04/04/24 60        General:  no acute distress at this time  Skin:  No acute rash  Eyes:  No scleral icterus and noninjected  ENT:  mucous membranes moist  Neck:  no carotid bruits  Chest:  Clear to auscultation percussion, good respiratory effort, no use of accessory respiratory muscles  CVS:  Regular rate and rhythm without rub   Abdomen:  soft and nontender   Extremities: no significant lower extremity edema  Neuro:  No gross focality  Psych:  Alert , cooperative       Medications:    Current Facility-Administered Medications:     acetaminophen (TYLENOL) tablet 650 mg, 650 mg, Oral, Q6H PRN, Olayide D Olaniyan, CRNP    allopurinol (ZYLOPRIM) tablet 100 mg, 100 mg, Oral, Daily, Olayide D Olaniyan, CRNP, 100 mg at 07/25/24 1053    apixaban (ELIQUIS) tablet 5 mg, 5 mg, Oral, BID, Olayide D Olaniyan, CRNP, 5 mg at 07/25/24 2221    bisacodyl (DULCOLAX) rectal suppository 10 mg, 10 mg, Rectal, Daily PRN, Olayide D Olaniyan, CRNP    carvedilol (COREG) tablet 3.125 mg, 3.125 mg, Oral, BID With Meals, Olayide D Olaniyan, CRNP, 3.125 mg at 07/24/24 1733    colchicine (COLCRYS) tablet 0.6 mg, 0.6 mg, Oral, Daily, Olayide D Olaniyan, CRNP, 0.6 mg at 07/25/24 1053    docusate sodium (COLACE) capsule 100 mg, 100 mg, Oral, BID, Olayide D Olaniyan, CRNP, 100 mg at 07/25/24 1709    famotidine (PEPCID) tablet 40 mg, 40 mg, Oral, Daily, Olayide D Olaniyan, CRNP, 40 mg at 07/25/24 1052    montelukast (SINGULAIR) tablet 10 mg, 10 mg, Oral, QPM, Olayide D Olaniyan, CRNP, 10 mg at 07/25/24 1709    ondansetron (ZOFRAN) injection 4  mg, 4 mg, Intravenous, Q6H PRN, Olayide D Olaniyan, CRNP, 4 mg at 07/23/24 0507    polyethylene glycol (MIRALAX) packet 17 g, 17 g, Oral, Daily, Olayide D Olaniyan, CRNP, 17 g at 07/24/24 0858    potassium chloride (Klor-Con M20) CR tablet 20 mEq, 20 mEq, Oral, Once, Joselyn Reyes Bahamonde, MD    potassium chloride (Klor-Con M20) CR tablet 40 mEq, 40 mEq, Oral, Once, Joselyn Reyes Bahamonde, MD    risperiDONE (RisperDAL) tablet 0.25 mg, 0.25 mg, Oral, Daily, Olayide D Olaniyan, CRNP, 0.25 mg at 07/25/24 1052    senna (SENOKOT) tablet 8.6 mg, 1 tablet, Oral, Daily, Olayide D Olaniyan, CRNP, 8.6 mg at 07/25/24 1053    sertraline (ZOLOFT) tablet 25 mg, 25 mg, Oral, Daily, Olayide D Olaniyan, CRNP, 25 mg at 07/25/24 1053    Laboratory Results:  Results from last 7 days   Lab Units 07/26/24  0529 07/25/24  2246 07/25/24  0500 07/24/24  0543 07/23/24  0503 07/22/24  1212   WBC Thousand/uL 12.16*  --  14.77* 12.87* 13.82* 13.84*   HEMOGLOBIN g/dL 9.1* 10.0* 9.5* 9.2* 9.6* 11.0*   HEMATOCRIT % 30.3* 32.4* 31.4* 31.0* 31.8* 36.0   PLATELETS Thousands/uL 395*  --  417* 418* 460* 498*   SODIUM mmol/L 134*  --  133* 133* 131* 133*   POTASSIUM mmol/L 2.9*  --  3.3* 3.9 3.2* 3.6   CHLORIDE mmol/L 104  --  104 103 100 98   CO2 mmol/L 26  --  25 26 24 27   BUN mg/dL 10  --  10 12 17 19   CREATININE mg/dL 0.66  --  0.62 0.67 0.78 0.89   CALCIUM mg/dL 9.8  --  10.1 10.5* 11.6* 13.5*   MAGNESIUM mg/dL  --   --   --   --  2.0 2.2       CT abdomen pelvis wo contrast   Final Result by Max Kaye MD (07/22 0137)      Fluid-filled distal esophagus.   The stomach is unremarkable without hernia.   No evidence of obstruction.      Stable appearance of an anterior pelvic abdominal wall fat-containing hernia with similar infiltration of the involved herniated fat.   Clinical correlation advised for focal pain and reducibility.      Enlarging central left hepatic lesion, not well evaluated on this noncontrast exam.   Interval  "decrease in size of a exophytic irregular lesion from the right kidney upper pole, possibly representing an involuting cyst component.   Grossly stable cystic pancreatic lesions as described, with large ill-defined lesion in the pancreatic head.      New enlarged abdominal nodes as described, measuring up to 3 cm.   New 5 mm left lower lobe pulmonary nodule.      Overall the above findings are consistent with worsening malignancy and considerably worsening metastatic disease.   Limit evaluation on this noncontrast exam.   Postcontrast MRI versus CT recommended for further evaluation.         The study was marked in EPIC for immediate notification.      Workstation performed: [id number]         XR chest 1 view   Final Result by Devyn Scanlon MD (07/22 0318)      Prominent left paramediastinal density which may be accentuated due to positioning. Known left thyroid goiter. Follow-up with be recommended.      The study was marked in EPIC for immediate notification.      Workstation performed: [id number]         CT head without contrast   Final Result by Laci Miller MD (07/22 1400)      No acute intracranial abnormality.                  Workstation performed: [id number]             Portions of the record may have been created with voice recognition software. Occasional wrong word or \"sound a like\" substitutions may have occurred due to the inherent limitations of voice recognition software. Read the chart carefully and recognize, using context, where substitutions have occurred.    "

## 2024-07-26 NOTE — PROGRESS NOTES
[facility]  Progress Note  Name: Kristen Maguire I  MRN: [id number]  Unit/Bed#: [address] [place] [address] Date of Admission: 7/22/2024   Date of Service: 7/26/2024 I Hospital Day: 4    Assessment & Plan   * Hypercalcemia  Assessment & Plan  As evidenced by calcium 13.5 and ionized 1.69 on admission   Likely secondary to worsening malignancy as evidenced by findings on CT abdomen/pelvis which showed worsening malignancy and considerably worsening metastatic disease compared to CT abdomen/pelvis of 1/13/2024.  Nephrology consulted, recommendations are appreciated  S/p Calcitonin 100mg IM x 2 doses  S/p pamidronate 30mg IV x 1 dose on 7/25  PTH elevated at 102.5  Spoke with nephrology who is recommending NM scan to rule out PTH adenoma -this can be done outpatient.  7/26 discontinued IVF and Bumex  Monitor daily CMP, ionized calcium  Ca has normalized on am labs      Dementia (HCC)  Assessment & Plan  Mental status waxes and wanes per family  Supportive care  Delirium precautions    Goals of care, counseling/discussion  Assessment & Plan  Discussed with patient findings on CT A/P in regards to her progression of cancer. Reports she follows with [facility] and was previously on therapies, but stopped due to multiple side effects. Patient at this time isn't interested in resuming chemo/radiation therapies. Discussed that her physical decline is due to her worsening cancer causing electrolyte abnormalities and weakness. Then explained that if she doesn't want to pursue therapy for cancer, we should consider transition to palliative and hospice care.   Previous provider discussed with patient's daughter the idea of hospice.  After further discussion with family today 7/26, family is not ready to transition to hospice at this time.  CM notified - STR to possible long term     Abnormal CT of the abdomen  Assessment & Plan  CT imaging with evidence of a prior pelvic hernia, pancreatic head mass with  left hepatic lesion, and a 5 mm left lower lobe pulmonary nodule  CA 19-9 tumor marker elevated at 107  Oncology consulted. Will need follow up with Oncology outpatient at [facility]    Hyponatremia  Assessment & Plan  Serum sodium currently at 133  Likely secondary to decreased oral intake   Monitor serum sodium  Stable     Hypokalemia  Assessment & Plan  2.9 on am labs  Potassium ordered   Continue to monitor and replete as needed      History of gout  Assessment & Plan  Continue Allopurinol/Colchicine    Constipation  Assessment & Plan  CT abdomen/pelvis demonstrated large colonic stool burden.  Small bowel unremarkable without evidence of obstruction.  Optimize bowel regimen as necessary    Psychiatric disorder  Assessment & Plan  Continue Risperdal/Zoloft    GERD (gastroesophageal reflux disease)  Assessment & Plan  Continue Pepcid    Atrial fibrillation (HCC)  Assessment & Plan  Status post pacemaker  On Coreg for rate control  On Eliquis for anticoagulation    Thrombocytosis  Assessment & Plan  Likely reactive  Monitor platelet count    Leukocytosis  Assessment & Plan  Likely reactive due to acute medical issue(s)/cancer and degree of hemoconcentration from hypercalcemia requiring IV fluids  Remains afebrile  Monitor WBC count    Generalized weakness  Assessment & Plan  7/22 - presented to the ED with complaints of a few days of vomiting and poor oral intake. Reports abdominal pain and generalized weakness also for a few days.  Reports of more confusion than usual  Chest x-ray showed pulm left paramediastinal density which will be accentuated due to positioning and known left thyroid goiter  CT abdomen/pelvis without contrast demonstrated worsening malignancy and considerably worsening metastatic disease compared to CT abdomen/pelvis of 1/13/2024.  CT head without contrast with no evidence of acute intracranial abnormality  Low level ammonia at 15  Respiratory panel negative  UA with leukocytes, nitrites  negative and she is asymptomatic   Noted to have hypercalcemia, see treatment above  PT/OT eval and treat- recommending STR           VTE Pharmacologic Prophylaxis:   Moderate Risk (Score 3-4) - Pharmacological DVT Prophylaxis Ordered: apixaban (Eliquis).    Mobility:   Basic Mobility Inpatient Raw Score: 8  -HLM Goal: 3: Sit at edge of bed  JH-HLM Achieved: 2: Bed activities/Dependent transfer  JH-HLM Goal NOT achieved. Continue with multidisciplinary rounding and encourage appropriate mobility to improve upon -HLM goals.    Patient Centered Rounds: I performed bedside rounds with nursing staff today.   Discussions with Specialists or Other Care Team Provider: CM, nephro    Education and Discussions with Family / Patient: Updated  (daughter) via phone.    Total Time Spent on Date of Encounter in care of patient: This time was spent on one or more of the following: performing physical exam; counseling and coordination of care; obtaining or reviewing history; documenting in the medical record; reviewing/ordering tests, medications or procedures; communicating with other healthcare professionals and discussing with patient's family/caregivers.    Current Length of Stay: 4 day(s)  Current Patient Status: Inpatient   Certification Statement: The patient will continue to require additional inpatient hospital stay due to continued electrolyte monitoring, oncology consult, and safe discharge planning  Discharge Plan: Anticipate discharge in 24-48 hrs to rehab facility.    Code Status: Level 1 - Full Code    Subjective:   Seen and examined resting comfortably at bedside.  No overnight events.  Patient states she is feeling about the same.  Did have some episodes of nausea/vomiting this morning.  Notes she is not eating much. Offers no additional complaints at this time.   Objective:     Vitals:   Temp (24hrs), Av.4 °F (36.9 °C), Min:98.1 °F (36.7 °C), Max:98.8 °F (37.1 °C)    Temp:  [98.1 °F (36.7  °C)-98.8 °F (37.1 °C)] 98.8 °F (37.1 °C)  HR:  [60-62] 60  Resp:  [16-17] 16  BP: (108-115)/(56-58) 113/57  SpO2:  [92 %-93 %] 92 %  Body mass index is 36.15 kg/m².     Input and Output Summary (last 24 hours):     Intake/Output Summary (Last 24 hours) at 7/26/2024 1334  Last data filed at 7/26/2024 0704  Gross per 24 hour   Intake 870 ml   Output 1200 ml   Net -330 ml       Physical Exam:   Physical Exam  Constitutional:       General: She is not in acute distress.  HENT:      Mouth/Throat:      Mouth: Mucous membranes are moist.   Eyes:      Conjunctiva/sclera: Conjunctivae normal.   Cardiovascular:      Heart sounds: Normal heart sounds.   Pulmonary:      Breath sounds: Normal breath sounds.   Musculoskeletal:      Right lower leg: No edema.      Left lower leg: No edema.   Skin:     General: Skin is warm and dry.   Neurological:      General: No focal deficit present.          Additional Data:     Labs:  Results from last 7 days   Lab Units 07/26/24  0529 07/25/24  2246 07/25/24  0500   WBC Thousand/uL 12.16*  --  14.77*   HEMOGLOBIN g/dL 9.1*   < > 9.5*   HEMATOCRIT % 30.3*   < > 31.4*   PLATELETS Thousands/uL 395*  --  417*   SEGS PCT %  --   --  75   LYMPHO PCT %  --   --  12*   MONO PCT %  --   --  10   EOS PCT %  --   --  1    < > = values in this interval not displayed.     Results from last 7 days   Lab Units 07/26/24  0529   SODIUM mmol/L 134*   POTASSIUM mmol/L 2.9*   CHLORIDE mmol/L 104   CO2 mmol/L 26   BUN mg/dL 10   CREATININE mg/dL 0.66   ANION GAP mmol/L 4   CALCIUM mg/dL 9.8   ALBUMIN g/dL 2.5*   TOTAL BILIRUBIN mg/dL 0.53   ALK PHOS U/L 60   ALT U/L 6*   AST U/L 11*   GLUCOSE RANDOM mg/dL 92                       Lines/Drains:  Invasive Devices       Peripheral Intravenous Line  Duration             Peripheral IV 07/24/24 Dorsal (posterior);Left Hand 2 days              Drain  Duration             External Urinary Catheter 1 day                          Imaging: Reviewed radiology reports from  this admission including: abdominal/pelvic CT    Recent Cultures (last 7 days):         Last 24 Hours Medication List:   Current Facility-Administered Medications   Medication Dose Route Frequency Provider Last Rate    acetaminophen  650 mg Oral Q6H PRN Olayide D Olaniyan, CRNP      allopurinol  100 mg Oral Daily Olayide D Olaniyan, CRNP      apixaban  5 mg Oral BID Olayide D Olaniyan, CRNP      bisacodyl  10 mg Rectal Daily PRN Olayide D Olaniyan, CRNP      carvedilol  3.125 mg Oral BID With Meals Olayide D Olaniyan, CRNP      colchicine  0.6 mg Oral Daily Olayide D Olaniyan, CRNP      docusate sodium  100 mg Oral BID Olayide D Olaniyan, CRNP      famotidine  40 mg Oral Daily Olayide D Olaniyan, CRNP      montelukast  10 mg Oral QPM Olayide D Olaniyan, CRNP      ondansetron  4 mg Intravenous Q6H PRN Olayide D Olaniyan, CRNP      polyethylene glycol  17 g Oral Daily Olayide D Olaniyan, CRNP      risperiDONE  0.25 mg Oral Daily Olayide D Olaniyan, CRNP      senna  1 tablet Oral Daily Olayide D Olaniyan, CRNP      sertraline  25 mg Oral Daily Olayide D Olaniyan, NOHEMINP          Today, Patient Was Seen By: Dulce Velasco PA-C    **Please Note: This note may have been constructed using a voice recognition system.**

## 2024-07-26 NOTE — PROGRESS NOTES
If patient cannot advance diet, may need to consider alternate means of nutrition if family would like to continue treatment and patient is unable to tolerate diet above Clear Liquids as this does not meet estimated nutritional needs   Speech Therapy    Patient not seen in therapy today.    Therapy is discontinued per physician request.      Additional details:  Orders received for bedside speech therapy due to stroke alert.  Spoke with RN Minerva who stated the patient passed the RN swallow screening.  CT of brain shows no acute infarct.  Discussed with Dr. Phillips via [employer], SLP will sign off.  Please re-consult if this service can further participate in the care of this patient.         OBJECTIVE                     Therapy procedure time and total treatment time can be found documented on the Time Entry flowsheet

## 2024-07-26 NOTE — PROGRESS NOTES
"   07/26/24 1200   Clinical Encounter Type   Visited With Patient and family together   Routine Visit Introduction   Referral From Physician   Mosque Encounters   Mosque Needs Prayer      introductory visit. Kristen expressed concerns, \"fear\" about being able to walk at rehab. Daughter Yomaira encouraged her to just decide she can do it and put her mind to it. I encouraged her to continue to express how her body and mind feel as she progresses. We prayed for strength and peace of mind, body and spirit.  remains available, she seemed to appreciate the visit.   "

## 2024-07-26 NOTE — ASSESSMENT & PLAN NOTE
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 Will need follow up with Oncology outpatient at [facility]

## 2024-07-26 NOTE — ASSESSMENT & PLAN NOTE
7/22 - presented to the ED with complaints of a few days of vomiting and poor oral intake. Reports abdominal pain and generalized weakness also for a few days.  Reports of more confusion than usual  Chest x-ray showed pulm left paramediastinal density which will be accentuated due to positioning and known left thyroid goiter  CT abdomen/pelvis without contrast demonstrated worsening malignancy and considerably worsening metastatic disease compared to CT abdomen/pelvis of 1/13/2024.  CT head without contrast with no evidence of acute intracranial abnormality  Low level ammonia at 15  Respiratory panel negative  UA with leukocytes, nitrites negative and she is asymptomatic   Noted to have hypercalcemia, see treatment above  PT/OT eval and treat- recommending STR

## 2024-07-26 NOTE — ASSESSMENT & PLAN NOTE
Discussed with patient findings on CT A/P in regards to her progression of cancer. Reports she follows with [facility] and was previously on therapies, but stopped due to multiple side effects. Patient at this time isn't interested in resuming chemo/radiation therapies. Discussed that her physical decline is due to her worsening cancer causing electrolyte abnormalities and weakness. Then explained that if she doesn't want to pursue therapy for cancer, we should consider transition to palliative and hospice care.   Previous provider discussed with patient's daughter the idea of hospice.  After further discussion with family today 7/26, family is not ready to transition to hospice at this time.  CM notified - STR to possible long term

## 2024-07-26 NOTE — ASSESSMENT & PLAN NOTE
Serum sodium currently at 133  Likely secondary to decreased oral intake   Monitor serum sodium  Stable

## 2024-07-26 NOTE — PLAN OF CARE
Problem: Prexisting or High Potential for Compromised Skin Integrity  Goal: Skin integrity is maintained or improved  Description: INTERVENTIONS:  - Identify patients at risk for skin breakdown  - Assess and monitor skin integrity  - Assess and monitor nutrition and hydration status  - Monitor labs   - Assess for incontinence   - Turn and reposition patient  - Assist with mobility/ambulation  - Relieve pressure over bony prominences  - Avoid friction and shearing  - Provide appropriate hygiene as needed including keeping skin clean and dry  - Evaluate need for skin moisturizer/barrier cream  - Collaborate with interdisciplinary team   - Patient/family teaching  - Consider wound care consult   Outcome: Progressing     Problem: GASTROINTESTINAL - ADULT  Goal: Minimal or absence of nausea and/or vomiting  Description: INTERVENTIONS:  - Administer IV fluids if ordered to ensure adequate hydration  - Maintain NPO status until nausea and vomiting are resolved  - Nasogastric tube if ordered  - Administer ordered antiemetic medications as needed  - Provide nonpharmacologic comfort measures as appropriate  - Advance diet as tolerated, if ordered  - Consider nutrition services referral to assist patient with adequate nutrition and appropriate food choices  Outcome: Progressing  Goal: Maintains or returns to baseline bowel function  Description: INTERVENTIONS:  - Assess bowel function  - Encourage oral fluids to ensure adequate hydration  - Administer IV fluids if ordered to ensure adequate hydration  - Administer ordered medications as needed  - Encourage mobilization and activity  - Consider nutritional services referral to assist patient with adequate nutrition and appropriate food choices  Outcome: Progressing  Goal: Maintains adequate nutritional intake  Description: INTERVENTIONS:  - Monitor percentage of each meal consumed  - Identify factors contributing to decreased intake, treat as appropriate  - Assist with meals  as needed  - Monitor I&O, weight, and lab values if indicated  - Obtain nutrition services referral as needed  Outcome: Progressing  Goal: Establish and maintain optimal ostomy function  Description: INTERVENTIONS:  - Assess bowel function  - Encourage oral fluids to ensure adequate hydration  - Administer IV fluids if ordered to ensure adequate hydration   - Administer ordered medications as needed  - Encourage mobilization and activity  - Nutrition services referral to assist patient with appropriate food choices  - Assess stoma site  - Consider wound care consult   Outcome: Progressing  Goal: Oral mucous membranes remain intact  Description: INTERVENTIONS  - Assess oral mucosa and hygiene practices  - Implement preventative oral hygiene regimen  - Implement oral medicated treatments as ordered  - Initiate Nutrition services referral as needed  Outcome: Progressing     Problem: PAIN - ADULT  Goal: Verbalizes/displays adequate comfort level or baseline comfort level  Description: Interventions:  - Encourage patient to monitor pain and request assistance  - Assess pain using appropriate pain scale  - Administer analgesics based on type and severity of pain and evaluate response  - Implement non-pharmacological measures as appropriate and evaluate response  - Consider cultural and social influences on pain and pain management  - Notify physician/advanced practitioner if interventions unsuccessful or patient reports new pain  Outcome: Progressing     Problem: MUSCULOSKELETAL - ADULT  Goal: Maintain or return mobility to safest level of function  Description: INTERVENTIONS:  - Assess patient's ability to carry out ADLs; assess patient's baseline for ADL function and identify physical deficits which impact ability to perform ADLs (bathing, care of mouth/teeth, toileting, grooming, dressing, etc.)  - Assess/evaluate cause of self-care deficits   - Assess range of motion  - Assess patient's mobility  - Assess patient's  need for assistive devices and provide as appropriate  - Encourage maximum independence but intervene and supervise when necessary  - Involve family in performance of ADLs  - Assess for home care needs following discharge   - Consider OT consult to assist with ADL evaluation and planning for discharge  - Provide patient education as appropriate  Outcome: Progressing  Goal: Maintain proper alignment of affected body part  Description: INTERVENTIONS:  - Support, maintain and protect limb and body alignment  - Provide patient/ family with appropriate education  Outcome: Progressing     Problem: INFECTION - ADULT  Goal: Absence or prevention of progression during hospitalization  Description: INTERVENTIONS:  - Assess and monitor for signs and symptoms of infection  - Monitor lab/diagnostic results  - Monitor all insertion sites, i.e. indwelling lines, tubes, and drains  - Monitor endotracheal if appropriate and nasal secretions for changes in amount and color  - [place] appropriate cooling/warming therapies per order  - Administer medications as ordered  - Instruct and encourage patient and family to use good hand hygiene technique  - Identify and instruct in appropriate isolation precautions for identified infection/condition  Outcome: Progressing  Goal: Absence of fever/infection during neutropenic period  Description: INTERVENTIONS:  - Monitor WBC    Outcome: Progressing     Problem: SAFETY ADULT  Goal: Patient will remain free of falls  Description: INTERVENTIONS:  - Educate patient/family on patient safety including physical limitations  - Instruct patient to call for assistance with activity   - Consult OT/PT to assist with strengthening/mobility   - Keep Call bell within reach  - Keep bed low and locked with side rails adjusted as appropriate  - Keep care items and personal belongings within reach  - Initiate and maintain comfort rounds  - Make Fall Risk Sign visible to staff  - Offer Toileting every *** Hours,  in advance of need  - Initiate/Maintain ***alarm  - Obtain necessary fall risk management equipment: ***  - Apply yellow socks and bracelet for high fall risk patients  - Consider moving patient to room near nurses station  Outcome: Progressing  Goal: Maintain or return to baseline ADL function  Description: INTERVENTIONS:  -  Assess patient's ability to carry out ADLs; assess patient's baseline for ADL function and identify physical deficits which impact ability to perform ADLs (bathing, care of mouth/teeth, toileting, grooming, dressing, etc.)  - Assess/evaluate cause of self-care deficits   - Assess range of motion  - Assess patient's mobility; develop plan if impaired  - Assess patient's need for assistive devices and provide as appropriate  - Encourage maximum independence but intervene and supervise when necessary  - Involve family in performance of ADLs  - Assess for home care needs following discharge   - Consider OT consult to assist with ADL evaluation and planning for discharge  - Provide patient education as appropriate  Outcome: Progressing  Goal: Maintains/Returns to pre admission functional level  Description: INTERVENTIONS:  - Perform AM-PAC 6 Click Basic Mobility/ Daily Activity assessment daily.  - Set and communicate daily mobility goal to care team and patient/family/caregiver.   - Collaborate with rehabilitation services on mobility goals if consulted  - Perform Range of Motion *** times a day.  - Reposition patient every *** hours.  - Dangle patient *** times a day  - Stand patient *** times a day  - Ambulate patient *** times a day  - Out of bed to chair *** times a day   - Out of bed for meals *** times a day  - Out of bed for toileting  - Record patient progress and toleration of activity level   Outcome: Progressing     Problem: DISCHARGE PLANNING  Goal: Discharge to home or other facility with appropriate resources  Description: INTERVENTIONS:  - Identify barriers to discharge w/patient  and caregiver  - Arrange for needed discharge resources and transportation as appropriate  - Identify discharge learning needs (meds, wound care, etc.)  - Arrange for interpretive services to assist at discharge as needed  - Refer to Case Management Department for coordinating discharge planning if the patient needs post-hospital services based on physician/advanced practitioner order or complex needs related to functional status, cognitive ability, or social support system  Outcome: Progressing     Problem: Knowledge Deficit  Goal: Patient/family/caregiver demonstrates understanding of disease process, treatment plan, medications, and discharge instructions  Description: Complete learning assessment and assess knowledge base.  Interventions:  - Provide teaching at level of understanding  - Provide teaching via preferred learning methods  Outcome: Progressing     Problem: NEUROSENSORY - ADULT  Goal: Achieves maximal functionality and self care  Description: INTERVENTIONS  - Monitor swallowing and airway patency with patient fatigue and changes in neurological status  - Encourage and assist patient to increase activity and self care.   - Encourage visually impaired, hearing impaired and aphasic patients to use assistive/communication devices  Outcome: Progressing     Problem: GENITOURINARY - ADULT  Goal: Maintains or returns to baseline urinary function  Description: INTERVENTIONS:  - Assess urinary function  - Encourage oral fluids to ensure adequate hydration if ordered  - Administer IV fluids as ordered to ensure adequate hydration  - Administer ordered medications as needed  - Offer frequent toileting  - Follow urinary retention protocol if ordered  Outcome: Progressing  Goal: Absence of urinary retention  Description: INTERVENTIONS:  - Assess patient’s ability to void and empty bladder  - Monitor I/O  - Bladder scan as needed  - Discuss with physician/AP medications to alleviate retention as needed  - Discuss  catheterization for long term situations as appropriate  Outcome: Progressing     Problem: METABOLIC, FLUID AND ELECTROLYTES - ADULT  Goal: Electrolytes maintained within normal limits  Description: INTERVENTIONS:  - Monitor labs and assess patient for signs and symptoms of electrolyte imbalances  - Administer electrolyte replacement as ordered  - Monitor response to electrolyte replacements, including repeat lab results as appropriate  - Instruct patient on fluid and nutrition as appropriate  Outcome: Progressing  Goal: Fluid balance maintained  Description: INTERVENTIONS:  - Monitor labs   - Monitor I/O and WT  - Instruct patient on fluid and nutrition as appropriate  - Assess for signs & symptoms of volume excess or deficit  Outcome: Progressing  Goal: Glucose maintained within target range  Description: INTERVENTIONS:  - Monitor Blood Glucose as ordered  - Assess for signs and symptoms of hyperglycemia and hypoglycemia  - Administer ordered medications to maintain glucose within target range  - Assess nutritional intake and initiate nutrition service referral as needed  Outcome: Progressing     Problem: SKIN/TISSUE INTEGRITY - ADULT  Goal: Skin Integrity remains intact(Skin Breakdown Prevention)  Description: Assess:  -Perform Rudolph assessment every ***  -Clean and moisturize skin every ***  -Inspect skin when repositioning, toileting, and assisting with ADLS  -Assess under medical devices such as *** every ***  -Assess extremities for adequate circulation and sensation     Bed Management:  -Have minimal linens on bed & keep smooth, unwrinkled  -Change linens as needed when moist or perspiring  -Avoid sitting or lying in one position for more than *** hours while in bed  -Keep HOB at ***degrees     Toileting:  -Offer bedside commode  -Assess for incontinence every ***  -Use incontinent care products after each incontinent episode such as ***    Activity:  -Mobilize patient *** times a day  -Encourage activity  and walks on unit  -Encourage or provide ROM exercises   -Turn and reposition patient every *** Hours  -Use appropriate equipment to lift or move patient in bed  -Instruct/ Assist with weight shifting every *** when out of bed in chair  -Consider limitation of chair time *** hour intervals    Skin Care:  -Avoid use of baby powder, tape, friction and shearing, hot water or constrictive clothing  -Relieve pressure over bony prominences using ***  -Do not massage red bony areas    Next Steps:  -Teach patient strategies to minimize risks such as ***   -Consider consults to  interdisciplinary teams such as ***  Outcome: Progressing

## 2024-07-26 NOTE — CASE MANAGEMENT
Case Management Discharge Planning Note    Patient name Kristen Maguire  Location [address] [address]/[address] [place] [address]-* MRN [id number]  : 1935 Date 2024       Current Admission Date: 2024  Current Admission Diagnosis:Hypercalcemia   Patient Active Problem List    Diagnosis Date Noted Date Diagnosed    Dementia (HCC) 2024     Goals of care, counseling/discussion 2024     History of gout 2024     Hypokalemia 2024     Hyponatremia 2024     Abnormal CT of the abdomen 2024     Hypercalcemia 2024     Constipation 2024     Dysphagia 2024     Generalized weakness 10/26/2023     COVID-19 virus infection 10/26/2023     Leukocytosis 10/26/2023     Thrombocytosis 10/26/2023     Atrial fibrillation (HCC) 10/26/2023     GERD (gastroesophageal reflux disease) 10/26/2023     Psychiatric disorder 10/26/2023     History of asthma 10/26/2023       LOS (days): 4  Geometric Mean LOS (GMLOS) (days): 2.6  Days to GMLOS:-1.3     OBJECTIVE:  Risk of Unplanned Readmission Score: 26.45         Current admission status: Inpatient   Preferred Pharmacy:   [facility] [address] [place] [address]  Phone: [phone] Fax: [phone]    Primary Care Provider: John Nguyen DO    Primary Insurance: MEDICARE  Secondary Insurance: [facility]    DISCHARGE DETAILS:    Additional Comments: CM spoke with pt's daughter, Yomaira, via phone call for continued discharge planning discussion. At this time family would like to pursue STR to possible LTC. Unsure of what STR they would like. STR pt choice list emailed to pt's daughter at email [email or web address]. CM to contact pt's daughter once she reviews options for pt choice.

## 2024-07-26 NOTE — OCCUPATIONAL THERAPY NOTE
"OT TREATMENT       07/26/24 1550   OT Last Visit   OT Visit Date 07/26/24   Note Type   Note Type Treatment   Pain Assessment   Pain Assessment Tool Valiente-Baker FACES   Valiente-Baker FACES Pain Rating 0   Restrictions/Precautions   Other Precautions Chair Alarm;Bed Alarm;Fall Risk;Pain   ADL   Where Assessed Supine, bed   Grooming Assistance 4  Minimal Assistance   Grooming Deficit Setup;Verbal cueing;Supervision/safety;Increased time to complete   UB Bathing Assistance 3  Moderate Assistance   UB Bathing Deficit Setup;Verbal cueing;Supervision/safety;Increased time to complete   LB Bathing Assistance 2  Maximal Assistance   LB Bathing Deficit Setup;Verbal cueing;Supervision/safety;Increased time to complete   UB Dressing Assistance 3  Moderate Assistance   UB Dressing Deficit Setup;Verbal cueing;Supervision/safety;Increased time to complete   LB Dressing Assistance 2  Maximal Assistance   LB Dressing Deficit Setup;Steadying;Verbal cueing;Supervision/safety;Increased time to complete   Toileting Assistance  1  Total Assistance   Toileting Deficit Use of bedpan/urinal setup;Bedside commode;Clothing management up;Clothing management down;Perineal hygiene;Verbal cueing;Increased time to complete   Bed Mobility   Rolling R 3  Moderate assistance   Additional items Assist x 1;Verbal cues;Bedrails   Rolling L 3  Moderate assistance   Additional items Assist x 1;Verbal cues;Bedrails   Supine to Sit 3  Moderate assistance   Additional items Assist x 1;Verbal cues;LE management;Increased time required   Sit to Supine 2  Maximal assistance   Additional items Assist x 1;Verbal cues;LE management;Increased time required   Transfers   Sit to Stand 3  Moderate assistance   Additional items Assist x 2;Verbal cues  (with RW)   Stand to Sit 3  Moderate assistance   Additional items Assist x 2;Verbal cues  (with RW)   Subjective   Subjective \"I would love to get up and move more.\"   Cognition   Overall Cognitive Status Impaired "   Arousal/Participation Alert;Cooperative   Attention Attends with cues to redirect   Orientation Level Oriented X4   Memory Decreased short term memory   Following Commands Follows one step commands with increased time or repetition   Comments decreased concentration, decreased safety awareness   Activity Tolerance   Activity Tolerance Patient limited by fatigue   Assessment   Assessment Pt seen for ADL training. Education and training with teachback method with pt regarding energy conservation/proper body mechanics during ADLS and functional mobility to decrease fall risks, decrease signs of fatigue and increase stamina needed to return to prior level of function. Pt incontinent of urine and bowel in bed. Cleaned and changed with assist of 2 people. Decreased assistance needed for bed mobility today, but increased assistance needed for sit to stand.  Unsupported sitting tolerance at edge of bed x 5 minutes with Jason. Pt demonstrating good verbal understanding of all information provided and all questions answered. Patient returned to bed with all needs within reach, SCD pumps, and bed alarm in place. Continue OT per POC.   Plan   Treatment Interventions ADL retraining;Functional transfer training;UE strengthening/ROM;Endurance training;Patient/family training;Equipment evaluation/education;Cognitive reorientation;Activityengagement;Compensatory technique education   OT Frequency 3-5x/wk   Discharge Recommendation   Rehab Resource Intensity Level, OT II (Moderate Resource Intensity)   AM-PAC Daily Activity Inpatient   Lower Body Dressing 2   Bathing 2   Toileting 1   Upper Body Dressing 2   Grooming 2   Eating 3   Daily Activity Raw Score 12   Daily Activity Standardized Score (Calc for Raw Score >=11) 30.6   AM-PAC Applied Cognition Inpatient   Following a Speech/Presentation 3   Understanding Ordinary Conversation 4   Taking Medications 3   Remembering Where Things Are Placed or Put Away 3   Remembering List of  4-5 Errands 2   Taking Care of Complicated Tasks 2   Applied Cognition Raw Score 17   Applied Cognition Standardized Score 36.52   End of Consult   Education Provided Yes   Patient Position at End of Consult Supine;Bed/Chair alarm activated;All needs within reach   Nurse Communication Nurse aware of consult   Licensure   [place] License Number  Natali Meyer MS, OTR/L, [place] Lic# [id number]

## 2024-07-26 NOTE — SPEECH THERAPY NOTE
Records reviewed and spoke briefly with RN.  Aware that hospice is now being considered.  Pt with limited appetite at this time.  She took sips of clear liquid and pills (in tsps of applesauce) with no reported s/s oropharyngeal dysphagia (but limited intake and pt with ongoing risk for regurgitation).  Continue as per MD & POA.  No additional SLP f/u indicated at this time.   Oralia Mike MS [facility]-SLP  [place] License [id number]

## 2024-07-26 NOTE — ASSESSMENT & PLAN NOTE
As evidenced by calcium 13.5 and ionized 1.69 on admission   Likely secondary to worsening malignancy as evidenced by findings on CT abdomen/pelvis which showed worsening malignancy and considerably worsening metastatic disease compared to CT abdomen/pelvis of 1/13/2024.  Nephrology consulted, recommendations are appreciated  S/p Calcitonin 100mg IM x 2 doses  S/p pamidronate 30mg IV x 1 dose on 7/25  PTH elevated at 102.5  Spoke with nephrology who is recommending NM scan to rule out PTH adenoma -this can be done outpatient.  7/26 discontinued IVF and Bumex  Monitor daily CMP, ionized calcium  Ca has normalized on am labs

## 2024-07-27 LAB
ANION GAP SERPL CALCULATED.3IONS-SCNC: 5 MMOL/L (ref 4–13)
BUN SERPL-MCNC: 10 MG/DL (ref 5–25)
CA-I BLD-SCNC: 1.29 MMOL/L (ref 1.12–1.32)
CALCIUM SERPL-MCNC: 9.4 MG/DL (ref 8.4–10.2)
CHLORIDE SERPL-SCNC: 103 MMOL/L (ref 96–108)
CO2 SERPL-SCNC: 24 MMOL/L (ref 21–32)
CREAT SERPL-MCNC: 0.65 MG/DL (ref 0.6–1.3)
ERYTHROCYTE [DISTWIDTH] IN BLOOD BY AUTOMATED COUNT: 17.5 % (ref 11.6–15.1)
GFR SERPL CREATININE-BSD FRML MDRD: 80 ML/MIN/1.73SQ M
GLUCOSE SERPL-MCNC: 93 MG/DL (ref 65–140)
GLUCOSE SERPL-MCNC: 95 MG/DL (ref 65–140)
HCT VFR BLD AUTO: 30.4 % (ref 34.8–46.1)
HGB BLD-MCNC: 9.6 G/DL (ref 11.5–15.4)
MCH RBC QN AUTO: 25.1 PG (ref 26.8–34.3)
MCHC RBC AUTO-ENTMCNC: 31.6 G/DL (ref 31.4–37.4)
MCV RBC AUTO: 80 FL (ref 82–98)
PLATELET # BLD AUTO: 419 THOUSANDS/UL (ref 149–390)
PMV BLD AUTO: 9.5 FL (ref 8.9–12.7)
POTASSIUM SERPL-SCNC: 3.6 MMOL/L (ref 3.5–5.3)
RBC # BLD AUTO: 3.82 MILLION/UL (ref 3.81–5.12)
SODIUM SERPL-SCNC: 132 MMOL/L (ref 135–147)
WBC # BLD AUTO: 13.27 THOUSAND/UL (ref 4.31–10.16)

## 2024-07-27 PROCEDURE — 99232 SBSQ HOSP IP/OBS MODERATE 35: CPT | Performed by: STUDENT IN AN ORGANIZED HEALTH CARE EDUCATION/TRAINING PROGRAM

## 2024-07-27 PROCEDURE — 80048 BASIC METABOLIC PNL TOTAL CA: CPT

## 2024-07-27 PROCEDURE — 82330 ASSAY OF CALCIUM: CPT

## 2024-07-27 PROCEDURE — 85027 COMPLETE CBC AUTOMATED: CPT

## 2024-07-27 PROCEDURE — 82948 REAGENT STRIP/BLOOD GLUCOSE: CPT

## 2024-07-27 RX ORDER — SODIUM CHLORIDE 1 G/1
1 TABLET ORAL DAILY
Status: DISCONTINUED | OUTPATIENT
Start: 2024-07-27 | End: 2024-07-29 | Stop reason: HOSPADM

## 2024-07-27 RX ORDER — POTASSIUM CHLORIDE 20 MEQ/1
40 TABLET, EXTENDED RELEASE ORAL ONCE
Status: COMPLETED | OUTPATIENT
Start: 2024-07-27 | End: 2024-07-27

## 2024-07-27 RX ADMIN — COLCHICINE 0.6 MG: 0.6 TABLET ORAL at 08:37

## 2024-07-27 RX ADMIN — CARVEDILOL 3.12 MG: 3.12 TABLET, FILM COATED ORAL at 08:39

## 2024-07-27 RX ADMIN — MONTELUKAST 10 MG: 10 TABLET, FILM COATED ORAL at 17:05

## 2024-07-27 RX ADMIN — FAMOTIDINE 40 MG: 20 TABLET, FILM COATED ORAL at 08:37

## 2024-07-27 RX ADMIN — DOCUSATE SODIUM 100 MG: 100 CAPSULE, LIQUID FILLED ORAL at 08:38

## 2024-07-27 RX ADMIN — CARVEDILOL 3.12 MG: 3.12 TABLET, FILM COATED ORAL at 17:05

## 2024-07-27 RX ADMIN — APIXABAN 5 MG: 5 TABLET, FILM COATED ORAL at 08:37

## 2024-07-27 RX ADMIN — SENNOSIDES 8.6 MG: 8.6 TABLET, FILM COATED ORAL at 08:39

## 2024-07-27 RX ADMIN — SERTRALINE HYDROCHLORIDE 25 MG: 25 TABLET ORAL at 08:39

## 2024-07-27 RX ADMIN — ALLOPURINOL 100 MG: 100 TABLET ORAL at 08:38

## 2024-07-27 RX ADMIN — DOCUSATE SODIUM 100 MG: 100 CAPSULE, LIQUID FILLED ORAL at 17:05

## 2024-07-27 RX ADMIN — APIXABAN 5 MG: 5 TABLET, FILM COATED ORAL at 21:09

## 2024-07-27 RX ADMIN — RISPERIDONE 0.25 MG: 0.25 TABLET, FILM COATED ORAL at 08:38

## 2024-07-27 RX ADMIN — POTASSIUM CHLORIDE 40 MEQ: 1500 TABLET, EXTENDED RELEASE ORAL at 06:23

## 2024-07-27 NOTE — ASSESSMENT & PLAN NOTE
Serum sodium currently at 129  Likely in the setting of poor solute intake vs SIADH in the setting of malignancy  Urine studies ordered  Started on plasma-lyte 75ml/hr for 5 hours  Continue sodium chloride 1g QD  Nephrology input appreciated

## 2024-07-27 NOTE — ASSESSMENT & PLAN NOTE
7/22 - presented to the ED with complaints of a few days of vomiting and poor oral intake. Reports abdominal pain and generalized weakness also for a few days.  Reports of more confusion than usual  Chest x-ray showed pulm left paramediastinal density which will be accentuated due to positioning and known left thyroid goiter  CT abdomen/pelvis without contrast demonstrated worsening malignancy and considerably worsening metastatic disease compared to CT abdomen/pelvis of 1/13/2024.  CT head without contrast with no evidence of acute intracranial abnormality  Low level ammonia at 15  Respiratory panel negative  UA with leukocytes, nitrites negative and she is asymptomatic   Noted to have hypercalcemia, see treatment above  PT/OT eval and treat- [facility]ed to [facility]. Will not be able to take patient until Monday 7/29

## 2024-07-27 NOTE — ASSESSMENT & PLAN NOTE
As evidenced by calcium 13.5 and ionized 1.69 on admission   Likely secondary to worsening malignancy as evidenced by findings on CT abdomen/pelvis which showed worsening malignancy and considerably worsening metastatic disease compared to CT abdomen/pelvis of 1/13/2024.  Nephrology consulted, recommendations are appreciated  S/p Calcitonin 100mg IM x 2 doses  S/p pamidronate 30mg IV x 1 dose on 7/25  PTH elevated at 102.5  Spoke with nephrology who is recommending NM scan to rule out PTH adenoma -this can be done outpatient.  7/26 discontinued IVF and Bumex  Calcium improved. Monitor daily CMP, ionized calcium

## 2024-07-27 NOTE — CASE MANAGEMENT
Case Management Discharge Planning Note    Patient name Kristen Maguire  Location [address] [address]/[address] [address]-* MRN [id number]  : 1935 Date 2024       Current Admission Date: 2024  Current Admission Diagnosis:Hypercalcemia   Patient Active Problem List    Diagnosis Date Noted Date Diagnosed    Dementia (HCC) 2024     Goals of care, counseling/discussion 2024     History of gout 2024     Hypokalemia 2024     Hyponatremia 2024     Abnormal CT of the abdomen 2024     Hypercalcemia 2024     Constipation 2024     Dysphagia 2024     Generalized weakness 10/26/2023     COVID-19 virus infection 10/26/2023     Leukocytosis 10/26/2023     Thrombocytosis 10/26/2023     Atrial fibrillation (HCC) 10/26/2023     GERD (gastroesophageal reflux disease) 10/26/2023     Psychiatric disorder 10/26/2023     History of asthma 10/26/2023       LOS (days): 5  Geometric Mean LOS (GMLOS) (days): 2.6  Days to GMLOS:-2.2     OBJECTIVE:  Risk of Unplanned Readmission Score: 26.79         Current admission status: Inpatient   Preferred Pharmacy:   [facility] [address] [place] [address]  Phone: [phone] Fax: [phone]    Primary Care Provider: John Nguyen DO    Primary Insurance: MEDICARE  Secondary Insurance: [facility]    DISCHARGE DETAILS:  Additional Comments: CM contacted pt's daughter, Yomaira, via phone call for continued discharge planning discussion. CM reviewed STR pt choice list at this time and pt's family selected [facility]. CM contacted [facility] and was informed their admissions director, Laura, does not work on the weekends and they are unable to admit pt until Monday.

## 2024-07-27 NOTE — PLAN OF CARE
Problem: Prexisting or High Potential for Compromised Skin Integrity  Goal: Skin integrity is maintained or improved  Description: INTERVENTIONS:  - Identify patients at risk for skin breakdown  - Assess and monitor skin integrity  - Assess and monitor nutrition and hydration status  - Monitor labs   - Assess for incontinence   - Turn and reposition patient  - Assist with mobility/ambulation  - Relieve pressure over bony prominences  - Avoid friction and shearing  - Provide appropriate hygiene as needed including keeping skin clean and dry  - Evaluate need for skin moisturizer/barrier cream  - Collaborate with interdisciplinary team   - Patient/family teaching  - Consider wound care consult   Outcome: Progressing     Problem: GASTROINTESTINAL - ADULT  Goal: Minimal or absence of nausea and/or vomiting  Description: INTERVENTIONS:  - Administer IV fluids if ordered to ensure adequate hydration  - Maintain NPO status until nausea and vomiting are resolved  - Nasogastric tube if ordered  - Administer ordered antiemetic medications as needed  - Provide nonpharmacologic comfort measures as appropriate  - Advance diet as tolerated, if ordered  - Consider nutrition services referral to assist patient with adequate nutrition and appropriate food choices  Outcome: Progressing  Goal: Maintains or returns to baseline bowel function  Description: INTERVENTIONS:  - Assess bowel function  - Encourage oral fluids to ensure adequate hydration  - Administer IV fluids if ordered to ensure adequate hydration  - Administer ordered medications as needed  - Encourage mobilization and activity  - Consider nutritional services referral to assist patient with adequate nutrition and appropriate food choices  Outcome: Progressing  Goal: Maintains adequate nutritional intake  Description: INTERVENTIONS:  - Monitor percentage of each meal consumed  - Identify factors contributing to decreased intake, treat as appropriate  - Assist with meals  as needed  - Monitor I&O, weight, and lab values if indicated  - Obtain nutrition services referral as needed  Outcome: Progressing  Goal: Establish and maintain optimal ostomy function  Description: INTERVENTIONS:  - Assess bowel function  - Encourage oral fluids to ensure adequate hydration  - Administer IV fluids if ordered to ensure adequate hydration   - Administer ordered medications as needed  - Encourage mobilization and activity  - Nutrition services referral to assist patient with appropriate food choices  - Assess stoma site  - Consider wound care consult   Outcome: Progressing  Goal: Oral mucous membranes remain intact  Description: INTERVENTIONS  - Assess oral mucosa and hygiene practices  - Implement preventative oral hygiene regimen  - Implement oral medicated treatments as ordered  - Initiate Nutrition services referral as needed  Outcome: Progressing     Problem: MUSCULOSKELETAL - ADULT  Goal: Maintain or return mobility to safest level of function  Description: INTERVENTIONS:  - Assess patient's ability to carry out ADLs; assess patient's baseline for ADL function and identify physical deficits which impact ability to perform ADLs (bathing, care of mouth/teeth, toileting, grooming, dressing, etc.)  - Assess/evaluate cause of self-care deficits   - Assess range of motion  - Assess patient's mobility  - Assess patient's need for assistive devices and provide as appropriate  - Encourage maximum independence but intervene and supervise when necessary  - Involve family in performance of ADLs  - Assess for home care needs following discharge   - Consider OT consult to assist with ADL evaluation and planning for discharge  - Provide patient education as appropriate  Outcome: Progressing  Goal: Maintain proper alignment of affected body part  Description: INTERVENTIONS:  - Support, maintain and protect limb and body alignment  - Provide patient/ family with appropriate education  Outcome: Progressing      Problem: NEUROSENSORY - ADULT  Goal: Achieves maximal functionality and self care  Description: INTERVENTIONS  - Monitor swallowing and airway patency with patient fatigue and changes in neurological status  - Encourage and assist patient to increase activity and self care.   - Encourage visually impaired, hearing impaired and aphasic patients to use assistive/communication devices  Outcome: Progressing     Problem: GENITOURINARY - ADULT  Goal: Maintains or returns to baseline urinary function  Description: INTERVENTIONS:  - Assess urinary function  - Encourage oral fluids to ensure adequate hydration if ordered  - Administer IV fluids as ordered to ensure adequate hydration  - Administer ordered medications as needed  - Offer frequent toileting  - Follow urinary retention protocol if ordered  Outcome: Progressing  Goal: Absence of urinary retention  Description: INTERVENTIONS:  - Assess patient’s ability to void and empty bladder  - Monitor I/O  - Bladder scan as needed  - Discuss with physician/AP medications to alleviate retention as needed  - Discuss catheterization for long term situations as appropriate  Outcome: Progressing     Problem: METABOLIC, FLUID AND ELECTROLYTES - ADULT  Goal: Electrolytes maintained within normal limits  Description: INTERVENTIONS:  - Monitor labs and assess patient for signs and symptoms of electrolyte imbalances  - Administer electrolyte replacement as ordered  - Monitor response to electrolyte replacements, including repeat lab results as appropriate  - Instruct patient on fluid and nutrition as appropriate  Outcome: Progressing  Goal: Fluid balance maintained  Description: INTERVENTIONS:  - Monitor labs   - Monitor I/O and WT  - Instruct patient on fluid and nutrition as appropriate  - Assess for signs & symptoms of volume excess or deficit  Outcome: Progressing  Goal: Glucose maintained within target range  Description: INTERVENTIONS:  - Monitor Blood Glucose as ordered  -  Assess for signs and symptoms of hyperglycemia and hypoglycemia  - Administer ordered medications to maintain glucose within target range  - Assess nutritional intake and initiate nutrition service referral as needed  Outcome: Progressing     Problem: SKIN/TISSUE INTEGRITY - ADULT  Goal: Skin Integrity remains intact(Skin Breakdown Prevention)  Description: Assess:  -Perform Rudolph assessment every shift   -Clean and moisturize skin every PRN/order   -Inspect skin when repositioning, toileting, and assisting with ADLS  -Assess extremities for adequate circulation and sensation     Bed Management:  -Have minimal linens on bed & keep smooth, unwrinkled  -Change linens as needed when moist or perspiring  -Avoid sitting or lying in one position for more than 2 hours while in bed  -Keep HOB at 45 degrees     Toileting:  -Offer bedside commode  -Assess for incontinence every PRN/2 hours   -Use incontinent care products after each incontinent episode such as foaming cleanser     Activity:  -Mobilize patient 3 times a day  -Encourage activity and walks on unit  -Encourage or provide ROM exercises   -Turn and reposition patient every 2 Hours  -Use appropriate equipment to lift or move patient in bed  -Instruct/ Assist with weight shifting every 2 hours when out of bed in chair  -Consider limitation of chair time 1 hour intervals    Skin Care:  -Avoid use of baby powder, tape, friction and shearing, hot water or constrictive clothing  -Relieve pressure over bony prominences using alyvon  -Do not massage red bony areas    Next Steps:  -Teach patient strategies to minimize risks such as weight shifting    -Consider consults to  interdisciplinary teams such as wound care   Outcome: Progressing     Problem: INFECTION - ADULT  Goal: Absence or prevention of progression during hospitalization  Description: INTERVENTIONS:  - Assess and monitor for signs and symptoms of infection  - Monitor lab/diagnostic results  - Monitor all  insertion sites, i.e. indwelling lines, tubes, and drains  - Monitor endotracheal if appropriate and nasal secretions for changes in amount and color  - [place] appropriate cooling/warming therapies per order  - Administer medications as ordered  - Instruct and encourage patient and family to use good hand hygiene technique  - Identify and instruct in appropriate isolation precautions for identified infection/condition  Outcome: Progressing  Goal: Absence of fever/infection during neutropenic period  Description: INTERVENTIONS:  - Monitor WBC    Outcome: Progressing     Problem: SAFETY ADULT  Goal: Patient will remain free of falls  Description: INTERVENTIONS:  - Educate patient/family on patient safety including physical limitations  - Instruct patient to call for assistance with activity   - Consult OT/PT to assist with strengthening/mobility   - Keep Call bell within reach  - Keep bed low and locked with side rails adjusted as appropriate  - Keep care items and personal belongings within reach  - Initiate and maintain comfort rounds  - Make Fall Risk Sign visible to staff  - Offer Toileting every 2 Hours, in advance of need  - Initiate/Maintain bed and chair alarm  - Obtain necessary fall risk management equipment: bed and chair alarm/yellow socks and bracelet   - Apply yellow socks and bracelet for high fall risk patients  - Consider moving patient to room near nurses station  Outcome: Progressing  Goal: Maintain or return to baseline ADL function  Description: INTERVENTIONS:  -  Assess patient's ability to carry out ADLs; assess patient's baseline for ADL function and identify physical deficits which impact ability to perform ADLs (bathing, care of mouth/teeth, toileting, grooming, dressing, etc.)  - Assess/evaluate cause of self-care deficits   - Assess range of motion  - Assess patient's mobility; develop plan if impaired  - Assess patient's need for assistive devices and provide as appropriate  - Encourage  maximum independence but intervene and supervise when necessary  - Involve family in performance of ADLs  - Assess for home care needs following discharge   - Consider OT consult to assist with ADL evaluation and planning for discharge  - Provide patient education as appropriate  Outcome: Progressing  Goal: Maintains/Returns to pre admission functional level  Description: INTERVENTIONS:  - Perform AM-PAC 6 Click Basic Mobility/ Daily Activity assessment daily.  - Set and communicate daily mobility goal to care team and patient/family/caregiver.   - Collaborate with rehabilitation services on mobility goals if consulted  - Perform Range of Motion 3 times a day.  - Reposition patient every 2 hours.  - Dangle patient 3 times a day  - Stand patient 3 times a day  - Ambulate patient 3 times a day  - Out of bed to chair 3 times a day   - Out of bed for meals 3 times a day  - Out of bed for toileting  - Record patient progress and toleration of activity level   Outcome: Progressing     Problem: PAIN - ADULT  Goal: Verbalizes/displays adequate comfort level or baseline comfort level  Description: Interventions:  - Encourage patient to monitor pain and request assistance  - Assess pain using appropriate pain scale  - Administer analgesics based on type and severity of pain and evaluate response  - Implement non-pharmacological measures as appropriate and evaluate response  - Consider cultural and social influences on pain and pain management  - Notify physician/advanced practitioner if interventions unsuccessful or patient reports new pain  Outcome: Progressing     Problem: DISCHARGE PLANNING  Goal: Discharge to home or other facility with appropriate resources  Description: INTERVENTIONS:  - Identify barriers to discharge w/patient and caregiver  - Arrange for needed discharge resources and transportation as appropriate  - Identify discharge learning needs (meds, wound care, etc.)  - Arrange for interpretive services to  assist at discharge as needed  - Refer to Case Management Department for coordinating discharge planning if the patient needs post-hospital services based on physician/advanced practitioner order or complex needs related to functional status, cognitive ability, or social support system  Outcome: Progressing     Problem: Knowledge Deficit  Goal: Patient/family/caregiver demonstrates understanding of disease process, treatment plan, medications, and discharge instructions  Description: Complete learning assessment and assess knowledge base.  Interventions:  - Provide teaching at level of understanding  - Provide teaching via preferred learning methods  Outcome: Progressing     Problem: Nutrition/Hydration-ADULT  Goal: Nutrient/Hydration intake appropriate for improving, restoring or maintaining nutritional needs  Description: Monitor and assess patient's nutrition/hydration status for malnutrition. Collaborate with interdisciplinary team and initiate plan and interventions as ordered.  Monitor patient's weight and dietary intake as ordered or per policy. Utilize nutrition screening tool and intervene as necessary. Determine patient's food preferences and provide high-protein, high-caloric foods as appropriate.     INTERVENTIONS:  - Monitor oral intake, urinary output, labs, and treatment plans  - Assess nutrition and hydration status and recommend course of action  - Evaluate amount of meals eaten  - Assist patient with eating if necessary   - Allow adequate time for meals  - Recommend/ encourage appropriate diets, oral nutritional supplements, and vitamin/mineral supplements  - Order, calculate, and assess calorie counts as needed  - Recommend, monitor, and adjust tube feedings and TPN/PPN based on assessed needs  - Assess need for intravenous fluids  - Provide specific nutrition/hydration education as appropriate  - Include patient/family/caregiver in decisions related to nutrition  Outcome: Progressing

## 2024-07-27 NOTE — PROGRESS NOTES
NEPHROLOGY PROGRESS NOTE   Kristen Maguire 88 y.o. female MRN: [id number]  Unit/Bed#: [address] [place] [address] Encounter: [id number]  Reason for Consult: Hypercalcemia    ASSESSMENT AND PLAN:  88 y.o.  woman with PMH of GERD, asthma, A-fib, pancreatic/liver cancer p/w weakness.  Nephrology is consulted for management of hypercalcemia.     PLAN        # Hypercalcemia  Etiology : Secondary to malignancy versus primary hyperparathyroidism   Ionized calcium 1.2, improved after IV fluids, Bumex, pamidronate, calcitonin  Off IV fluids at this time   Recommend  NM scan to rule out parathyroid adenoma   , should be suppressed in the settings of hypercalcemia  Can follow-up with endocrinology on discharge  Patient is a stable for discharge from my end with [facility] in 1 week to monitor sodium     #Volume status/blood pressure :  Volume: Euvolemic  Blood pressure: Normotensive, /56, goal less than 140/90  Recommend:  Of IV fluids  On Coreg 3.1 twice daily    # Hyponatremia  Serum sodium 132 mEq/L  Etiology: 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  Previous records were personally reviewed by me to obtain a baseline creatinine.   The images (CXR) were personally reviewed by me in PACS      SUBJECTIVE:  Patient seen and examined at bedside. No chest pain, shortness of breath, nausea,    OBJECTIVE:  Current Weight: Weight - Scale: 95.5 kg (210 lb 9.6 oz)  Vitals:    07/26/24 2246    BP: 115/56   Pulse: 60   Resp:    Temp: (!) 97.2 °F (36.2 °C)   SpO2: 90%       Intake/Output Summary (Last 24 hours) at 7/27/2024 0727  Last data filed at 7/27/2024 0353  Gross per 24 hour   Intake --   Output 550 ml   Net -550 ml     Wt Readings from Last 3 Encounters:   07/22/24 95.5 kg (210 lb 9.6 oz)   04/12/24 94.8 kg (209 lb)   04/04/24 94.8 kg (209 lb)     Temp Readings from Last 3 Encounters:   07/26/24 (!) 97.2 °F (36.2 °C)   04/12/24 (!) 97.4 °F (36.3 °C)   02/20/24 97.7 °F (36.5 °C) (Oral)     BP Readings from Last 3 Encounters:   07/26/24 115/56   04/12/24 122/58   04/04/24 131/71     Pulse Readings from Last 3 Encounters:   07/26/24 60   04/12/24 58   04/04/24 60        General:  no acute distress at this time, elderly  Skin:  No acute rash  Eyes:  No scleral icterus and noninjected  ENT:  mucous membranes moist  Neck:  no carotid bruits  Chest:  Clear to auscultation percussion, good respiratory effort, no use of accessory respiratory muscles  CVS:  Regular rate and rhythm without rub   Abdomen:  soft and nontender   Extremities: no significant lower extremity edema  Neuro:  No gross focality  Psych:  Alert , cooperative       Medications:    Current Facility-Administered Medications:     acetaminophen (TYLENOL) tablet 650 mg, 650 mg, Oral, Q6H PRN, Olayide D Olaniyan, CRNP    allopurinol (ZYLOPRIM) tablet 100 mg, 100 mg, Oral, Daily, Olayide D Olaniyan, CRNP, 100 mg at 07/26/24 0859    apixaban (ELIQUIS) tablet 5 mg, 5 mg, Oral, BID, Olayide D Olaniyan, CRNP, 5 mg at 07/26/24 2043    bisacodyl (DULCOLAX) rectal suppository 10 mg, 10 mg, Rectal, Daily PRN, Olayide D Olaniyan, CRNP    carvedilol (COREG) tablet 3.125 mg, 3.125 mg, Oral, BID With Meals, Olayide D Olaniyan, CRNP, 3.125 mg at 07/26/24 1736    colchicine (COLCRYS) tablet 0.6 mg, 0.6 mg, Oral, Daily, Olayide D Olaniyan, CRNP, 0.6 mg at 07/26/24 0900    docusate sodium (COLACE) capsule 100 mg, 100 mg, Oral, BID, Olayide D Olfrancisco, CRNP,  100 mg at 07/26/24 1736    famotidine (PEPCID) tablet 40 mg, 40 mg, Oral, Daily, Olayide D Olaniyan, CRNP, 40 mg at 07/26/24 0859    montelukast (SINGULAIR) tablet 10 mg, 10 mg, Oral, QPM, Olayide D Olaniyan, CRNP, 10 mg at 07/26/24 1736    ondansetron (ZOFRAN) injection 4 mg, 4 mg, Intravenous, Q6H PRN, Olayide D Olaniyan, CRNP, 4 mg at 07/23/24 0507    polyethylene glycol (MIRALAX) packet 17 g, 17 g, Oral, Daily, Olayide D Olaniyan, CRNP, 17 g at 07/24/24 0858    risperiDONE (RisperDAL) tablet 0.25 mg, 0.25 mg, Oral, Daily, Olayide D Olaniyan, CRNP, 0.25 mg at 07/26/24 0859    senna (SENOKOT) tablet 8.6 mg, 1 tablet, Oral, Daily, Olayide D Olaniyan, CRNP, 8.6 mg at 07/26/24 0859    sertraline (ZOLOFT) tablet 25 mg, 25 mg, Oral, Daily, Olayide D Olaniyan, CRNP, 25 mg at 07/26/24 0900    sodium chloride tablet 1 g, 1 g, Oral, Daily, Joselyn Reyes Bahamonde, MD    Laboratory Results:  Results from last 7 days   Lab Units 07/27/24  0354 07/26/24  0529 07/25/24  2246 07/25/24  0500 07/24/24  0543 07/23/24  0503 07/22/24  1212   WBC Thousand/uL 13.27* 12.16*  --  14.77* 12.87* 13.82* 13.84*   HEMOGLOBIN g/dL 9.6* 9.1* 10.0* 9.5* 9.2* 9.6* 11.0*   HEMATOCRIT % 30.4* 30.3* 32.4* 31.4* 31.0* 31.8* 36.0   PLATELETS Thousands/uL 419* 395*  --  417* 418* 460* 498*   SODIUM mmol/L 132* 134*  --  133* 133* 131* 133*   POTASSIUM mmol/L 3.6 2.9*  --  3.3* 3.9 3.2* 3.6   CHLORIDE mmol/L 103 104  --  104 103 100 98   CO2 mmol/L 24 26  --  25 26 24 27   BUN mg/dL 10 10  --  10 12 17 19   CREATININE mg/dL 0.65 0.66  --  0.62 0.67 0.78 0.89   CALCIUM mg/dL 9.4 9.8  --  10.1 10.5* 11.6* 13.5*   MAGNESIUM mg/dL  --   --   --   --   --  2.0 2.2       CT abdomen pelvis wo contrast   Final Result by Max Kaye MD (07/22 1607)      Fluid-filled distal esophagus.   The stomach is unremarkable without hernia.   No evidence of obstruction.      Stable appearance of an anterior pelvic abdominal wall fat-containing hernia with similar  "infiltration of the involved herniated fat.   Clinical correlation advised for focal pain and reducibility.      Enlarging central left hepatic lesion, not well evaluated on this noncontrast exam.   Interval decrease in size of a exophytic irregular lesion from the right kidney upper pole, possibly representing an involuting cyst component.   Grossly stable cystic pancreatic lesions as described, with large ill-defined lesion in the pancreatic head.      New enlarged abdominal nodes as described, measuring up to 3 cm.   New 5 mm left lower lobe pulmonary nodule.      Overall the above findings are consistent with worsening malignancy and considerably worsening metastatic disease.   Limit evaluation on this noncontrast exam.   Postcontrast MRI versus CT recommended for further evaluation.         The study was marked in EPIC for immediate notification.      Workstation performed: [id number]         XR chest 1 view   Final Result by Devyn Scanlon MD (07/22 9372)      Prominent left paramediastinal density which may be accentuated due to positioning. Known left thyroid goiter. Follow-up with be recommended.      The study was marked in EPIC for immediate notification.      Workstation performed: [id number]         CT head without contrast   Final Result by Laci Miller MD (07/22 1400)      No acute intracranial abnormality.                  Workstation performed: [id number]             Portions of the record may have been created with voice recognition software. Occasional wrong word or \"sound a like\" substitutions may have occurred due to the inherent limitations of voice recognition software. Read the chart carefully and recognize, using context, where substitutions have occurred.    "

## 2024-07-27 NOTE — PROGRESS NOTES
INTERNAL MEDICINE PROGRESS NOTE     Name: Kristen Maguire   Age & Sex: 88 y.o. female   MRN: [id number]  Unit/Bed#: [address] [place] [address]   Encounter: [id number]  Hospital Stay Days: 5      ASSESSMENT/PLAN     Principal Problem:    Hypercalcemia  Active Problems:    Generalized weakness    Leukocytosis    Thrombocytosis    Atrial fibrillation (HCC)    GERD (gastroesophageal reflux disease)    Psychiatric disorder    Constipation    History of gout    Hypokalemia    Hyponatremia    Abnormal CT of the abdomen    Goals of care, counseling/discussion    Dementia (HCC)      Dementia (HCC)  Assessment & Plan  Mental status waxes and wanes per family  Supportive care  Delirium precautions    Goals of care, counseling/discussion  Assessment & Plan  Discussed with patient findings on CT A/P in regards to her progression of cancer. Reports she follows with [facility] and was previously on therapies, but stopped due to multiple side effects. Patient at this time isn't interested in resuming chemo/radiation therapies. Discussed that her physical decline is due to her worsening cancer causing electrolyte abnormalities and weakness. Then explained that if she doesn't want to pursue therapy for cancer, we should consider transition to palliative and hospice care.   Previous provider discussed with patient's daughter the idea of hospice.  After further discussion with family today 7/26, family is not ready to transition to hospice at this time.  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 Will need follow up with Oncology outpatient at [facility]    Hyponatremia  Assessment & Plan  Serum sodium currently at 133  Likely secondary to decreased oral intake   Monitor serum sodium  Stable      Hypokalemia  Assessment & Plan  Continue to monitor and replete as needed      History of gout  Assessment & Plan  Continue Allopurinol/Colchicine    Constipation  Assessment & Plan  CT abdomen/pelvis demonstrated large colonic stool burden.  Small bowel unremarkable without evidence of obstruction.  Optimize bowel regimen as necessary    Psychiatric disorder  Assessment & Plan  Continue Risperdal/Zoloft    GERD (gastroesophageal reflux disease)  Assessment & Plan  Continue Pepcid    Atrial fibrillation (HCC)  Assessment & Plan  S/P pacemaker  On Coreg for rate control  On Eliquis for anticoagulation    Thrombocytosis  Assessment & Plan  Likely reactive  Monitor platelet count    Leukocytosis  Assessment & Plan  Likely reactive due to acute medical issue(s)/cancer and degree of hemoconcentration from hypercalcemia requiring IV fluids  Remains afebrile  Monitor WBC count    Generalized weakness  Assessment & Plan  7/22 - presented to the ED with complaints of a few days of vomiting and poor oral intake. Reports abdominal pain and generalized weakness also for a few days.  Reports of more confusion than usual  Chest x-ray showed pulm left paramediastinal density which will be accentuated due to positioning and known left thyroid goiter  CT abdomen/pelvis without contrast demonstrated worsening malignancy and considerably worsening metastatic disease compared to CT abdomen/pelvis of 1/13/2024.  CT head without contrast with no evidence of acute intracranial abnormality  Low level ammonia at 15  Respiratory panel negative  UA with leukocytes, nitrites negative and she is asymptomatic   Noted to have hypercalcemia, see treatment above  PT/OT eval and treat- recommending STR, pending recommendations      * Hypercalcemia  Assessment & Plan  As evidenced by calcium 13.5 and ionized 1.69 on admission   Likely secondary to worsening malignancy as evidenced by findings on CT abdomen/pelvis which showed worsening malignancy  and considerably worsening metastatic disease compared to CT abdomen/pelvis of 2024.  Nephrology consulted, recommendations are appreciated  S/p Calcitonin 100mg IM x 2 doses  S/p pamidronate 30mg IV x 1 dose on   PTH elevated at 102.5  Spoke with nephrology who is recommending NM scan to rule out PTH adenoma -this can be done outpatient.   discontinued IVF and Bumex  Monitor daily CMP, ionized calcium  Ca has normalized on am labs          VTE Pharmacologic Prophylaxis:   Pharmacologic: Apixaban (Eliquis)  Mechanical VTE Prophylaxis in Place: No    Patient Centered Rounds: I have performed bedside rounds with nursing staff today.    Discussions with Specialists or Other Care Team Provider: Appreciate nephrology recommendations    Education and Discussions with Family / Patient: Discussed with patient, will discuss with family this afternoon    Time Spent for Care: 30 minutes.  More than 50% of total time spent on counseling and coordination of care as described above.    Current Length of Stay: 5 day(s)    Current Patient Status: Inpatient   Certification Statement: The patient will continue to require additional inpatient hospital stay due to rehab facility pending    Discharge Plan / Estimated Discharge Date: 24-48 hours    Code Status: Level 1 - Full Code    Subjective:     Patient seen and examined at bedside this morning, no acute concerns, rehab placement pending.    Objective:   Vitals:   Temp (24hrs), Av.6 °F (36.4 °C), Min:97.2 °F (36.2 °C), Max:97.9 °F (36.6 °C)    Temp:  [97.2 °F (36.2 °C)-97.9 °F (36.6 °C)] 97.2 °F (36.2 °C)  HR:  [60-61] 60  BP: (113-116)/(55-56) 113/55  SpO2:  [90 %-95 %] 92 %  Body mass index is 36.15 kg/m².     Input and Output Summary (last 24 hours):     Intake/Output Summary (Last 24 hours) at 2024 1039  Last data filed at 2024 0353  Gross per 24 hour   Intake --   Output 550 ml   Net -550 ml     Physical Exam:   Physical Exam  Constitutional:        General: She is not in acute distress.  HENT:      Head: Normocephalic and atraumatic.   Eyes:      General: No scleral icterus.     Conjunctiva/sclera: Conjunctivae normal.   Cardiovascular:      Rate and Rhythm: Normal rate. Rhythm irregular.      Pulses: Normal pulses.      Heart sounds: No murmur heard.  Pulmonary:      Effort: Pulmonary effort is normal. No respiratory distress.      Breath sounds: No wheezing or rales.   Abdominal:      General: Bowel sounds are normal. There is no distension.      Tenderness: There is no abdominal tenderness.   Musculoskeletal:         General: Swelling present. Normal range of motion.   Skin:     General: Skin is warm.      Capillary Refill: Capillary refill takes less than 2 seconds.      Coloration: Skin is not jaundiced.      Findings: No bruising.   Neurological:      General: No focal deficit present.      Mental Status: She is alert. Mental status is at baseline.   Psychiatric:         Mood and Affect: Mood normal.         Behavior: Behavior normal.           Additional Data:   Basic Laboratory Review:   Results from last 7 days   Lab Units 07/27/24  0354 07/26/24  0529 07/25/24  0500 07/24/24  0543   SODIUM mmol/L 132* 134* 133* 133*   POTASSIUM mmol/L 3.6 2.9* 3.3* 3.9   CHLORIDE mmol/L 103 104 104 103   CO2 mmol/L 24 26 25 26   BUN mg/dL 10 10 10 12   CREATININE mg/dL 0.65 0.66 0.62 0.67   GLUCOSE RANDOM mg/dL 95 92 91 91   CALCIUM mg/dL 9.4 9.8 10.1 10.5*   ALBUMIN g/dL  --  2.5* 2.6* 2.8*   ALK PHOS U/L  --  60 57 59   ALT U/L  --  6* 5* 5*   AST U/L  --  11* 11* 11*   EGFR ml/min/1.73sq m 80 79 81 79       Results from last 7 days   Lab Units 07/27/24  0354 07/26/24  0529 07/25/24  2246 07/25/24  0500 07/24/24  0543 07/23/24  0503   WBC Thousand/uL 13.27* 12.16*  --  14.77* 12.87* 13.82*   HEMOGLOBIN g/dL 9.6* 9.1* 10.0* 9.5* 9.2* 9.6*   HEMATOCRIT % 30.4* 30.3* 32.4* 31.4* 31.0* 31.8*   PLATELETS Thousands/uL 419* 395*  --  417* 418* 460*   SEGS PCT %  --   --    --  75 76* 75   LYMPHO PCT %  --   --   --  12* 11* 13*   MONO PCT %  --   --   --  10 10 9   EOS PCT %  --   --   --  1 2 2   BASOS PCT %  --   --   --  1 0 1   TOTAL NEUT ABS Thousands/µL  --   --   --  11.15* 9.84* 10.43*   LYMPHS ABS Thousands/µL  --   --   --  1.78 1.45 1.83   MONOS ABS Thousand/µL  --   --   --  1.49* 1.26* 1.22   EOS ABS Thousand/µL  --   --   --  0.20 0.20 0.22   BASOS ABS Thousands/µL  --   --   --  0.08 0.05 0.07         Results from last 7 days   Lab Units 07/22/24  1212   BNP pg/mL 55       Additional Labs:  Results from last 7 days   Lab Units 07/23/24  0503 07/22/24  1212   MAGNESIUM mg/dL 2.0 2.2   AMMONIA umol/L  --  15*                Recent Cultures (last 7 days):         * I Have Reviewed All Lab Data Listed Above.  * Additional Pertinent Lab Tests Reviewed: All Labs Within Last 24 Hours Reviewed    Imaging:  Prior imaging studies and reports reviewed    Last 24 Hours Medication List:   Current Facility-Administered Medications   Medication Dose Route Frequency Provider Last Rate    acetaminophen  650 mg Oral Q6H PRN Olayide D Olaniyan, CRNP      allopurinol  100 mg Oral Daily Olayide D Olaniyan, CRNP      apixaban  5 mg Oral BID Olayide D Olaniyan, CRNP      bisacodyl  10 mg Rectal Daily PRN Olayide D Olaniyan, CRNP      carvedilol  3.125 mg Oral BID With Meals Olayide D Olaniyan, CRNP      colchicine  0.6 mg Oral Daily Olayide D Olaniyan, CRNP      docusate sodium  100 mg Oral BID Olayide D Olaniyan, CRNP      famotidine  40 mg Oral Daily Olayide D Olaniyan, CRNP      montelukast  10 mg Oral QPM Olayide D Olaniyan, CRNP      ondansetron  4 mg Intravenous Q6H PRN Olayide D Olaniyan, CRNP      polyethylene glycol  17 g Oral Daily Olayide D Olaniyan, CRNP      risperiDONE  0.25 mg Oral Daily Olayide D Olaniyan, CRNP      senna  1 tablet Oral Daily HOPE Coronado      sertraline  25 mg Oral Daily HOPE Corondao      sodium chloride  1 g Oral Daily Joselyn Reyes  MD Harrison       Today, Patient Was Seen By: Zheng Camp DO

## 2024-07-27 NOTE — PLAN OF CARE
Problem: Prexisting or High Potential for Compromised Skin Integrity  Goal: Skin integrity is maintained or improved  Description: INTERVENTIONS:  - Identify patients at risk for skin breakdown  - Assess and monitor skin integrity  - Assess and monitor nutrition and hydration status  - Monitor labs   - Assess for incontinence   - Turn and reposition patient  - Assist with mobility/ambulation  - Relieve pressure over bony prominences  - Avoid friction and shearing  - Provide appropriate hygiene as needed including keeping skin clean and dry  - Evaluate need for skin moisturizer/barrier cream  - Collaborate with interdisciplinary team   - Patient/family teaching  - Consider wound care consult   Outcome: Progressing     Problem: GASTROINTESTINAL - ADULT  Goal: Minimal or absence of nausea and/or vomiting  Description: INTERVENTIONS:  - Administer IV fluids if ordered to ensure adequate hydration  - Maintain NPO status until nausea and vomiting are resolved  - Nasogastric tube if ordered  - Administer ordered antiemetic medications as needed  - Provide nonpharmacologic comfort measures as appropriate  - Advance diet as tolerated, if ordered  - Consider nutrition services referral to assist patient with adequate nutrition and appropriate food choices  Outcome: Progressing  Goal: Maintains or returns to baseline bowel function  Description: INTERVENTIONS:  - Assess bowel function  - Encourage oral fluids to ensure adequate hydration  - Administer IV fluids if ordered to ensure adequate hydration  - Administer ordered medications as needed  - Encourage mobilization and activity  - Consider nutritional services referral to assist patient with adequate nutrition and appropriate food choices  Outcome: Progressing  Goal: Maintains adequate nutritional intake  Description: INTERVENTIONS:  - Monitor percentage of each meal consumed  - Identify factors contributing to decreased intake, treat as appropriate  - Assist with meals  as needed  - Monitor I&O, weight, and lab values if indicated  - Obtain nutrition services referral as needed  Outcome: Progressing  Goal: Establish and maintain optimal ostomy function  Description: INTERVENTIONS:  - Assess bowel function  - Encourage oral fluids to ensure adequate hydration  - Administer IV fluids if ordered to ensure adequate hydration   - Administer ordered medications as needed  - Encourage mobilization and activity  - Nutrition services referral to assist patient with appropriate food choices  - Assess stoma site  - Consider wound care consult   Outcome: Progressing  Goal: Oral mucous membranes remain intact  Description: INTERVENTIONS  - Assess oral mucosa and hygiene practices  - Implement preventative oral hygiene regimen  - Implement oral medicated treatments as ordered  - Initiate Nutrition services referral as needed  Outcome: Progressing     Problem: PAIN - ADULT  Goal: Verbalizes/displays adequate comfort level or baseline comfort level  Description: Interventions:  - Encourage patient to monitor pain and request assistance  - Assess pain using appropriate pain scale  - Administer analgesics based on type and severity of pain and evaluate response  - Implement non-pharmacological measures as appropriate and evaluate response  - Consider cultural and social influences on pain and pain management  - Notify physician/advanced practitioner if interventions unsuccessful or patient reports new pain  Outcome: Progressing     Problem: MUSCULOSKELETAL - ADULT  Goal: Maintain or return mobility to safest level of function  Description: INTERVENTIONS:  - Assess patient's ability to carry out ADLs; assess patient's baseline for ADL function and identify physical deficits which impact ability to perform ADLs (bathing, care of mouth/teeth, toileting, grooming, dressing, etc.)  - Assess/evaluate cause of self-care deficits   - Assess range of motion  - Assess patient's mobility  - Assess patient's  need for assistive devices and provide as appropriate  - Encourage maximum independence but intervene and supervise when necessary  - Involve family in performance of ADLs  - Assess for home care needs following discharge   - Consider OT consult to assist with ADL evaluation and planning for discharge  - Provide patient education as appropriate  Outcome: Progressing  Goal: Maintain proper alignment of affected body part  Description: INTERVENTIONS:  - Support, maintain and protect limb and body alignment  - Provide patient/ family with appropriate education  Outcome: Progressing     Problem: INFECTION - ADULT  Goal: Absence or prevention of progression during hospitalization  Description: INTERVENTIONS:  - Assess and monitor for signs and symptoms of infection  - Monitor lab/diagnostic results  - Monitor all insertion sites, i.e. indwelling lines, tubes, and drains  - Monitor endotracheal if appropriate and nasal secretions for changes in amount and color  - [place] appropriate cooling/warming therapies per order  - Administer medications as ordered  - Instruct and encourage patient and family to use good hand hygiene technique  - Identify and instruct in appropriate isolation precautions for identified infection/condition  Outcome: Progressing  Goal: Absence of fever/infection during neutropenic period  Description: INTERVENTIONS:  - Monitor WBC    Outcome: Progressing     Problem: SAFETY ADULT  Goal: Patient will remain free of falls  Description: INTERVENTIONS:  - Educate patient/family on patient safety including physical limitations  - Instruct patient to call for assistance with activity   - Consult OT/PT to assist with strengthening/mobility   - Keep Call bell within reach  - Keep bed low and locked with side rails adjusted as appropriate  - Keep care items and personal belongings within reach  - Initiate and maintain comfort rounds  - Make Fall Risk Sign visible to staff  - Offer Toileting every 2 Hours,  in advance of need  - Initiate/Maintain bed alarm  - Obtain necessary fall risk management equipment: fall risk sign on door  - Apply yellow socks and bracelet for high fall risk patients  - Consider moving patient to room near nurses station  Outcome: Progressing  Goal: Maintain or return to baseline ADL function  Description: INTERVENTIONS:  -  Assess patient's ability to carry out ADLs; assess patient's baseline for ADL function and identify physical deficits which impact ability to perform ADLs (bathing, care of mouth/teeth, toileting, grooming, dressing, etc.)  - Assess/evaluate cause of self-care deficits   - Assess range of motion  - Assess patient's mobility; develop plan if impaired  - Assess patient's need for assistive devices and provide as appropriate  - Encourage maximum independence but intervene and supervise when necessary  - Involve family in performance of ADLs  - Assess for home care needs following discharge   - Consider OT consult to assist with ADL evaluation and planning for discharge  - Provide patient education as appropriate  Outcome: Progressing  Goal: Maintains/Returns to pre admission functional level  Description: INTERVENTIONS:  - Perform AM-PAC 6 Click Basic Mobility/ Daily Activity assessment daily.  - Set and communicate daily mobility goal to care team and patient/family/caregiver.   - Collaborate with rehabilitation services on mobility goals if consulted  - Perform Range of Motion 2 times a day.  - Reposition patient every 2 hours.  - Dangle patient 2 times a day  - Stand patient 2 times a day  - Ambulate patient 3 times a day  - Out of bed to chair 3 times a day   - Out of bed for meals 3 times a day  - Out of bed for toileting  - Record patient progress and toleration of activity level   Outcome: Progressing     Problem: DISCHARGE PLANNING  Goal: Discharge to home or other facility with appropriate resources  Description: INTERVENTIONS:  - Identify barriers to discharge  w/patient and caregiver  - Arrange for needed discharge resources and transportation as appropriate  - Identify discharge learning needs (meds, wound care, etc.)  - Arrange for interpretive services to assist at discharge as needed  - Refer to Case Management Department for coordinating discharge planning if the patient needs post-hospital services based on physician/advanced practitioner order or complex needs related to functional status, cognitive ability, or social support system  Outcome: Progressing     Problem: Knowledge Deficit  Goal: Patient/family/caregiver demonstrates understanding of disease process, treatment plan, medications, and discharge instructions  Description: Complete learning assessment and assess knowledge base.  Interventions:  - Provide teaching at level of understanding  - Provide teaching via preferred learning methods  Outcome: Progressing     Problem: NEUROSENSORY - ADULT  Goal: Achieves maximal functionality and self care  Description: INTERVENTIONS  - Monitor swallowing and airway patency with patient fatigue and changes in neurological status  - Encourage and assist patient to increase activity and self care.   - Encourage visually impaired, hearing impaired and aphasic patients to use assistive/communication devices  Outcome: Progressing     Problem: GENITOURINARY - ADULT  Goal: Maintains or returns to baseline urinary function  Description: INTERVENTIONS:  - Assess urinary function  - Encourage oral fluids to ensure adequate hydration if ordered  - Administer IV fluids as ordered to ensure adequate hydration  - Administer ordered medications as needed  - Offer frequent toileting  - Follow urinary retention protocol if ordered  Outcome: Progressing  Goal: Absence of urinary retention  Description: INTERVENTIONS:  - Assess patient’s ability to void and empty bladder  - Monitor I/O  - Bladder scan as needed  - Discuss with physician/AP medications to alleviate retention as needed  -  Discuss catheterization for long term situations as appropriate  Outcome: Progressing     Problem: METABOLIC, FLUID AND ELECTROLYTES - ADULT  Goal: Electrolytes maintained within normal limits  Description: INTERVENTIONS:  - Monitor labs and assess patient for signs and symptoms of electrolyte imbalances  - Administer electrolyte replacement as ordered  - Monitor response to electrolyte replacements, including repeat lab results as appropriate  - Instruct patient on fluid and nutrition as appropriate  Outcome: Progressing  Goal: Fluid balance maintained  Description: INTERVENTIONS:  - Monitor labs   - Monitor I/O and WT  - Instruct patient on fluid and nutrition as appropriate  - Assess for signs & symptoms of volume excess or deficit  Outcome: Progressing  Goal: Glucose maintained within target range  Description: INTERVENTIONS:  - Monitor Blood Glucose as ordered  - Assess for signs and symptoms of hyperglycemia and hypoglycemia  - Administer ordered medications to maintain glucose within target range  - Assess nutritional intake and initiate nutrition service referral as needed  Outcome: Progressing     Problem: SKIN/TISSUE INTEGRITY - ADULT  Goal: Skin Integrity remains intact(Skin Breakdown Prevention)  Description: Assess:  -Perform Rudolph assessment every shift  -Clean and moisturize skin every day  -Inspect skin when repositioning, toileting, and assisting with ADLS  -Assess under medical devices such as oxygen every 2 hours  -Assess extremities for adequate circulation and sensation     Bed Management:  -Have minimal linens on bed & keep smooth, unwrinkled  -Change linens as needed when moist or perspiring  -Avoid sitting or lying in one position for more than 2 hours while in bed  -Keep HOB at 30 degrees     Toileting:  -Offer bedside commode  -Assess for incontinence every 2 hours  -Use incontinent care products after each incontinent episode such as barrier cream    Activity:  -Mobilize patient 3 times a  day  -Encourage activity and walks on unit  -Encourage or provide ROM exercises   -Turn and reposition patient every 2 Hours  -Use appropriate equipment to lift or move patient in bed  -Instruct/ Assist with weight shifting every 30 minutes when out of bed in chair  -Consider limitation of chair time 2 hour intervals    Skin Care:  -Avoid use of baby powder, tape, friction and shearing, hot water or constrictive clothing  -Relieve pressure over bony prominences using foam wedges  -Do not massage red bony areas    Next Steps:  -Teach patient strategies to minimize risks such as weight shifting   -Consider consults to  interdisciplinary teams such as OT/PT  Outcome: Progressing     Problem: Nutrition/Hydration-ADULT  Goal: Nutrient/Hydration intake appropriate for improving, restoring or maintaining nutritional needs  Description: Monitor and assess patient's nutrition/hydration status for malnutrition. Collaborate with interdisciplinary team and initiate plan and interventions as ordered.  Monitor patient's weight and dietary intake as ordered or per policy. Utilize nutrition screening tool and intervene as necessary. Determine patient's food preferences and provide high-protein, high-caloric foods as appropriate.     INTERVENTIONS:  - Monitor oral intake, urinary output, labs, and treatment plans  - Assess nutrition and hydration status and recommend course of action  - Evaluate amount of meals eaten  - Assist patient with eating if necessary   - Allow adequate time for meals  - Recommend/ encourage appropriate diets, oral nutritional supplements, and vitamin/mineral supplements  - Order, calculate, and assess calorie counts as needed  - Recommend, monitor, and adjust tube feedings and TPN/PPN based on assessed needs  - Assess need for intravenous fluids  - Provide specific nutrition/hydration education as appropriate  - Include patient/family/caregiver in decisions related to nutrition  Outcome: Progressing

## 2024-07-27 NOTE — ASSESSMENT & PLAN NOTE
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 Will need follow up with Oncology outpatient at [facility]

## 2024-07-28 PROBLEM — E87.6 HYPOKALEMIA: Status: RESOLVED | Noted: 2024-07-23 | Resolved: 2024-07-28

## 2024-07-28 LAB
ALBUMIN SERPL BCG-MCNC: 2.5 G/DL (ref 3.5–5)
ALP SERPL-CCNC: 66 U/L (ref 34–104)
ALT SERPL W P-5'-P-CCNC: 7 U/L (ref 7–52)
ANION GAP SERPL CALCULATED.3IONS-SCNC: 6 MMOL/L (ref 4–13)
AST SERPL W P-5'-P-CCNC: 27 U/L (ref 13–39)
BASOPHILS # BLD AUTO: 0.06 THOUSANDS/ÂΜL (ref 0–0.1)
BASOPHILS NFR BLD AUTO: 0 % (ref 0–1)
BILIRUB SERPL-MCNC: 0.56 MG/DL (ref 0.2–1)
BUN SERPL-MCNC: 11 MG/DL (ref 5–25)
CALCIUM ALBUM COR SERPL-MCNC: 10.4 MG/DL (ref 8.3–10.1)
CALCIUM SERPL-MCNC: 9.2 MG/DL (ref 8.4–10.2)
CHLORIDE SERPL-SCNC: 102 MMOL/L (ref 96–108)
CO2 SERPL-SCNC: 21 MMOL/L (ref 21–32)
CREAT SERPL-MCNC: 0.74 MG/DL (ref 0.6–1.3)
EOSINOPHIL # BLD AUTO: 0.25 THOUSAND/ÂΜL (ref 0–0.61)
EOSINOPHIL NFR BLD AUTO: 2 % (ref 0–6)
ERYTHROCYTE [DISTWIDTH] IN BLOOD BY AUTOMATED COUNT: 17.6 % (ref 11.6–15.1)
GFR SERPL CREATININE-BSD FRML MDRD: 73 ML/MIN/1.73SQ M
GLUCOSE SERPL-MCNC: 93 MG/DL (ref 65–140)
HCT VFR BLD AUTO: 31.1 % (ref 34.8–46.1)
HGB BLD-MCNC: 9.5 G/DL (ref 11.5–15.4)
IMM GRANULOCYTES # BLD AUTO: 0.08 THOUSAND/UL (ref 0–0.2)
IMM GRANULOCYTES NFR BLD AUTO: 1 % (ref 0–2)
LYMPHOCYTES # BLD AUTO: 1.67 THOUSANDS/ÂΜL (ref 0.6–4.47)
LYMPHOCYTES NFR BLD AUTO: 12 % (ref 14–44)
MCH RBC QN AUTO: 24.7 PG (ref 26.8–34.3)
MCHC RBC AUTO-ENTMCNC: 30.5 G/DL (ref 31.4–37.4)
MCV RBC AUTO: 81 FL (ref 82–98)
MONOCYTES # BLD AUTO: 1.61 THOUSAND/ÂΜL (ref 0.17–1.22)
MONOCYTES NFR BLD AUTO: 12 % (ref 4–12)
NEUTROPHILS # BLD AUTO: 10.32 THOUSANDS/ÂΜL (ref 1.85–7.62)
NEUTS SEG NFR BLD AUTO: 73 % (ref 43–75)
NRBC BLD AUTO-RTO: 0 /100 WBCS
PLATELET # BLD AUTO: 354 THOUSANDS/UL (ref 149–390)
PMV BLD AUTO: 10.3 FL (ref 8.9–12.7)
POTASSIUM SERPL-SCNC: 4.9 MMOL/L (ref 3.5–5.3)
PROT SERPL-MCNC: 5.7 G/DL (ref 6.4–8.4)
RBC # BLD AUTO: 3.84 MILLION/UL (ref 3.81–5.12)
SODIUM SERPL-SCNC: 129 MMOL/L (ref 135–147)
WBC # BLD AUTO: 13.99 THOUSAND/UL (ref 4.31–10.16)

## 2024-07-28 PROCEDURE — 99232 SBSQ HOSP IP/OBS MODERATE 35: CPT | Performed by: STUDENT IN AN ORGANIZED HEALTH CARE EDUCATION/TRAINING PROGRAM

## 2024-07-28 PROCEDURE — 80053 COMPREHEN METABOLIC PANEL: CPT | Performed by: STUDENT IN AN ORGANIZED HEALTH CARE EDUCATION/TRAINING PROGRAM

## 2024-07-28 PROCEDURE — 99232 SBSQ HOSP IP/OBS MODERATE 35: CPT | Performed by: INTERNAL MEDICINE

## 2024-07-28 PROCEDURE — 85025 COMPLETE CBC W/AUTO DIFF WBC: CPT | Performed by: STUDENT IN AN ORGANIZED HEALTH CARE EDUCATION/TRAINING PROGRAM

## 2024-07-28 RX ORDER — SODIUM CHLORIDE, SODIUM GLUCONATE, SODIUM ACETATE, POTASSIUM CHLORIDE, MAGNESIUM CHLORIDE, SODIUM PHOSPHATE, DIBASIC, AND POTASSIUM PHOSPHATE .53; .5; .37; .037; .03; .012; .00082 G/100ML; G/100ML; G/100ML; G/100ML; G/100ML; G/100ML; G/100ML
75 INJECTION, SOLUTION INTRAVENOUS CONTINUOUS
Status: DISPENSED | OUTPATIENT
Start: 2024-07-28 | End: 2024-07-28

## 2024-07-28 RX ADMIN — SERTRALINE HYDROCHLORIDE 25 MG: 25 TABLET ORAL at 09:19

## 2024-07-28 RX ADMIN — APIXABAN 5 MG: 5 TABLET, FILM COATED ORAL at 20:36

## 2024-07-28 RX ADMIN — APIXABAN 5 MG: 5 TABLET, FILM COATED ORAL at 09:20

## 2024-07-28 RX ADMIN — FAMOTIDINE 40 MG: 20 TABLET, FILM COATED ORAL at 09:19

## 2024-07-28 RX ADMIN — RISPERIDONE 0.25 MG: 0.25 TABLET, FILM COATED ORAL at 09:20

## 2024-07-28 RX ADMIN — SODIUM CHLORIDE, SODIUM GLUCONATE, SODIUM ACETATE, POTASSIUM CHLORIDE, MAGNESIUM CHLORIDE, SODIUM PHOSPHATE, DIBASIC, AND POTASSIUM PHOSPHATE 75 ML/HR: .53; .5; .37; .037; .03; .012; .00082 INJECTION, SOLUTION INTRAVENOUS at 08:22

## 2024-07-28 RX ADMIN — SENNOSIDES 8.6 MG: 8.6 TABLET, FILM COATED ORAL at 09:20

## 2024-07-28 RX ADMIN — CARVEDILOL 3.12 MG: 3.12 TABLET, FILM COATED ORAL at 17:01

## 2024-07-28 RX ADMIN — ALLOPURINOL 100 MG: 100 TABLET ORAL at 09:20

## 2024-07-28 RX ADMIN — MONTELUKAST 10 MG: 10 TABLET, FILM COATED ORAL at 17:01

## 2024-07-28 RX ADMIN — DOCUSATE SODIUM 100 MG: 100 CAPSULE, LIQUID FILLED ORAL at 09:20

## 2024-07-28 RX ADMIN — COLCHICINE 0.6 MG: 0.6 TABLET ORAL at 09:20

## 2024-07-28 NOTE — PROGRESS NOTES
[facility]  Progress Note  Name: Kristen Maguire I  MRN: [id number]  Unit/Bed#: 4 [address] I Date of Admission: 7/22/2024   Date of Service: 7/28/2024 I Hospital Day: 6    Assessment & Plan   * Hypercalcemia  Assessment & Plan  As evidenced by calcium 13.5 and ionized 1.69 on admission   Likely secondary to worsening malignancy as evidenced by findings on CT abdomen/pelvis which showed worsening malignancy and considerably worsening metastatic disease compared to CT abdomen/pelvis of 1/13/2024.  Nephrology consulted, recommendations are appreciated  S/p Calcitonin 100mg IM x 2 doses  S/p pamidronate 30mg IV x 1 dose on 7/25  PTH elevated at 102.5  Spoke with nephrology who is recommending NM scan to rule out PTH adenoma -this can be done outpatient.  7/26 discontinued IVF and Bumex  Calcium improved. Monitor daily CMP, ionized calcium      Generalized weakness  Assessment & Plan  7/22 - presented to the ED with complaints of a few days of vomiting and poor oral intake. Reports abdominal pain and generalized weakness also for a few days.  Reports of more confusion than usual  Chest x-ray showed pulm left paramediastinal density which will be accentuated due to positioning and known left thyroid goiter  CT abdomen/pelvis without contrast demonstrated worsening malignancy and considerably worsening metastatic disease compared to CT abdomen/pelvis of 1/13/2024.  CT head without contrast with no evidence of acute intracranial abnormality  Low level ammonia at 15  Respiratory panel negative  UA with leukocytes, nitrites negative and she is asymptomatic   Noted to have hypercalcemia, see treatment above  PT/OT eval and treat- Accepted to [facility]. Will not be able to take patient until Monday 7/29      Goals of care, counseling/discussion  Assessment & Plan  Discussed with patient findings on CT A/P in regards to her progression of cancer. Reports she follows with [facility] and  was previously on therapies, but stopped due to multiple side effects. Patient at this time isn't interested in resuming chemo/radiation therapies. Discussed that her physical decline is due to her worsening cancer causing electrolyte abnormalities and weakness. Then explained that if she doesn't want to pursue therapy for cancer, we should consider transition to palliative and hospice care.   Previous provider discussed with patient's daughter the idea of hospice.  After further discussion with family today 7/26, family is not ready to transition to hospice at this time.  CM notified - STR to possible long term     Hyponatremia  Assessment & Plan  Serum sodium currently at 129  Likely in the setting of poor solute intake vs SIADH in the setting of malignancy  Urine studies ordered  Started on plasma-lyte 75ml/hr for 5 hours  Continue sodium chloride 1g QD  Nephrology input appreciated    Dementia (HCC)  Assessment & Plan  Mental status waxes and wanes per family  Supportive care  Delirium precautions    Abnormal CT of the abdomen  Assessment & Plan  CT imaging with evidence of a prior pelvic hernia, pancreatic head mass with left hepatic lesion, and a 5 mm left lower lobe pulmonary nodule  CA 19-9 tumor marker elevated at 107  Oncology consulted. Will need follow up with Oncology outpatient at [facility]    History of gout  Assessment & Plan  Continue Allopurinol/Colchicine    Constipation  Assessment & Plan  CT abdomen/pelvis demonstrated large colonic stool burden.  Small bowel unremarkable without evidence of obstruction.  Optimize bowel regimen as necessary    Psychiatric disorder  Assessment & Plan  Continue Risperdal/Zoloft    GERD (gastroesophageal reflux disease)  Assessment & Plan  Continue Pepcid    Atrial fibrillation (HCC)  Assessment & Plan  S/P pacemaker  On Coreg for rate control  On Eliquis for anticoagulation    Thrombocytosis  Assessment & Plan  Likely reactive  Monitor platelet  count    Leukocytosis  Assessment & Plan  Likely reactive due to acute medical issue(s)/cancer and degree of hemoconcentration from hypercalcemia requiring IV fluids  Remains afebrile  Monitor WBC count             VTE Pharmacologic Prophylaxis:   Moderate Risk (Score 3-4) - Pharmacological DVT Prophylaxis Ordered: apixaban (Eliquis).    Mobility:   Basic Mobility Inpatient Raw Score: 8  JH-HLM Goal: 3: Sit at edge of bed  JH-HLM Achieved: 2: Bed activities/Dependent transfer  JH-HLM Goal NOT achieved. Continue with multidisciplinary rounding and encourage appropriate mobility to improve upon JH-HLM goals.    Patient Centered Rounds: I performed bedside rounds with nursing staff today.   Discussions with Specialists or Other Care Team Provider: Nursing, Nephrology    Education and Discussions with Family / Patient: Updated  (daughter) via phone.    Total Time Spent on Date of Encounter in care of patient: 45 mins. This time was spent on one or more of the following: performing physical exam; counseling and coordination of care; obtaining or reviewing history; documenting in the medical record; reviewing/ordering tests, medications or procedures; communicating with other healthcare professionals and discussing with patient's family/caregivers.    Current Length of Stay: 6 day(s)  Current Patient Status: Inpatient   Certification Statement: The patient will continue to require additional inpatient hospital stay due to hyponatremia, placement  Discharge Plan: Anticipate discharge in 24-48 hrs to rehab facility.    Code Status: Level 1 - Full Code    Subjective:   Patient sitting upright in bed. Denies any acute complaints at this time. Asked when breakfast was coming because she was hungry.     Objective:     Vitals:   Temp (24hrs), Av.9 °F (36.6 °C), Min:97.4 °F (36.3 °C), Max:98.8 °F (37.1 °C)    Temp:  [97.4 °F (36.3 °C)-98.8 °F (37.1 °C)] 97.4 °F (36.3 °C)  HR:  [57-69] 57  Resp:  [18-22] 22  BP:  (105-110)/(50-68) 105/50  SpO2:  [90 %-94 %] 91 %  Body mass index is 36.15 kg/m².     Input and Output Summary (last 24 hours):   No intake or output data in the 24 hours ending 07/28/24 0956    Physical Exam:   Physical Exam  Vitals and nursing note reviewed.   Constitutional:       General: She is not in acute distress.     Appearance: She is well-developed.   HENT:      Head: Normocephalic and atraumatic.   Eyes:      Conjunctiva/sclera: Conjunctivae normal.   Cardiovascular:      Rate and Rhythm: Normal rate and regular rhythm.      Heart sounds: No murmur heard.  Pulmonary:      Effort: Pulmonary effort is normal. No respiratory distress.      Breath sounds: Normal breath sounds.   Abdominal:      Palpations: Abdomen is soft.      Tenderness: There is no abdominal tenderness.   Musculoskeletal:         General: No swelling.      Cervical back: Neck supple.   Skin:     General: Skin is warm and dry.      Capillary Refill: Capillary refill takes less than 2 seconds.   Neurological:      Mental Status: She is alert. Mental status is at baseline.   Psychiatric:         Mood and Affect: Mood normal.          Additional Data:     Labs:  Results from last 7 days   Lab Units 07/28/24  0350   WBC Thousand/uL 13.99*   HEMOGLOBIN g/dL 9.5*   HEMATOCRIT % 31.1*   PLATELETS Thousands/uL 354   SEGS PCT % 73   LYMPHO PCT % 12*   MONO PCT % 12   EOS PCT % 2     Results from last 7 days   Lab Units 07/28/24  0350   SODIUM mmol/L 129*   POTASSIUM mmol/L 4.9   CHLORIDE mmol/L 102   CO2 mmol/L 21   BUN mg/dL 11   CREATININE mg/dL 0.74   ANION GAP mmol/L 6   CALCIUM mg/dL 9.2   ALBUMIN g/dL 2.5*   TOTAL BILIRUBIN mg/dL 0.56   ALK PHOS U/L 66   ALT U/L 7   AST U/L 27   GLUCOSE RANDOM mg/dL 93         Results from last 7 days   Lab Units 07/27/24  1628   POC GLUCOSE mg/dl 93               Lines/Drains:  Invasive Devices       Peripheral Intravenous Line  Duration             Peripheral IV 07/24/24 Dorsal (posterior);Left Hand 4 days     Peripheral IV 07/28/24 Distal;Dorsal (posterior);Left Forearm <1 day              Drain  Duration             External Urinary Catheter <1 day                          Imaging: No pertinent imaging reviewed.    Recent Cultures (last 7 days):         Last 24 Hours Medication List:   Current Facility-Administered Medications   Medication Dose Route Frequency Provider Last Rate    acetaminophen  650 mg Oral Q6H PRN Olayide D Olaniyan, CRNP      allopurinol  100 mg Oral Daily Olayide D Olaniyan, CRNP      apixaban  5 mg Oral BID Olayide D Olaniyan, CRNP      bisacodyl  10 mg Rectal Daily PRN Olayide D Olaniyan, CRNP      carvedilol  3.125 mg Oral BID With Meals Olayide D Olaniyan, CRNP      colchicine  0.6 mg Oral Daily Olayide D Olaniyan, CRNP      docusate sodium  100 mg Oral BID Olayide D Olaniyan, CRNP      famotidine  40 mg Oral Daily Olayide D Olaniyan, CRNP      montelukast  10 mg Oral QPM Olayide D Olaniyan, CRNP      multi-electrolyte  75 mL/hr Intravenous Continuous Joselyn Reyes Bahamonde, MD 75 mL/hr (07/28/24 0822)    ondansetron  4 mg Intravenous Q6H PRN Olayide D Olaniyan, CRNP      polyethylene glycol  17 g Oral Daily Olayide D Olaniyan, CRNP      risperiDONE  0.25 mg Oral Daily Olayide D Olaniyan, CRNP      senna  1 tablet Oral Daily Olayide D Olaniyan, CRNP      sertraline  25 mg Oral Daily Olayide D Olaniyan, CRNP      sodium chloride  1 g Oral Daily Joselyn Reyes Bahamonde, MD          Today, Patient Was Seen By: Gunjan Fam PA-C    **Please Note: This note may have been constructed using a voice recognition system.**

## 2024-07-28 NOTE — PROGRESS NOTES
NEPHROLOGY PROGRESS NOTE   Kristen Maguire 88 y.o. female MRN: [id number]  Unit/Bed#: [address] [place] [address] Encounter: [id number]  Reason for Consult: Hypercalcemia    ASSESSMENT AND PLAN:  88 y.o.  woman with PMH of GERD, asthma, A-fib, pancreatic/liver cancer p/w weakness.  Nephrology is consulted for management of hypercalcemia.     PLAN        # Hypercalcemia  Etiology : Most likely secondary to malignancy with possible primary hyperparathyroidism with elevated PTH    Ionized calcium 1.2, improved after IV fluids, Bumex, pamidronate, calcitonin  No longer on fluids  Recommend follow-up with NM scan to rule out parathyroid adenoma   , should be suppressed in the settings of hypercalcemia  Can follow-up with endocrinology on discharge     #Volume status/blood press on exam ure :  Volume: Appears dry on exam with dry mucous  Patient is not drinking or eating  Blood pressure: Normotensive, /68mmHg, stable low BP since admission  Recommend:  Recommend Plasma-Lyte 75 mL/h for 5 hours  On Coreg 3.1 twice daily    # Hyponatremia  Serum sodium down to 129 mEq/L  Etiology: Likely secondary to poor solute intake with possible component of SIADH in the settings of malignancy.  Currently dry  Recommend Plasma-Lyte 75 mL/h for 5 hours   Continue sodium chloride 1 g daily for now     # Hypokalemia  Serum potassium 4.9 mg/L borderline low  At goal     # Hepatic/pancreatic lesion  Metastatic malignancy   Patient to pursue palliative care  Management as per primary team     # Exophytic right kidney cyst  Consider outpatient urology     The highlighted and/or bolded points in my assessment, plan, and disposition were discussed with the primary team and they agree with those points and the plan.  Previous records were personally reviewed by me to obtain a baseline creatinine.   The images (CXR) were personally reviewed by me in PACS      SUBJECTIVE:  Patient seen and examined at bedside.  Patient is disoriented, talking to  herself     OBJECTIVE:  Current Weight: Weight - Scale: 95.5 kg (210 lb 9.6 oz)  Vitals:    07/28/24 0333   BP: 109/68   Pulse: 69   Resp: 22   Temp: 98.1 °F (36.7 °C)   SpO2: 91%     No intake or output data in the 24 hours ending 07/28/24 0540  Wt Readings from Last 3 Encounters:   07/22/24 95.5 kg (210 lb 9.6 oz)   04/12/24 94.8 kg (209 lb)   04/04/24 94.8 kg (209 lb)     Temp Readings from Last 3 Encounters:   07/28/24 98.1 °F (36.7 °C) (Oral)   04/12/24 (!) 97.4 °F (36.3 °C)   02/20/24 97.7 °F (36.5 °C) (Oral)     BP Readings from Last 3 Encounters:   07/28/24 109/68   04/12/24 122/58   04/04/24 131/71     Pulse Readings from Last 3 Encounters:   07/28/24 69   04/12/24 58   04/04/24 60        General:  no acute distress at this time, elderly  Skin:  No acute rash  Eyes:  No scleral icterus and noninjected  ENT:  mucous membranes dry  Neck:  no carotid bruits  Chest:  Clear to auscultation percussion, good respiratory effort, no use of accessory respiratory muscles  CVS:  Regular rate and rhythm without rub   Abdomen:  soft and nontender   Extremities: no significant lower extremity edema  Neuro:  No gross focality  Psych:  Alert , cooperative       Medications:    Current Facility-Administered Medications:     acetaminophen (TYLENOL) tablet 650 mg, 650 mg, Oral, Q6H PRN, Olayide D Olaniyan, CRNP    allopurinol (ZYLOPRIM) tablet 100 mg, 100 mg, Oral, Daily, Olayide D Olaniyan, CRNP, 100 mg at 07/27/24 0838    apixaban (ELIQUIS) tablet 5 mg, 5 mg, Oral, BID, Olayide D Olaniyan, CRNP, 5 mg at 07/27/24 2109    bisacodyl (DULCOLAX) rectal suppository 10 mg, 10 mg, Rectal, Daily PRN, Olayide D Olaniyan, CRNP    carvedilol (COREG) tablet 3.125 mg, 3.125 mg, Oral, BID With Meals, Olayide D Olaniyan, CRNP, 3.125 mg at 07/27/24 1705    colchicine (COLCRYS) tablet 0.6 mg, 0.6 mg, Oral, Daily, Olayide D Olaniyan, CRNP, 0.6 mg at 07/27/24 0837    docusate sodium (COLACE) capsule 100 mg, 100 mg, Oral, BID, Olsriniide D  Olaniyan, CRNP, 100 mg at 07/27/24 1705    famotidine (PEPCID) tablet 40 mg, 40 mg, Oral, Daily, Olayide D Olaniyan, CRNP, 40 mg at 07/27/24 0837    montelukast (SINGULAIR) tablet 10 mg, 10 mg, Oral, QPM, Olayide D Olaniyan, CRNP, 10 mg at 07/27/24 1705    ondansetron (ZOFRAN) injection 4 mg, 4 mg, Intravenous, Q6H PRN, Olayide D Olaniyan, CRNP, 4 mg at 07/23/24 0507    polyethylene glycol (MIRALAX) packet 17 g, 17 g, Oral, Daily, Olayide D Olaniyan, CRNP, 17 g at 07/24/24 0858    risperiDONE (RisperDAL) tablet 0.25 mg, 0.25 mg, Oral, Daily, Olayide D Olaniyan, CRNP, 0.25 mg at 07/27/24 0838    senna (SENOKOT) tablet 8.6 mg, 1 tablet, Oral, Daily, Olayide D Olaniyan, CRNP, 8.6 mg at 07/27/24 0839    sertraline (ZOLOFT) tablet 25 mg, 25 mg, Oral, Daily, Olayide D Olaniyan, CRNP, 25 mg at 07/27/24 0839    sodium chloride tablet 1 g, 1 g, Oral, Daily, Joselyn Reyes Bahamonde, MD    tolvaptan (Samsca) split tablet 7.5 mg, 7.5 mg, Oral, Once, Joselyn Reyes Bahamonde, MD    Laboratory Results:  Results from last 7 days   Lab Units 07/28/24  0350 07/27/24  0354 07/26/24  0529 07/25/24  2246 07/25/24  0500 07/24/24  0543 07/23/24  0503 07/22/24  1212   WBC Thousand/uL 13.99* 13.27* 12.16*  --  14.77* 12.87* 13.82* 13.84*   HEMOGLOBIN g/dL 9.5* 9.6* 9.1* 10.0* 9.5* 9.2* 9.6* 11.0*   HEMATOCRIT % 31.1* 30.4* 30.3* 32.4* 31.4* 31.0* 31.8* 36.0   PLATELETS Thousands/uL 354 419* 395*  --  417* 418* 460* 498*   SODIUM mmol/L 129* 132* 134*  --  133* 133* 131* 133*   POTASSIUM mmol/L 4.9 3.6 2.9*  --  3.3* 3.9 3.2* 3.6   CHLORIDE mmol/L 102 103 104  --  104 103 100 98   CO2 mmol/L 21 24 26  --  25 26 24 27   BUN mg/dL 11 10 10  --  10 12 17 19   CREATININE mg/dL 0.74 0.65 0.66  --  0.62 0.67 0.78 0.89   CALCIUM mg/dL 9.2 9.4 9.8  --  10.1 10.5* 11.6* 13.5*   MAGNESIUM mg/dL  --   --   --   --   --   --  2.0 2.2       CT abdomen pelvis wo contrast   Final Result by Max Kaye MD (07/22 8427)      Fluid-filled distal  "esophagus.   The stomach is unremarkable without hernia.   No evidence of obstruction.      Stable appearance of an anterior pelvic abdominal wall fat-containing hernia with similar infiltration of the involved herniated fat.   Clinical correlation advised for focal pain and reducibility.      Enlarging central left hepatic lesion, not well evaluated on this noncontrast exam.   Interval decrease in size of a exophytic irregular lesion from the right kidney upper pole, possibly representing an involuting cyst component.   Grossly stable cystic pancreatic lesions as described, with large ill-defined lesion in the pancreatic head.      New enlarged abdominal nodes as described, measuring up to 3 cm.   New 5 mm left lower lobe pulmonary nodule.      Overall the above findings are consistent with worsening malignancy and considerably worsening metastatic disease.   Limit evaluation on this noncontrast exam.   Postcontrast MRI versus CT recommended for further evaluation.         The study was marked in EPIC for immediate notification.      Workstation performed: [id number]         XR chest 1 view   Final Result by Devyn Scanlon MD (07/22 8285)      Prominent left paramediastinal density which may be accentuated due to positioning. Known left thyroid goiter. Follow-up with be recommended.      The study was marked in EPIC for immediate notification.      Workstation performed: [id number]         CT head without contrast   Final Result by Laci Miller MD (07/22 1400)      No acute intracranial abnormality.                  Workstation performed: [id number]             Portions of the record may have been created with voice recognition software. Occasional wrong word or \"sound a like\" substitutions may have occurred due to the inherent limitations of voice recognition software. Read the chart carefully and recognize, using context, where substitutions have occurred.    "

## 2024-07-29 VITALS
TEMPERATURE: 98.2 F | WEIGHT: 210.6 LBS | HEIGHT: 64 IN | BODY MASS INDEX: 35.96 KG/M2 | DIASTOLIC BLOOD PRESSURE: 56 MMHG | SYSTOLIC BLOOD PRESSURE: 125 MMHG | RESPIRATION RATE: 18 BRPM | OXYGEN SATURATION: 86 % | HEART RATE: 60 BPM

## 2024-07-29 LAB
ALBUMIN SERPL BCG-MCNC: 2.4 G/DL (ref 3.5–5)
ALP SERPL-CCNC: 69 U/L (ref 34–104)
ALT SERPL W P-5'-P-CCNC: 8 U/L (ref 7–52)
ANION GAP SERPL CALCULATED.3IONS-SCNC: 6 MMOL/L (ref 4–13)
AST SERPL W P-5'-P-CCNC: 14 U/L (ref 13–39)
BILIRUB SERPL-MCNC: 0.56 MG/DL (ref 0.2–1)
BUN SERPL-MCNC: 11 MG/DL (ref 5–25)
CA-I BLD-SCNC: 1.25 MMOL/L (ref 1.12–1.32)
CALCIUM ALBUM COR SERPL-MCNC: 10.1 MG/DL (ref 8.3–10.1)
CALCIUM SERPL-MCNC: 8.8 MG/DL (ref 8.4–10.2)
CHLORIDE SERPL-SCNC: 101 MMOL/L (ref 96–108)
CO2 SERPL-SCNC: 22 MMOL/L (ref 21–32)
CREAT SERPL-MCNC: 0.64 MG/DL (ref 0.6–1.3)
ERYTHROCYTE [DISTWIDTH] IN BLOOD BY AUTOMATED COUNT: 17.2 % (ref 11.6–15.1)
GFR SERPL CREATININE-BSD FRML MDRD: 80 ML/MIN/1.73SQ M
GLUCOSE SERPL-MCNC: 102 MG/DL (ref 65–140)
HCT VFR BLD AUTO: 28.7 % (ref 34.8–46.1)
HGB BLD-MCNC: 9.1 G/DL (ref 11.5–15.4)
MCH RBC QN AUTO: 25.1 PG (ref 26.8–34.3)
MCHC RBC AUTO-ENTMCNC: 31.7 G/DL (ref 31.4–37.4)
MCV RBC AUTO: 79 FL (ref 82–98)
PLATELET # BLD AUTO: 379 THOUSANDS/UL (ref 149–390)
PMV BLD AUTO: 10 FL (ref 8.9–12.7)
POTASSIUM SERPL-SCNC: 3.2 MMOL/L (ref 3.5–5.3)
PROT SERPL-MCNC: 5.2 G/DL (ref 6.4–8.4)
RBC # BLD AUTO: 3.63 MILLION/UL (ref 3.81–5.12)
SARS-COV-2 RNA RESP QL NAA+PROBE: NEGATIVE
SODIUM SERPL-SCNC: 129 MMOL/L (ref 135–147)
WBC # BLD AUTO: 13.49 THOUSAND/UL (ref 4.31–10.16)

## 2024-07-29 PROCEDURE — 82330 ASSAY OF CALCIUM: CPT | Performed by: INTERNAL MEDICINE

## 2024-07-29 PROCEDURE — 99232 SBSQ HOSP IP/OBS MODERATE 35: CPT | Performed by: INTERNAL MEDICINE

## 2024-07-29 PROCEDURE — 85027 COMPLETE CBC AUTOMATED: CPT | Performed by: INTERNAL MEDICINE

## 2024-07-29 PROCEDURE — 80053 COMPREHEN METABOLIC PANEL: CPT | Performed by: INTERNAL MEDICINE

## 2024-07-29 PROCEDURE — 99239 HOSP IP/OBS DSCHRG MGMT >30: CPT

## 2024-07-29 PROCEDURE — 87635 SARS-COV-2 COVID-19 AMP PRB: CPT

## 2024-07-29 RX ORDER — CARVEDILOL 3.12 MG/1
3.12 TABLET ORAL 2 TIMES DAILY WITH MEALS
Start: 2024-07-29

## 2024-07-29 RX ORDER — COLCHICINE 0.6 MG/1
0.6 TABLET ORAL DAILY
Start: 2024-07-30

## 2024-07-29 RX ORDER — SENNOSIDES 8.6 MG
8.6 TABLET ORAL
Start: 2024-07-29

## 2024-07-29 RX ORDER — POTASSIUM CHLORIDE 20MEQ/15ML
40 LIQUID (ML) ORAL ONCE
Status: COMPLETED | OUTPATIENT
Start: 2024-07-29 | End: 2024-07-29

## 2024-07-29 RX ORDER — POTASSIUM CHLORIDE 20 MEQ/1
40 TABLET, EXTENDED RELEASE ORAL ONCE
Status: DISCONTINUED | OUTPATIENT
Start: 2024-07-29 | End: 2024-07-29

## 2024-07-29 RX ORDER — SODIUM CHLORIDE 1 G/1
1 TABLET ORAL DAILY
Start: 2024-07-30

## 2024-07-29 RX ORDER — POLYETHYLENE GLYCOL 3350 17 G/17G
17 POWDER, FOR SOLUTION ORAL DAILY
Start: 2024-07-30

## 2024-07-29 RX ADMIN — APIXABAN 5 MG: 5 TABLET, FILM COATED ORAL at 08:47

## 2024-07-29 RX ADMIN — COLCHICINE 0.6 MG: 0.6 TABLET ORAL at 08:45

## 2024-07-29 RX ADMIN — RISPERIDONE 0.25 MG: 0.25 TABLET, FILM COATED ORAL at 08:46

## 2024-07-29 RX ADMIN — POTASSIUM CHLORIDE 40 MEQ: 20 SOLUTION ORAL at 09:53

## 2024-07-29 RX ADMIN — ALLOPURINOL 100 MG: 100 TABLET ORAL at 08:46

## 2024-07-29 RX ADMIN — SERTRALINE HYDROCHLORIDE 25 MG: 25 TABLET ORAL at 08:46

## 2024-07-29 RX ADMIN — SENNOSIDES 8.6 MG: 8.6 TABLET, FILM COATED ORAL at 08:46

## 2024-07-29 RX ADMIN — CARVEDILOL 3.12 MG: 3.12 TABLET, FILM COATED ORAL at 08:46

## 2024-07-29 RX ADMIN — CARVEDILOL 3.12 MG: 3.12 TABLET, FILM COATED ORAL at 16:44

## 2024-07-29 RX ADMIN — DOCUSATE SODIUM 100 MG: 100 CAPSULE, LIQUID FILLED ORAL at 08:46

## 2024-07-29 RX ADMIN — FAMOTIDINE 40 MG: 20 TABLET, FILM COATED ORAL at 08:46

## 2024-07-29 RX ADMIN — MONTELUKAST 10 MG: 10 TABLET, FILM COATED ORAL at 18:07

## 2024-07-29 NOTE — CASE MANAGEMENT
Case Management Progress Note    Patient name Kristen Maguire  Location [address] [place] [address] 411-* MRN [id number]  : 1935 Date 2024       LOS (days): 7  Geometric Mean LOS (GMLOS) (days): 2.6  Days to GMLOS:-4.4        OBJECTIVE:        Current admission status: Inpatient  Preferred Pharmacy:   [facility] - [place], [place] - [address]  [address]  [place] [address]  Phone: [phone] Fax: [phone]    Primary Care Provider: John Nguyen DO    Primary Insurance: MEDICARE  Secondary Insurance: [facility]    PROGRESS NOTE:    CM called [facility] ([phone]) to make facility aware that patient's transportation has been delayed (originally a 1515  time was confirmed) however it is now 1700 and patient has still not been picked up. CM left a  for Agustina ARMIJO making her aware of the delay. CM also spoke directly with [facility] staff making them aware of transport delay. [facility] staff made CM aware that patient can arrive late as long as she arrives at the facility by . CM made transportation and nursing aware.

## 2024-07-29 NOTE — ASSESSMENT & PLAN NOTE
Discussed with patient findings on CT A/P in regards to her progression of cancer. Reports she follows with [facility] and was previously on therapies, but stopped due to multiple side effects. Patient at this time isn't interested in resuming chemo/radiation therapies. Discussed that her physical decline is due to her worsening cancer causing electrolyte abnormalities and weakness. Then explained that if she doesn't want to pursue therapy for cancer, we should consider transition to palliative and hospice care.   Previous provider discussed with patient's daughter the idea of hospice.  After further discussion with family today 7/26, family is not ready to transition to hospice at this time.  CM notified - STR to possible long term

## 2024-07-29 NOTE — PLAN OF CARE
Problem: Prexisting or High Potential for Compromised Skin Integrity  Goal: Skin integrity is maintained or improved  Description: INTERVENTIONS:  - Identify patients at risk for skin breakdown  - Assess and monitor skin integrity  - Assess and monitor nutrition and hydration status  - Monitor labs   - Assess for incontinence   - Turn and reposition patient  - Assist with mobility/ambulation  - Relieve pressure over bony prominences  - Avoid friction and shearing  - Provide appropriate hygiene as needed including keeping skin clean and dry  - Evaluate need for skin moisturizer/barrier cream  - Collaborate with interdisciplinary team   - Patient/family teaching  - Consider wound care consult   Outcome: Progressing     Problem: GASTROINTESTINAL - ADULT  Goal: Minimal or absence of nausea and/or vomiting  Description: INTERVENTIONS:  - Administer IV fluids if ordered to ensure adequate hydration  - Maintain NPO status until nausea and vomiting are resolved  - Nasogastric tube if ordered  - Administer ordered antiemetic medications as needed  - Provide nonpharmacologic comfort measures as appropriate  - Advance diet as tolerated, if ordered  - Consider nutrition services referral to assist patient with adequate nutrition and appropriate food choices  Outcome: Progressing  Goal: Maintains or returns to baseline bowel function  Description: INTERVENTIONS:  - Assess bowel function  - Encourage oral fluids to ensure adequate hydration  - Administer IV fluids if ordered to ensure adequate hydration  - Administer ordered medications as needed  - Encourage mobilization and activity  - Consider nutritional services referral to assist patient with adequate nutrition and appropriate food choices  Outcome: Progressing  Goal: Maintains adequate nutritional intake  Description: INTERVENTIONS:  - Monitor percentage of each meal consumed  - Identify factors contributing to decreased intake, treat as appropriate  - Assist with meals  as needed  - Monitor I&O, weight, and lab values if indicated  - Obtain nutrition services referral as needed  Outcome: Progressing  Goal: Establish and maintain optimal ostomy function  Description: INTERVENTIONS:  - Assess bowel function  - Encourage oral fluids to ensure adequate hydration  - Administer IV fluids if ordered to ensure adequate hydration   - Administer ordered medications as needed  - Encourage mobilization and activity  - Nutrition services referral to assist patient with appropriate food choices  - Assess stoma site  - Consider wound care consult   Outcome: Progressing  Goal: Oral mucous membranes remain intact  Description: INTERVENTIONS  - Assess oral mucosa and hygiene practices  - Implement preventative oral hygiene regimen  - Implement oral medicated treatments as ordered  - Initiate Nutrition services referral as needed  Outcome: Progressing     Problem: PAIN - ADULT  Goal: Verbalizes/displays adequate comfort level or baseline comfort level  Description: Interventions:  - Encourage patient to monitor pain and request assistance  - Assess pain using appropriate pain scale  - Administer analgesics based on type and severity of pain and evaluate response  - Implement non-pharmacological measures as appropriate and evaluate response  - Consider cultural and social influences on pain and pain management  - Notify physician/advanced practitioner if interventions unsuccessful or patient reports new pain  Outcome: Progressing     Problem: MUSCULOSKELETAL - ADULT  Goal: Maintain or return mobility to safest level of function  Description: INTERVENTIONS:  - Assess patient's ability to carry out ADLs; assess patient's baseline for ADL function and identify physical deficits which impact ability to perform ADLs (bathing, care of mouth/teeth, toileting, grooming, dressing, etc.)  - Assess/evaluate cause of self-care deficits   - Assess range of motion  - Assess patient's mobility  - Assess patient's  need for assistive devices and provide as appropriate  - Encourage maximum independence but intervene and supervise when necessary  - Involve family in performance of ADLs  - Assess for home care needs following discharge   - Consider OT consult to assist with ADL evaluation and planning for discharge  - Provide patient education as appropriate  Outcome: Progressing  Goal: Maintain proper alignment of affected body part  Description: INTERVENTIONS:  - Support, maintain and protect limb and body alignment  - Provide patient/ family with appropriate education  Outcome: Progressing     Problem: INFECTION - ADULT  Goal: Absence or prevention of progression during hospitalization  Description: INTERVENTIONS:  - Assess and monitor for signs and symptoms of infection  - Monitor lab/diagnostic results  - Monitor all insertion sites, i.e. indwelling lines, tubes, and drains  - Monitor endotracheal if appropriate and nasal secretions for changes in amount and color  - [place] appropriate cooling/warming therapies per order  - Administer medications as ordered  - Instruct and encourage patient and family to use good hand hygiene technique  - Identify and instruct in appropriate isolation precautions for identified infection/condition  Outcome: Progressing  Goal: Absence of fever/infection during neutropenic period  Description: INTERVENTIONS:  - Monitor WBC    Outcome: Progressing     Problem: SAFETY ADULT  Goal: Patient will remain free of falls  Description: INTERVENTIONS:  - Educate patient/family on patient safety including physical limitations  - Instruct patient to call for assistance with activity   - Consult OT/PT to assist with strengthening/mobility   - Keep Call bell within reach  - Keep bed low and locked with side rails adjusted as appropriate  - Keep care items and personal belongings within reach  - Initiate and maintain comfort rounds  - Make Fall Risk Sign visible to staff  - Offer Toileting every *** Hours,  in advance of need  - Initiate/Maintain ***alarm  - Obtain necessary fall risk management equipment: ***  - Apply yellow socks and bracelet for high fall risk patients  - Consider moving patient to room near nurses station  Outcome: Progressing  Goal: Maintain or return to baseline ADL function  Description: INTERVENTIONS:  -  Assess patient's ability to carry out ADLs; assess patient's baseline for ADL function and identify physical deficits which impact ability to perform ADLs (bathing, care of mouth/teeth, toileting, grooming, dressing, etc.)  - Assess/evaluate cause of self-care deficits   - Assess range of motion  - Assess patient's mobility; develop plan if impaired  - Assess patient's need for assistive devices and provide as appropriate  - Encourage maximum independence but intervene and supervise when necessary  - Involve family in performance of ADLs  - Assess for home care needs following discharge   - Consider OT consult to assist with ADL evaluation and planning for discharge  - Provide patient education as appropriate  Outcome: Progressing  Goal: Maintains/Returns to pre admission functional level  Description: INTERVENTIONS:  - Perform AM-PAC 6 Click Basic Mobility/ Daily Activity assessment daily.  - Set and communicate daily mobility goal to care team and patient/family/caregiver.   - Collaborate with rehabilitation services on mobility goals if consulted  - Perform Range of Motion *** times a day.  - Reposition patient every *** hours.  - Dangle patient *** times a day  - Stand patient *** times a day  - Ambulate patient *** times a day  - Out of bed to chair *** times a day   - Out of bed for meals *** times a day  - Out of bed for toileting  - Record patient progress and toleration of activity level   Outcome: Progressing     Problem: DISCHARGE PLANNING  Goal: Discharge to home or other facility with appropriate resources  Description: INTERVENTIONS:  - Identify barriers to discharge w/patient  and caregiver  - Arrange for needed discharge resources and transportation as appropriate  - Identify discharge learning needs (meds, wound care, etc.)  - Arrange for interpretive services to assist at discharge as needed  - Refer to Case Management Department for coordinating discharge planning if the patient needs post-hospital services based on physician/advanced practitioner order or complex needs related to functional status, cognitive ability, or social support system  Outcome: Progressing     Problem: Knowledge Deficit  Goal: Patient/family/caregiver demonstrates understanding of disease process, treatment plan, medications, and discharge instructions  Description: Complete learning assessment and assess knowledge base.  Interventions:  - Provide teaching at level of understanding  - Provide teaching via preferred learning methods  Outcome: Progressing     Problem: NEUROSENSORY - ADULT  Goal: Achieves maximal functionality and self care  Description: INTERVENTIONS  - Monitor swallowing and airway patency with patient fatigue and changes in neurological status  - Encourage and assist patient to increase activity and self care.   - Encourage visually impaired, hearing impaired and aphasic patients to use assistive/communication devices  Outcome: Progressing     Problem: GENITOURINARY - ADULT  Goal: Maintains or returns to baseline urinary function  Description: INTERVENTIONS:  - Assess urinary function  - Encourage oral fluids to ensure adequate hydration if ordered  - Administer IV fluids as ordered to ensure adequate hydration  - Administer ordered medications as needed  - Offer frequent toileting  - Follow urinary retention protocol if ordered  Outcome: Progressing  Goal: Absence of urinary retention  Description: INTERVENTIONS:  - Assess patient’s ability to void and empty bladder  - Monitor I/O  - Bladder scan as needed  - Discuss with physician/AP medications to alleviate retention as needed  - Discuss  catheterization for long term situations as appropriate  Outcome: Progressing     Problem: METABOLIC, FLUID AND ELECTROLYTES - ADULT  Goal: Electrolytes maintained within normal limits  Description: INTERVENTIONS:  - Monitor labs and assess patient for signs and symptoms of electrolyte imbalances  - Administer electrolyte replacement as ordered  - Monitor response to electrolyte replacements, including repeat lab results as appropriate  - Instruct patient on fluid and nutrition as appropriate  Outcome: Progressing  Goal: Fluid balance maintained  Description: INTERVENTIONS:  - Monitor labs   - Monitor I/O and WT  - Instruct patient on fluid and nutrition as appropriate  - Assess for signs & symptoms of volume excess or deficit  Outcome: Progressing  Goal: Glucose maintained within target range  Description: INTERVENTIONS:  - Monitor Blood Glucose as ordered  - Assess for signs and symptoms of hyperglycemia and hypoglycemia  - Administer ordered medications to maintain glucose within target range  - Assess nutritional intake and initiate nutrition service referral as needed  Outcome: Progressing     Problem: SKIN/TISSUE INTEGRITY - ADULT  Goal: Skin Integrity remains intact(Skin Breakdown Prevention)  Description: Assess:  -Perform Rudolph assessment every ***  -Clean and moisturize skin every ***  -Inspect skin when repositioning, toileting, and assisting with ADLS  -Assess under medical devices such as *** every ***  -Assess extremities for adequate circulation and sensation     Bed Management:  -Have minimal linens on bed & keep smooth, unwrinkled  -Change linens as needed when moist or perspiring  -Avoid sitting or lying in one position for more than *** hours while in bed  -Keep HOB at ***degrees     Toileting:  -Offer bedside commode  -Assess for incontinence every ***  -Use incontinent care products after each incontinent episode such as ***    Activity:  -Mobilize patient *** times a day  -Encourage activity  and walks on unit  -Encourage or provide ROM exercises   -Turn and reposition patient every *** Hours  -Use appropriate equipment to lift or move patient in bed  -Instruct/ Assist with weight shifting every *** when out of bed in chair  -Consider limitation of chair time *** hour intervals    Skin Care:  -Avoid use of baby powder, tape, friction and shearing, hot water or constrictive clothing  -Relieve pressure over bony prominences using ***  -Do not massage red bony areas    Next Steps:  -Teach patient strategies to minimize risks such as ***   -Consider consults to  interdisciplinary teams such as ***  Outcome: Progressing     Problem: Nutrition/Hydration-ADULT  Goal: Nutrient/Hydration intake appropriate for improving, restoring or maintaining nutritional needs  Description: Monitor and assess patient's nutrition/hydration status for malnutrition. Collaborate with interdisciplinary team and initiate plan and interventions as ordered.  Monitor patient's weight and dietary intake as ordered or per policy. Utilize nutrition screening tool and intervene as necessary. Determine patient's food preferences and provide high-protein, high-caloric foods as appropriate.     INTERVENTIONS:  - Monitor oral intake, urinary output, labs, and treatment plans  - Assess nutrition and hydration status and recommend course of action  - Evaluate amount of meals eaten  - Assist patient with eating if necessary   - Allow adequate time for meals  - Recommend/ encourage appropriate diets, oral nutritional supplements, and vitamin/mineral supplements  - Order, calculate, and assess calorie counts as needed  - Recommend, monitor, and adjust tube feedings and TPN/PPN based on assessed needs  - Assess need for intravenous fluids  - Provide specific nutrition/hydration education as appropriate  - Include patient/family/caregiver in decisions related to nutrition  Outcome: Progressing

## 2024-07-29 NOTE — ASSESSMENT & PLAN NOTE
7/22 - presented to the ED with complaints of a few days of vomiting and poor oral intake. Reports abdominal pain and generalized weakness also for a few days.  Reports of more confusion than usual  Chest x-ray showed pulm left paramediastinal density which will be accentuated due to positioning and known left thyroid goiter  CT abdomen/pelvis without contrast demonstrated worsening malignancy and considerably worsening metastatic disease compared to CT abdomen/pelvis of 1/13/2024.  CT head without contrast with no evidence of acute intracranial abnormality  Low level ammonia at 15  Respiratory panel negative  UA with leukocytes, nitrites negative and she is asymptomatic   Noted to have hypercalcemia, see treatment above  PT/OT eval and treat- Accepted to [facility].  Patient will be discharged to [facility] today 7/29

## 2024-07-29 NOTE — NURSING NOTE
Patient was taken by wheelchair to the [facility] facility via [facility] transport.  Patient left with all of her belongings.  Questions answered, follow-up meds and appointments explained.    Kristen was a theresa patient and a staff favorite.

## 2024-07-29 NOTE — ASSESSMENT & PLAN NOTE
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 Will need follow up with Oncology outpatient at [facility]

## 2024-07-29 NOTE — CASE MANAGEMENT
Case Management Discharge Planning Note    Patient name Kristen Maguire  Location [address] [place] [address]/[address] [place] [address]-* MRN [id number]  : 1935 Date 2024       Current Admission Date: 2024  Current Admission Diagnosis:Hypercalcemia   Patient Active Problem List    Diagnosis Date Noted Date Diagnosed    Dementia (HCC) 2024     Goals of care, counseling/discussion 2024     History of gout 2024     Hypokalemia 2024     Hyponatremia 2024     Abnormal CT of the abdomen 2024     Hypercalcemia 2024     Constipation 2024     Dysphagia 2024     Generalized weakness 10/26/2023     COVID-19 virus infection 10/26/2023     Leukocytosis 10/26/2023     Thrombocytosis 10/26/2023     Atrial fibrillation (HCC) 10/26/2023     GERD (gastroesophageal reflux disease) 10/26/2023     Psychiatric disorder 10/26/2023     History of asthma 10/26/2023       LOS (days): 7  Geometric Mean LOS (GMLOS) (days): 2.6  Days to GMLOS:-4.2     OBJECTIVE:  Risk of Unplanned Readmission Score: 27.48     Current admission status: Inpatient   Preferred Pharmacy:   [facility] [place] [address] [address] [facility] [address]  Phone: [phone] Fax: [phone]    Primary Care Provider: John Nguyen DO    Primary Insurance: MEDICARE  Secondary Insurance: [facility]    DISCHARGE DETAILS:    Discharge planning discussed with:: Patient's Daughter Yomaira  Freedom of Choice: Yes  Comments - Freedom of Choice: Patient's Daughter Yomaira stated facility of choice for patient is [facility].  CM contacted family/caregiver?: Yes  Were Treatment Team discharge recommendations reviewed with patient/caregiver?: Yes    Contacts  Patient Contacts: Yomaira-daughter  Contact Method: Phone  Phone Number: [phone]  Reason/Outcome: Continuity of Care, Discharge Planning    Other Referral/Resources/Interventions Provided:  Referral Comments: [facility]  [facility] in [facility]. CM requested [facility] transportation in [place].      Transport at Discharge : Wheelchair van  Dispatcher Contacted: Yes  Number/Name of Dispatcher: [employer]  Transported by (Company and Unit #): TBD  ETA of Transport (Date): 07/29/24    Transfer Mode: Wheelchair    IMM Given (Date):: 07/29/24 (IMM was reviewed with patient's daughter Yomaira on phone. Yomaira verbalized understanding and original was placed in patient's room bedside. Copy placed in scan bin for patient's chart.)  IMM Given to:: Patient  Family notified:: Daughter Yomaira       Accepting Facility Name, City & State : [facility]  Receiving Facility/Agency Phone Number: [phone]  Facility/Agency Fax Number: [phone]    CM called and spoke with patient's Daughter Yomaira to make her aware patient has been medically cleared for discharge. CM made Yomaira aware that transportation will be arranged to take patient to chose [facility] facility [facility]. Yomaira verbalized her understanding. KARON made NP and nursing aware of confirmed  time by Mahendra of 1825.

## 2024-07-29 NOTE — DISCHARGE SUMMARY
[facility]  Progress Note  Name: Kristen Maguire I  MRN: [id number]  Unit/Bed#: 4 [address] I Date of Admission: 7/22/2024   Date of Service: 7/29/2024 I Hospital Day: 7    Assessment & Plan   Generalized weakness  Assessment & Plan  7/22 - presented to the ED with complaints of a few days of vomiting and poor oral intake. Reports abdominal pain and generalized weakness also for a few days.  Reports of more confusion than usual  Chest x-ray showed pulm left paramediastinal density which will be accentuated due to positioning and known left thyroid goiter  CT abdomen/pelvis without contrast demonstrated worsening malignancy and considerably worsening metastatic disease compared to CT abdomen/pelvis of 1/13/2024.  CT head without contrast with no evidence of acute intracranial abnormality  Low level ammonia at 15  Respiratory panel negative  UA with leukocytes, nitrites negative and she is asymptomatic   Noted to have hypercalcemia, see treatment above  PT/OT eval and treat- Accepted to [facility].  Patient will be discharged to [facility] today 7/29      * Hypercalcemia  Assessment & Plan  As evidenced by calcium 13.5 and ionized 1.69 on admission   Likely secondary to worsening malignancy as evidenced by findings on CT abdomen/pelvis which showed worsening malignancy and considerably worsening metastatic disease compared to CT abdomen/pelvis of 1/13/2024.  Nephrology consulted, recommendations are appreciated  S/p Calcitonin 100mg IM x 2 doses  S/p pamidronate 30mg IV x 1 dose on 7/25  PTH elevated at 102.5  Spoke with nephrology who is recommending NM scan to rule out PTH adenoma -this can be done outpatient.  7/26 discontinued IVF and Bumex  Calcium improved. Monitor daily CMP, ionized calcium      Dementia (HCC)  Assessment & Plan  Mental status waxes and wanes per family  Supportive care  Delirium precautions    Goals of care, counseling/discussion  Assessment & Plan  Discussed  with patient findings on CT A/P in regards to her progression of cancer. Reports she follows with [facility] and was previously on therapies, but stopped due to multiple side effects. Patient at this time isn't interested in resuming chemo/radiation therapies. Discussed that her physical decline is due to her worsening cancer causing electrolyte abnormalities and weakness. Then explained that if she doesn't want to pursue therapy for cancer, we should consider transition to palliative and hospice care.   Previous provider discussed with patient's daughter the idea of hospice.  After further discussion with family today 7/26, family is not ready to transition to hospice at this time.  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 Will need follow up with Oncology outpatient at [facility]    Hyponatremia  Assessment & Plan  Serum sodium currently at 129  Likely in the setting of poor solute intake vs SIADH in the setting of malignancy  Urine studies ordered  Started on plasma-lyte 75ml/hr for 5 hours  Continue sodium chloride 1g QD  Nephrology input appreciated    History of gout  Assessment & Plan  Continue Allopurinol/Colchicine    Psychiatric disorder  Assessment & Plan  Continue Risperdal/Zoloft    GERD (gastroesophageal reflux disease)  Assessment & Plan  Continue Pepcid    Atrial fibrillation (HCC)  Assessment & Plan  S/P pacemaker  On Coreg for rate control  On Eliquis for anticoagulation    Thrombocytosis  Assessment & Plan  Likely reactive  Monitor platelet count    Leukocytosis  Assessment & Plan  Likely reactive due to acute medical issue(s)/cancer and degree of hemoconcentration from hypercalcemia requiring IV fluids  Remains afebrile  Monitor WBC  count             Medical Problems       Resolved Problems  Date Reviewed: 7/29/2024   None       Discharging Physician / Practitioner: HOPE Coronado  PCP: John Nguyen DO  Admission Date:   Admission Orders (From admission, onward)       Ordered        07/22/24 1635  INPATIENT ADMISSION  Once                          Discharge Date: 07/29/24    Consultations During Hospital Stay:  Hematology/oncology, nephrology    Procedures Performed:   CT head without contrast: No acute intracranial abnormality  Chest x-ray:Prominent left paramediastinal density which may be accentuated due to positioning. Known left thyroid goiter.   CT abdomen/pelvis without contrast:Consistent with worsening malignancy and considerably worsening metastatic disease.     Significant Findings / Test Results:   As noted above    Test Results Pending at Discharge (will require follow up):   None       Reason for Admission: Generalized weakness    Hospital Course:   Kristen Maguire is a 88 y.o. female patient who originally presented to the hospital on 7/22/2024 due to above.CT abdomen/pelvis revealed worsening malignancy with mets. Lab work revealed calcium of 13.5 and ionized calcium 1.69. ED gave calcitonin 380 units IM and 1 L bolus NSS.   Nephrology was consulted for management of hypercalcemia and hematology oncology for worsening malignancy.  Received calcitonin 100 mg IM x 2 doses, pamidronate 30 mg IV x 1 dose on 7/25 with improvement.  Upon discharge, total serum calcium and ionized calcium levels are normal potassium and magnesium were replaced and patient was started on sodium chloride tablets 1 g daily for hyponatremia.  Patient is being discharged to [place] will follow-up with nephrology and hematology oncology.      Please see above list of diagnoses and related plan for additional information.     Condition at Discharge: stable    Discharge Day Visit / Exam:     Subjective: Seen and examined resting  "comfortably in bed.  Denies any acute complaints.    Vitals: Blood Pressure: 119/53 (07/29/24 0826)  Pulse: 60 (07/29/24 0826)  Temperature: 98.3 °F (36.8 °C) (07/29/24 0826)  Temp Source: Oral (07/29/24 0318)  Respirations: 18 (07/29/24 0318)  Height: 5' 4\" (162.6 cm) (07/22/24 2205)  Weight - Scale: 95.5 kg (210 lb 9.6 oz) (07/22/24 2205)  SpO2: (!) 86 % (07/29/24 0826)    Exam:   Physical Exam  Vitals and nursing note reviewed.   Constitutional:       General: She is not in acute distress.     Appearance: She is well-developed.   HENT:      Head: Normocephalic and atraumatic.   Eyes:      Conjunctiva/sclera: Conjunctivae normal.   Cardiovascular:      Rate and Rhythm: Normal rate and regular rhythm.      Heart sounds: No murmur heard.  Pulmonary:      Effort: Pulmonary effort is normal. No respiratory distress.      Breath sounds: Normal breath sounds.   Abdominal:      Palpations: Abdomen is soft.      Tenderness: There is no abdominal tenderness.   Musculoskeletal:         General: No swelling.      Cervical back: Neck supple.   Skin:     General: Skin is warm and dry.      Capillary Refill: Capillary refill takes less than 2 seconds.   Neurological:      Mental Status: She is alert.   Psychiatric:         Mood and Affect: Mood normal.          Discussion with Family: Attempted to update  (daughter) via phone. Left voicemail.     Discharge instructions/Information to patient and family:   See after visit summary for information provided to patient and family.      Provisions for Follow-Up Care:  See after visit summary for information related to follow-up care and any pertinent home health orders.      Mobility at time of Discharge:   Basic Mobility Inpatient Raw Score: 8  -HLM Goal: 3: Sit at edge of bed  JH-HLM Achieved: 1: Laying in bed       Disposition:   Assisted Living Facility at [facility]    Planned Readmission: No     Discharge Statement:  I spent  minutes discharging the patient. " This time was spent on the day of discharge. I had direct contact with the patient on the day of discharge. Greater than 50% of the total time was spent examining patient, answering all patient questions, arranging and discussing plan of care with patient as well as directly providing post-discharge instructions.  Additional time then spent on discharge activities.    Discharge Medications:  See after visit summary for reconciled discharge medications provided to patient and/or family.      **Please Note: This note may have been constructed using a voice recognition system**

## 2024-07-29 NOTE — [place] UNIVERSAL TRANSFER FORM
"[place] UNIVERSAL TRANSFER FORM  (ALL ITEMS MUST BE COMPLETED)    1. TRANSFER FROM: [facility]      TRANSFER TO: [facility]    2. DATE OF TRANSFER: 7/29/2024                        TIME OF TRANSFER: 1515    3. PATIENT NAME: Kristen Maguire,        YOB: 1935                             GENDER: female    4. LANGUAGE:   English    5. PHYSICIAN NAME:  Shaheed Fenton MD                   PHONE: [phone]    6. CODE STATUS: Level 1 - Full Code        Out of Hospital DNR Attached: No    7. :                                      :  Extended Emergency Contact Information  Primary Emergency Contact: Yomaira Maguire   [place]  Mobile Phone: [phone]  Relation: Daughter  Secondary Emergency Contact: Clari Ramos  Mobile Phone: [phone]  Relation: Care Giver           Health Care Representative/Proxy:  Yes           Legal Guardian:  No             NAME OF:           HEALTH CARE REPRESENTATIVE/PROXY:                                         OR           LEGAL GUARDIAN, IF NOT :                                               PHONE:  (Day)           (Night)                        (Cell)    8. REASON FOR TRANSFER: (Must include brief medical history and recent changes in physical function or cognition.) STR              V/S: /53   Pulse 60   Temp 98.3 °F (36.8 °C)   Resp 18   Ht 5' 4\" (1.626 m)   Wt 95.5 kg (210 lb 9.6 oz)   SpO2 (!) 86%   BMI 36.15 kg/m²           PAIN: None    9. PRIMARY DIAGNOSIS: Hypercalcemia      Secondary Diagnosis:         Pacemaker: No      Internal Defib: No          Mental Health Diagnosis (if Applicable):    10. RESTRAINTS: No     11. RESPIRATORY NEEDS: None    12. ISOLATION/PRECAUTION: None    13. ALLERGY: Aspirin, Chocolate - food allergy, Ciprofloxacin, Doxycycline, Iodinated contrast media, Peanut oil - food allergy, Penicillins, Soy allergy - food " allergy, Soybean oil - food allergy, and Erythromycin base    14. SENSORY:       Vision Good, Hearing Good , and Speech Clear    15. SKIN CONDITION: No Wounds    16. DIET: Special (describe)Clear liquids    17. IV ACCESS: None    18. PERSONAL ITEMS SENT WITH PATIENT: OtherClothing, shoes, phone, bra    19. ATTACHED DOCUMENTS: MUST ATTACH CURRENT MEDICATION INFORMATION Face Sheet and Medication Reconciliation    20. AT RISK ALERTS:Falls        HARM TO: N/A    21. WEIGHT BEARING STATUS:         Left Leg: Full        Right Leg: Full    22. MENTAL STATUS:Alert and Oriented    23. FUNCTION:        Walk: Not Able        Transfer: With Help        Toilet: With Help        Feed: With Help    24. IMMUNIZATIONS/SCREENING:     There is no immunization history on file for this patient.    25. BOWEL: Incontinent     26. BLADDER: Incontinent    27. SENDING FACILITY CONTACT: Piper Jackson                 Title: RN        Unit: 4N        Phone: [phone]          REC'G FACILITY CONTACT (if known):        Title:        Unit:         Phone:         FORM PREFILLED BY (if applicable)       Title:       Unit:        Phone:         FORM COMPLETED BY Piper Jackson      Title: DEBI      Phone: [phone]

## 2024-07-29 NOTE — PROGRESS NOTES
NEPHROLOGY PROGRESS NOTE   Kristen Maguire 88 y.o. female MRN: [id number]  Unit/Bed#: [address] [place] [address] Encounter: [id number]  Reason for Consult: Electrolyte disorders      SUMMARY:    88 y.o.  woman with PMH of GERD, asthma, A-fib, pancreatic/liver cancer p/w weakness.  Nephrology is consulted for management of hypercalcemia.      ASSESSMENT and PLAN:    Hypokalemia  -- Potassium 3.2 this morning  -- This will contribute to the hyponatremia  -- Reports good oral intake  -- Off intravenous fluids and diuretics  -- Magnesium on July 23 was normal  -- Replace potassium with 40 mEq of potassium chloride liquid  -- If patient still inpatient tomorrow check a magnesium level as well    Hypercalcemia--resolved  -- Total serum calcium and ionized calcium level are normal  -- No further indication for intravenous fluids  -- Suspected to be primarily related to malignancy, and cannot rule out a possible primary hyperparathyroidism due to elevated PTH  -- Patient received treatment with intravenous fluids, IV diuretics, IV bisphosphonate and calcitonin  -- Imaging 830 mg IV given on July 25  -- Continue current management.    Hyponatremia  -- Sodium level is stable from yesterday  -- Potassium noted to be low replace the potassium should help address the sodium to a degree  -- Currently on salt tablet 1 g daily  -- Continue current management      SUBJECTIVE / INTERVAL HISTORY:    No complaints.  Having trouble swallowing the salt tablet crushed    OBJECTIVE:  Current Weight: Weight - Scale: 95.5 kg (210 lb 9.6 oz)  Vitals:    07/29/24 0200 07/29/24 0318 07/29/24 0400 07/29/24 0826   BP:  (!) 115/48  119/53   BP Location:  Left arm     Pulse: 63 61 65 60   Resp:  18     Temp:  97.9 °F (36.6 °C)  98.3 °F (36.8 °C)   TempSrc:  Oral     SpO2: 93% 90% 92% (!) 86%   Weight:       Height:           Intake/Output Summary (Last 24 hours) at 7/29/2024 0856  Last data filed at 7/28/2024 1737  Gross per 24 hour   Intake 950 ml   Output  300 ml   Net 650 ml       Review of Systems:    Constitutional: Negative for chills and fever.   HENT: Negative for ear pain and sore throat.    Eyes: Negative for pain and visual disturbance.   Respiratory: Negative for cough and shortness of breath.    Cardiovascular: Negative for chest pain and palpitations.   Gastrointestinal: Negative for abdominal pain and vomiting.   Genitourinary: Negative for dysuria and hematuria.   Musculoskeletal: Negative for arthralgias and back pain.   Skin: Negative for color change and rash.   Neurological: Negative for seizures and syncope.   12 point ROS has been reviewed.    Physical Exam  Vitals and nursing note reviewed. Exam conducted with a chaperone present.   Constitutional:       General: She is not in acute distress.     Appearance: She is well-developed.   HENT:      Head: Normocephalic and atraumatic.   Eyes:      General: No scleral icterus.     Conjunctiva/sclera: Conjunctivae normal.      Pupils: Pupils are equal, round, and reactive to light.   Cardiovascular:      Rate and Rhythm: Normal rate and regular rhythm.      Heart sounds: S1 normal and S2 normal. No murmur heard.     No friction rub. No gallop.   Pulmonary:      Effort: Pulmonary effort is normal. No respiratory distress.      Breath sounds: Normal breath sounds. No wheezing or rales.   Abdominal:      General: Bowel sounds are normal.      Palpations: Abdomen is soft.      Tenderness: There is no abdominal tenderness. There is no rebound.   Musculoskeletal:         General: Normal range of motion.      Cervical back: Normal range of motion and neck supple.   Skin:     Findings: No rash.   Neurological:      Mental Status: She is alert and oriented to person, place, and time.   Psychiatric:         Behavior: Behavior normal.         Medications:    Current Facility-Administered Medications:     acetaminophen (TYLENOL) tablet 650 mg, 650 mg, Oral, Q6H PRN, HOPE Coronado    allopurinol (ZYLOPRIM)  tablet 100 mg, 100 mg, Oral, Daily, Olayide D Olaniyan, CRNP, 100 mg at 07/29/24 0846    apixaban (ELIQUIS) tablet 5 mg, 5 mg, Oral, BID, Olayide D Olaniyan, CRNP, 5 mg at 07/29/24 0847    bisacodyl (DULCOLAX) rectal suppository 10 mg, 10 mg, Rectal, Daily PRN, Olayide D Olaniyan, CRNP    carvedilol (COREG) tablet 3.125 mg, 3.125 mg, Oral, BID With Meals, Olayide D Olaniyan, CRNP, 3.125 mg at 07/29/24 0846    colchicine (COLCRYS) tablet 0.6 mg, 0.6 mg, Oral, Daily, Olayide D Olaniyan, CRNP, 0.6 mg at 07/29/24 0845    docusate sodium (COLACE) capsule 100 mg, 100 mg, Oral, BID, Olayide D Olaniyan, CRNP, 100 mg at 07/29/24 0846    famotidine (PEPCID) tablet 40 mg, 40 mg, Oral, Daily, Olayide D Olaniyan, CRNP, 40 mg at 07/29/24 0846    montelukast (SINGULAIR) tablet 10 mg, 10 mg, Oral, QPM, Olayide D Olaniyan, CRNP, 10 mg at 07/28/24 1701    ondansetron (ZOFRAN) injection 4 mg, 4 mg, Intravenous, Q6H PRN, Olayide D Olaniyan, CRNP, 4 mg at 07/23/24 0507    polyethylene glycol (MIRALAX) packet 17 g, 17 g, Oral, Daily, Olayide D Olaniyan, CRNP, 17 g at 07/24/24 0858    potassium chloride oral solution 40 mEq, 40 mEq, Oral, Once, Jeanna Browne MD    risperiDONE (RisperDAL) tablet 0.25 mg, 0.25 mg, Oral, Daily, Olayide D Olaniyan, CRNP, 0.25 mg at 07/29/24 0846    senna (SENOKOT) tablet 8.6 mg, 1 tablet, Oral, Daily, Olayide D Olaniyan, CRNP, 8.6 mg at 07/29/24 0846    sertraline (ZOLOFT) tablet 25 mg, 25 mg, Oral, Daily, HOPE Coronado, 25 mg at 07/29/24 0846    sodium chloride tablet 1 g, 1 g, Oral, Daily, Joselyn Reyes Bahamonde, MD, 1 g at 07/29/24 0846    Laboratory Results:  Results from last 7 days   Lab Units 07/29/24  0440 07/28/24  0350 07/27/24  0354 07/26/24  0529 07/25/24  2246 07/25/24  0500 07/24/24  0543 07/23/24  0503 07/22/24  1212   WBC Thousand/uL 13.49* 13.99* 13.27* 12.16*  --  14.77* 12.87* 13.82* 13.84*   HEMOGLOBIN g/dL 9.1* 9.5* 9.6* 9.1* 10.0* 9.5* 9.2* 9.6* 11.0*   HEMATOCRIT % 28.7* 31.1*  30.4* 30.3* 32.4* 31.4* 31.0* 31.8* 36.0   PLATELETS Thousands/uL 379 354 419* 395*  --  417* 418* 460* 498*   POTASSIUM mmol/L 3.2* 4.9 3.6 2.9*  --  3.3* 3.9 3.2* 3.6   CHLORIDE mmol/L 101 102 103 104  --  104 103 100 98   CO2 mmol/L 22 21 24 26  --  25 26 24 27   BUN mg/dL 11 11 10 10  --  10 12 17 19   CREATININE mg/dL 0.64 0.74 0.65 0.66  --  0.62 0.67 0.78 0.89   CALCIUM mg/dL 8.8 9.2 9.4 9.8  --  10.1 10.5* 11.6* 13.5*   MAGNESIUM mg/dL  --   --   --   --   --   --   --  2.0 2.2       PLEASE NOTE:  This encounter was completed utilizing the [employer] Direct Speech Voice Recognition Software. Grammatical errors, random word insertions, pronoun errors and incomplete sentences are occasional consequences of the system due to software limitations, ambient noise and hardware issues.These may be missed by proof reading prior to affixing electronic signature. Any questions or concerns about the content, text or information contained within the body of this dictation should be directly addressed to the physician for clarification. Please do not hesitate to call me directly if you have any any questions or concerns.